# Patient Record
Sex: MALE | Race: WHITE | NOT HISPANIC OR LATINO | Employment: FULL TIME | RURAL
[De-identification: names, ages, dates, MRNs, and addresses within clinical notes are randomized per-mention and may not be internally consistent; named-entity substitution may affect disease eponyms.]

---

## 2019-06-11 ENCOUNTER — OFFICE VISIT (OUTPATIENT)
Dept: FAMILY MEDICINE CLINIC | Facility: CLINIC | Age: 39
End: 2019-06-11

## 2019-06-11 VITALS
SYSTOLIC BLOOD PRESSURE: 136 MMHG | HEART RATE: 90 BPM | TEMPERATURE: 97.1 F | WEIGHT: 225 LBS | BODY MASS INDEX: 34.1 KG/M2 | HEIGHT: 68 IN | OXYGEN SATURATION: 98 % | RESPIRATION RATE: 20 BRPM | DIASTOLIC BLOOD PRESSURE: 89 MMHG

## 2019-06-11 DIAGNOSIS — R11.2 NAUSEA AND VOMITING, INTRACTABILITY OF VOMITING NOT SPECIFIED, UNSPECIFIED VOMITING TYPE: ICD-10-CM

## 2019-06-11 DIAGNOSIS — K21.9 GASTROESOPHAGEAL REFLUX DISEASE, ESOPHAGITIS PRESENCE NOT SPECIFIED: ICD-10-CM

## 2019-06-11 DIAGNOSIS — K50.119 CROHN'S DISEASE OF COLON WITH COMPLICATION (HCC): Primary | ICD-10-CM

## 2019-06-11 PROCEDURE — 99214 OFFICE O/P EST MOD 30 MIN: CPT | Performed by: NURSE PRACTITIONER

## 2019-06-11 RX ORDER — TRAMADOL HYDROCHLORIDE 50 MG/1
50 TABLET ORAL AS NEEDED
COMMUNITY
End: 2020-12-11

## 2019-06-11 RX ORDER — ESOMEPRAZOLE MAGNESIUM 40 MG/1
40 CAPSULE, DELAYED RELEASE ORAL
COMMUNITY
End: 2019-06-11 | Stop reason: SDUPTHER

## 2019-06-11 RX ORDER — ESOMEPRAZOLE MAGNESIUM 40 MG/1
40 CAPSULE, DELAYED RELEASE ORAL
Qty: 30 CAPSULE | Refills: 5 | Status: SHIPPED | OUTPATIENT
Start: 2019-06-11 | End: 2020-02-10

## 2019-06-11 RX ORDER — ONDANSETRON 8 MG/1
8 TABLET, ORALLY DISINTEGRATING ORAL EVERY 8 HOURS PRN
Qty: 30 TABLET | Refills: 5 | Status: SHIPPED | OUTPATIENT
Start: 2019-06-11 | End: 2020-09-03

## 2019-06-11 RX ORDER — HYDROCODONE BITARTRATE AND ACETAMINOPHEN 10; 325 MG/1; MG/1
1 TABLET ORAL EVERY 6 HOURS PRN
COMMUNITY
End: 2020-09-03 | Stop reason: ALTCHOICE

## 2019-06-11 RX ORDER — GABAPENTIN 300 MG/1
300 CAPSULE ORAL 3 TIMES DAILY
COMMUNITY
End: 2023-01-27

## 2019-06-11 RX ORDER — CHOLECALCIFEROL (VITAMIN D3) 125 MCG
5 CAPSULE ORAL AS NEEDED
COMMUNITY

## 2019-06-11 RX ORDER — ONDANSETRON 8 MG/1
8 TABLET, ORALLY DISINTEGRATING ORAL EVERY 8 HOURS PRN
COMMUNITY
End: 2019-06-11 | Stop reason: SDUPTHER

## 2019-06-11 RX ORDER — TIZANIDINE 4 MG/1
4 TABLET ORAL 2 TIMES DAILY
COMMUNITY
End: 2022-04-05

## 2019-06-13 NOTE — PROGRESS NOTES
Subjective   Michel Harmon is a 38 y.o. male.     Here today needing referral to pain management.  He was being seen in [facility].  They closed and did not refer him to [place].  He has a hx of crohns and has chronic pain.  He is out of his humira and it does work when he is able to get it.      Abdominal Pain   This is a chronic problem. The current episode started more than 1 year ago. The onset quality is undetermined. The problem occurs constantly. The problem has been unchanged. The pain is located in the generalized abdominal region (due to crohns). The pain is at a severity of 3/10. The pain is mild. The quality of the pain is colicky, cramping, burning, aching, dull and a sensation of fullness. The abdominal pain does not radiate. Associated symptoms include anorexia, diarrhea and flatus. Pertinent negatives include no arthralgias, belching, constipation, dysuria, fever, frequency, headaches, hematochezia, hematuria, melena, myalgias, nausea, vomiting or weight loss. The pain is aggravated by eating. Relieved by: humeria. Treatments tried: see above. The treatment provided significant relief. Prior diagnostic workup includes barium enema, CT scan, GI consult and lower endoscopy. His past medical history is significant for Crohn's disease. There is no history of colon cancer, gallstones, GERD, irritable bowel syndrome, pancreatitis, PUD or ulcerative colitis.        The following portions of the patient's history were reviewed and updated as appropriate: allergies, current medications, past family history, past medical history, past social history, past surgical history and problem list.    Review of Systems   Constitutional: Negative.  Negative for fever and unexpected weight loss.   HENT: Negative.    Eyes: Negative.    Respiratory: Negative.    Cardiovascular: Negative.    Gastrointestinal: Positive for abdominal pain, anorexia, diarrhea and flatus. Negative for constipation,  hematochezia, melena, nausea and vomiting.   Endocrine: Negative.    Genitourinary: Negative.  Negative for dysuria, frequency and hematuria.   Musculoskeletal: Negative.  Negative for arthralgias and myalgias.   Skin: Negative.    Allergic/Immunologic: Negative.    Neurological: Negative.    Hematological: Negative.    Psychiatric/Behavioral: Negative.        Objective   Physical Exam   Constitutional: He is oriented to person, place, and time. He appears well-developed and well-nourished. No distress.   HENT:   Head: Normocephalic and atraumatic.   Right Ear: External ear normal.   Left Ear: External ear normal.   Nose: Nose normal.   Mouth/Throat: Oropharynx is clear and moist. No oropharyngeal exudate.   Eyes: Pupils are equal, round, and reactive to light.   Neck: Normal range of motion. Neck supple. No thyromegaly present.   Cardiovascular: Normal rate, regular rhythm and normal heart sounds. Exam reveals no friction rub.   No murmur heard.  Pulmonary/Chest: Effort normal and breath sounds normal. No respiratory distress. He has no wheezes. He has no rales.   Abdominal: Soft. Bowel sounds are normal. He exhibits no distension and no mass. There is tenderness. There is no rebound and no guarding.   Musculoskeletal: Normal range of motion.   Neurological: He is alert and oriented to person, place, and time.   Skin: Skin is warm and dry.   Psychiatric: He has a normal mood and affect. Thought content normal.   Nursing note and vitals reviewed.        Assessment/Plan   Michel was seen today for crohn's disease and discuss pain management.    Diagnoses and all orders for this visit:    Crohn's disease of colon with complication (CMS/[facility])  -     adalimumab (HUMIRA) 40 MG/0.8ML Prefilled Syringe Kit injection; Inject 0.8 mL under the skin into the appropriate area as directed 1 (One) Time Per Week.    Nausea and vomiting, intractability of vomiting not specified, unspecified vomiting type  -     ondansetron ODT  (ZOFRAN-ODT) 8 MG disintegrating tablet; Take 1 tablet by mouth Every 8 (Eight) Hours As Needed for Nausea or Vomiting.    Gastroesophageal reflux disease, esophagitis presence not specified  -     esomeprazole (nexIUM) 40 MG capsule; Take 1 capsule by mouth Every Morning Before Breakfast.

## 2019-08-16 ENCOUNTER — TELEPHONE (OUTPATIENT)
Dept: FAMILY MEDICINE CLINIC | Facility: CLINIC | Age: 39
End: 2019-08-16

## 2019-08-16 NOTE — TELEPHONE ENCOUNTER
[facility] records to [facility] finally got all records from [facility] [facility]

## 2019-08-21 ENCOUNTER — TELEPHONE (OUTPATIENT)
Dept: FAMILY MEDICINE CLINIC | Facility: CLINIC | Age: 39
End: 2019-08-21

## 2019-08-21 DIAGNOSIS — K50.019 CROHN'S DISEASE OF SMALL INTESTINE WITH COMPLICATION (HCC): Primary | ICD-10-CM

## 2019-08-21 PROBLEM — K85.90 PANCREATITIS: Status: ACTIVE | Noted: 2019-08-21

## 2019-08-21 PROBLEM — Z79.60 LONG-TERM USE OF IMMUNOSUPPRESSANT MEDICATION: Status: ACTIVE | Noted: 2018-03-20

## 2019-08-21 PROBLEM — G47.33 OBSTRUCTIVE SLEEP APNEA SYNDROME: Status: ACTIVE | Noted: 2019-08-21

## 2019-08-21 PROBLEM — K63.1: Status: ACTIVE | Noted: 2019-08-21

## 2019-08-21 PROBLEM — F17.200 TOBACCO DEPENDENCE: Status: ACTIVE | Noted: 2018-03-20

## 2019-08-21 PROBLEM — K44.9 HIATAL HERNIA: Status: ACTIVE | Noted: 2019-08-21

## 2020-02-09 DIAGNOSIS — K21.9 GASTROESOPHAGEAL REFLUX DISEASE, ESOPHAGITIS PRESENCE NOT SPECIFIED: ICD-10-CM

## 2020-02-10 RX ORDER — ESOMEPRAZOLE MAGNESIUM 40 MG/1
CAPSULE, DELAYED RELEASE ORAL
Qty: 30 CAPSULE | Refills: 4 | Status: SHIPPED | OUTPATIENT
Start: 2020-02-10 | End: 2020-07-22 | Stop reason: SDUPTHER

## 2020-07-22 DIAGNOSIS — K21.9 GASTROESOPHAGEAL REFLUX DISEASE, ESOPHAGITIS PRESENCE NOT SPECIFIED: ICD-10-CM

## 2020-07-22 RX ORDER — ESOMEPRAZOLE MAGNESIUM 40 MG/1
40 CAPSULE, DELAYED RELEASE ORAL DAILY
Qty: 30 CAPSULE | Refills: 0 | Status: SHIPPED | OUTPATIENT
Start: 2020-07-22 | End: 2020-08-31 | Stop reason: SDUPTHER

## 2020-08-06 DIAGNOSIS — K21.9 GASTROESOPHAGEAL REFLUX DISEASE, ESOPHAGITIS PRESENCE NOT SPECIFIED: ICD-10-CM

## 2020-08-06 RX ORDER — ESOMEPRAZOLE MAGNESIUM 40 MG/1
CAPSULE, DELAYED RELEASE ORAL
Qty: 30 CAPSULE | Refills: 0 | OUTPATIENT
Start: 2020-08-06

## 2020-08-25 DIAGNOSIS — K21.9 GASTROESOPHAGEAL REFLUX DISEASE, ESOPHAGITIS PRESENCE NOT SPECIFIED: ICD-10-CM

## 2020-08-25 RX ORDER — ESOMEPRAZOLE MAGNESIUM 40 MG/1
CAPSULE, DELAYED RELEASE ORAL
Qty: 30 CAPSULE | Refills: 0 | OUTPATIENT
Start: 2020-08-25

## 2020-08-27 DIAGNOSIS — K21.9 GASTROESOPHAGEAL REFLUX DISEASE, ESOPHAGITIS PRESENCE NOT SPECIFIED: ICD-10-CM

## 2020-08-27 RX ORDER — ESOMEPRAZOLE MAGNESIUM 40 MG/1
CAPSULE, DELAYED RELEASE ORAL
Qty: 30 CAPSULE | Refills: 0 | OUTPATIENT
Start: 2020-08-27

## 2020-08-31 DIAGNOSIS — K21.9 GASTROESOPHAGEAL REFLUX DISEASE, ESOPHAGITIS PRESENCE NOT SPECIFIED: ICD-10-CM

## 2020-08-31 RX ORDER — ESOMEPRAZOLE MAGNESIUM 40 MG/1
40 CAPSULE, DELAYED RELEASE ORAL DAILY
Qty: 30 CAPSULE | Refills: 0 | Status: SHIPPED | OUTPATIENT
Start: 2020-08-31 | End: 2020-09-17 | Stop reason: SDUPTHER

## 2020-08-31 NOTE — TELEPHONE ENCOUNTER
Patient needs refill         esomeprazole (nexIUM) 40 MG capsule  [id number] - [place], [place] - [address] AT [facility] [address] - [phone]  - [phone]    Been out for a week

## 2020-09-03 ENCOUNTER — OFFICE VISIT (OUTPATIENT)
Dept: FAMILY MEDICINE CLINIC | Facility: CLINIC | Age: 40
End: 2020-09-03

## 2020-09-03 VITALS
OXYGEN SATURATION: 97 % | BODY MASS INDEX: 33.25 KG/M2 | TEMPERATURE: 97.8 F | HEIGHT: 68 IN | DIASTOLIC BLOOD PRESSURE: 92 MMHG | SYSTOLIC BLOOD PRESSURE: 134 MMHG | WEIGHT: 219.38 LBS | HEART RATE: 79 BPM | RESPIRATION RATE: 20 BRPM

## 2020-09-03 DIAGNOSIS — K50.019 CROHN'S DISEASE OF SMALL INTESTINE WITH COMPLICATION (HCC): ICD-10-CM

## 2020-09-03 DIAGNOSIS — IMO0002 CHRONIC MIGRAINE: Primary | ICD-10-CM

## 2020-09-03 DIAGNOSIS — E66.9 OBESITY (BMI 30-39.9): ICD-10-CM

## 2020-09-03 PROCEDURE — 99214 OFFICE O/P EST MOD 30 MIN: CPT | Performed by: NURSE PRACTITIONER

## 2020-09-03 RX ORDER — RIZATRIPTAN BENZOATE 5 MG/1
5 TABLET ORAL ONCE AS NEEDED
Qty: 12 TABLET | Refills: 0 | Status: SHIPPED | OUTPATIENT
Start: 2020-09-03 | End: 2020-10-08 | Stop reason: SDUPTHER

## 2020-09-03 RX ORDER — OXYCODONE AND ACETAMINOPHEN 10; 325 MG/1; MG/1
1 TABLET ORAL 3 TIMES DAILY
COMMUNITY
Start: 2020-08-06

## 2020-09-03 RX ORDER — TAMSULOSIN HYDROCHLORIDE 0.4 MG/1
CAPSULE ORAL
COMMUNITY
Start: 2020-08-20 | End: 2020-09-03

## 2020-09-03 NOTE — PROGRESS NOTES
"Subjective   Michel Harmon is a 39 y.o. male.     [facility] Same Day Appointment    PCP: SAMMY Burnette--last seen June 2019    CC: \"med reill\"    Requesting Nexium refill for GERD, but PCP actually just sent in to pharmacy for him and he has picked that up.  Also requesting to be restarted on Maxalt for migraines.  Has had in the past with good results, but not taken in the past 3-4 years.  Reports he is taking BC powder daily for headaches.  Keeps a mild headache daily, but full migraine with n/v, photo/phonosensitivity at least 2 days per week.  Is in pain management for chronic nerve pain on right side following surgery for crohns.  Was previously seeing gastroenterology at [place], but reports he is unable to continue to make the drive for appointments and would like to see local Gastro--last seen early 2019.  Referral placed for Gastro.     Migraine    This is a chronic problem. The current episode started more than 1 year ago. Episode frequency: headaches daily, severe at least 2 days/week. The problem has been unchanged. Pain location: usually on right side. The pain does not radiate. The pain quality is similar to prior headaches (usually alwasy similar). The quality of the pain is described as throbbing. The patient is experiencing no pain (no headache curently). Associated symptoms include abdominal pain (chronic due to crohns), anorexia, nausea, phonophobia and photophobia. Pertinent negatives include no abnormal behavior, dizziness, drainage, ear pain, eye pain, eye redness, eye watering, facial sweating, fever, hearing loss, loss of balance, scalp tenderness, seizures, sinus pressure, tinnitus, visual change, vomiting, weakness or weight loss. The symptoms are aggravated by unknown. Treatments tried: currently on BC poweder daily with mild improvement; previously on Maxalt which helped. His past medical history is significant for migraine headaches. There is no history of recent head traumas. (Nerve damage " following surgery for crohns)   Abdominal Pain   This is a chronic problem. The current episode started more than 1 year ago. The onset quality is gradual. The problem occurs daily. The problem has been unchanged. The pain is located in the generalized abdominal region. The quality of the pain is dull, a sensation of fullness, cramping and colicky. Associated symptoms include anorexia, diarrhea, flatus, headaches and nausea. Pertinent negatives include no arthralgias, belching, constipation, dysuria, fever, frequency, hematochezia, hematuria, melena, myalgias, vomiting or weight loss. Nothing aggravates the pain. Relieved by: pain management sees for chronic pain; humira; nexium. The treatment provided moderate relief. Prior diagnostic workup includes GI consult (needs like GI consult). His past medical history is significant for Crohn's disease ( diagnosed around age 32). nerve damage following surgery for crohns        The following portions of the patient's history were reviewed and updated as appropriate: allergies, current medications, past medical history, past social history, past surgical history and problem list.    Review of Systems   Constitutional: Negative for chills, fatigue, fever and unexpected weight loss.   HENT: Negative for congestion, ear pain, hearing loss, sinus pressure and tinnitus.    Eyes: Positive for photophobia. Negative for pain and redness.   Respiratory: Negative.    Cardiovascular: Negative.    Gastrointestinal: Positive for abdominal pain (chronic due to crohns), anorexia, diarrhea, flatus and nausea. Negative for constipation, hematochezia, melena and vomiting.   Genitourinary: Negative for dysuria, frequency and hematuria.   Musculoskeletal: Negative for arthralgias and myalgias.   Skin: Negative.    Neurological: Positive for headache. Negative for dizziness, seizures, weakness and loss of balance.     /92 (BP Location: Right arm, Patient Position: Sitting)   Pulse 79    "Temp 97.8 °F (36.6 °C) (Temporal)   Resp 20   Ht 172.7 cm (68\")   Wt 99.5 kg (219 lb 6 oz)   SpO2 97%   BMI 33.36 kg/m²     Objective   Physical Exam   Constitutional: He is oriented to person, place, and time. He appears well-developed and well-nourished. No distress.   HENT:   Head: Normocephalic and atraumatic.   Right Ear: Tympanic membrane and ear canal normal.   Left Ear: Tympanic membrane and ear canal normal.   Nose: Nose normal. Right sinus exhibits no maxillary sinus tenderness and no frontal sinus tenderness. Left sinus exhibits no maxillary sinus tenderness and no frontal sinus tenderness.   Mouth/Throat: Uvula is midline, oropharynx is clear and moist and mucous membranes are normal.   Eyes: Conjunctivae and EOM are normal. Right eye exhibits no discharge. Left eye exhibits no discharge.   Neck: Normal range of motion. Neck supple.   Cardiovascular: Normal rate and regular rhythm.   Pulmonary/Chest: Effort normal and breath sounds normal. He has no wheezes. He has no rales.   Abdominal: Soft. Bowel sounds are normal. There is no tenderness. There is no rebound and no guarding.   Lymphadenopathy:     He has no cervical adenopathy.   Neurological: He is alert and oriented to person, place, and time.   Skin: Skin is warm and dry.   Nursing note and vitals reviewed.    No results found for this or any previous visit (from the past 24 hour(s)).  No Images in the past 120 days found..      Assessment/Plan   Michel was seen today for med refill.    Diagnoses and all orders for this visit:    Chronic migraine  -     rizatriptan (Maxalt) 5 MG tablet; Take 1 tablet by mouth 1 (One) Time As Needed for Migraine for up to 1 dose. May repeat in 2 hours if needed    Crohn's disease of small intestine with complication (CMS/HCC)  -     Ambulatory Referral to Gastroenterology    Obesity (BMI 30-39.9)      Rx for Maxalt for migraines  Keep headache log.  May need to start preventative migraine meds if Maxalt needed " more than once weekly    Referral to Gastro for ongoing management of Crohns disease    See PCP in 1 month for annual physical/labs/migraine recheck    Continue with pain management as scheduled    Patient's Body mass index is 33.36 kg/m². BMI is above normal parameters. Recommendations include: referral to primary care.     See PCP or RTC if symptoms persist/worsen  See PCP for routine f/u visit and management of chronic medical conditions      This document has been electronically signed by SAMMY Carney on September 3, 2020 09:03,.

## 2020-09-17 ENCOUNTER — LAB (OUTPATIENT)
Dept: LAB | Facility: HOSPITAL | Age: 40
End: 2020-09-17

## 2020-09-17 ENCOUNTER — OFFICE VISIT (OUTPATIENT)
Dept: GASTROENTEROLOGY | Facility: CLINIC | Age: 40
End: 2020-09-17

## 2020-09-17 VITALS
DIASTOLIC BLOOD PRESSURE: 103 MMHG | BODY MASS INDEX: 33.07 KG/M2 | SYSTOLIC BLOOD PRESSURE: 145 MMHG | WEIGHT: 218.2 LBS | HEART RATE: 98 BPM | HEIGHT: 68 IN

## 2020-09-17 DIAGNOSIS — K21.00 GASTROESOPHAGEAL REFLUX DISEASE WITH ESOPHAGITIS: ICD-10-CM

## 2020-09-17 DIAGNOSIS — R19.7 DIARRHEA, UNSPECIFIED TYPE: ICD-10-CM

## 2020-09-17 DIAGNOSIS — K21.9 GASTROESOPHAGEAL REFLUX DISEASE, ESOPHAGITIS PRESENCE NOT SPECIFIED: ICD-10-CM

## 2020-09-17 DIAGNOSIS — R11.2 NAUSEA AND VOMITING, INTRACTABILITY OF VOMITING NOT SPECIFIED, UNSPECIFIED VOMITING TYPE: Primary | ICD-10-CM

## 2020-09-17 DIAGNOSIS — K50.019 CROHN'S DISEASE OF SMALL INTESTINE WITH COMPLICATION (HCC): ICD-10-CM

## 2020-09-17 PROCEDURE — 99214 OFFICE O/P EST MOD 30 MIN: CPT | Performed by: PHYSICIAN ASSISTANT

## 2020-09-17 PROCEDURE — 36415 COLL VENOUS BLD VENIPUNCTURE: CPT | Performed by: PHYSICIAN ASSISTANT

## 2020-09-17 PROCEDURE — 85652 RBC SED RATE AUTOMATED: CPT | Performed by: PHYSICIAN ASSISTANT

## 2020-09-17 PROCEDURE — 80053 COMPREHEN METABOLIC PANEL: CPT | Performed by: PHYSICIAN ASSISTANT

## 2020-09-17 PROCEDURE — 86140 C-REACTIVE PROTEIN: CPT | Performed by: PHYSICIAN ASSISTANT

## 2020-09-17 PROCEDURE — 85025 COMPLETE CBC W/AUTO DIFF WBC: CPT | Performed by: PHYSICIAN ASSISTANT

## 2020-09-17 RX ORDER — HYOSCYAMINE SULFATE EXTENDED-RELEASE 0.38 MG/1
0.38 TABLET ORAL NIGHTLY
Qty: 60 TABLET | Refills: 3 | Status: SHIPPED | OUTPATIENT
Start: 2020-09-17 | End: 2021-08-24 | Stop reason: SDDI

## 2020-09-17 RX ORDER — DEXTROSE AND SODIUM CHLORIDE 5; .45 G/100ML; G/100ML
30 INJECTION, SOLUTION INTRAVENOUS CONTINUOUS PRN
Status: CANCELLED | OUTPATIENT
Start: 2020-10-07

## 2020-09-17 RX ORDER — ESOMEPRAZOLE MAGNESIUM 40 MG/1
40 CAPSULE, DELAYED RELEASE ORAL DAILY
Qty: 30 CAPSULE | Refills: 3 | Status: SHIPPED | OUTPATIENT
Start: 2020-09-17 | End: 2020-10-08 | Stop reason: SDUPTHER

## 2020-09-17 RX ORDER — SODIUM, POTASSIUM,MAG SULFATES 17.5-3.13G
SOLUTION, RECONSTITUTED, ORAL ORAL
Qty: 2 BOTTLE | Refills: 0 | Status: SHIPPED | OUTPATIENT
Start: 2020-09-17 | End: 2020-10-07 | Stop reason: HOSPADM

## 2020-09-18 LAB
ALBUMIN SERPL-MCNC: 4.6 G/DL (ref 3.5–5.2)
ALBUMIN/GLOB SERPL: 1.8 G/DL
ALP SERPL-CCNC: 89 U/L (ref 39–117)
ALT SERPL W P-5'-P-CCNC: 50 U/L (ref 1–41)
ANION GAP SERPL CALCULATED.3IONS-SCNC: 10.4 MMOL/L (ref 5–15)
AST SERPL-CCNC: 21 U/L (ref 1–40)
BASOPHILS # BLD AUTO: 0.1 10*3/MM3 (ref 0–0.2)
BASOPHILS NFR BLD AUTO: 0.9 % (ref 0–1.5)
BILIRUB SERPL-MCNC: 0.3 MG/DL (ref 0–1.2)
BUN SERPL-MCNC: 17 MG/DL (ref 6–20)
BUN/CREAT SERPL: 13.9 (ref 7–25)
CALCIUM SPEC-SCNC: 9.2 MG/DL (ref 8.6–10.5)
CHLORIDE SERPL-SCNC: 107 MMOL/L (ref 98–107)
CO2 SERPL-SCNC: 24.6 MMOL/L (ref 22–29)
CREAT SERPL-MCNC: 1.22 MG/DL (ref 0.76–1.27)
CRP SERPL-MCNC: 0.27 MG/DL (ref 0–0.5)
DEPRECATED RDW RBC AUTO: 43.4 FL (ref 37–54)
EOSINOPHIL # BLD AUTO: 0.45 10*3/MM3 (ref 0–0.4)
EOSINOPHIL NFR BLD AUTO: 4.2 % (ref 0.3–6.2)
ERYTHROCYTE [DISTWIDTH] IN BLOOD BY AUTOMATED COUNT: 13.2 % (ref 12.3–15.4)
ERYTHROCYTE [SEDIMENTATION RATE] IN BLOOD: 3 MM/HR (ref 0–15)
GFR SERPL CREATININE-BSD FRML MDRD: 65 ML/MIN/1.73
GLOBULIN UR ELPH-MCNC: 2.6 GM/DL
GLUCOSE SERPL-MCNC: 93 MG/DL (ref 65–99)
HCT VFR BLD AUTO: 45 % (ref 37.5–51)
HGB BLD-MCNC: 16 G/DL (ref 13–17.7)
IMM GRANULOCYTES # BLD AUTO: 0.04 10*3/MM3 (ref 0–0.05)
IMM GRANULOCYTES NFR BLD AUTO: 0.4 % (ref 0–0.5)
LYMPHOCYTES # BLD AUTO: 3.7 10*3/MM3 (ref 0.7–3.1)
LYMPHOCYTES NFR BLD AUTO: 34.8 % (ref 19.6–45.3)
MCH RBC QN AUTO: 32.1 PG (ref 26.6–33)
MCHC RBC AUTO-ENTMCNC: 35.6 G/DL (ref 31.5–35.7)
MCV RBC AUTO: 90.4 FL (ref 79–97)
MONOCYTES # BLD AUTO: 0.76 10*3/MM3 (ref 0.1–0.9)
MONOCYTES NFR BLD AUTO: 7.1 % (ref 5–12)
NEUTROPHILS NFR BLD AUTO: 5.59 10*3/MM3 (ref 1.7–7)
NEUTROPHILS NFR BLD AUTO: 52.6 % (ref 42.7–76)
NRBC BLD AUTO-RTO: 0 /100 WBC (ref 0–0.2)
PLATELET # BLD AUTO: 271 10*3/MM3 (ref 140–450)
PMV BLD AUTO: 10.8 FL (ref 6–12)
POTASSIUM SERPL-SCNC: 3.9 MMOL/L (ref 3.5–5.2)
PROT SERPL-MCNC: 7.2 G/DL (ref 6–8.5)
RBC # BLD AUTO: 4.98 10*6/MM3 (ref 4.14–5.8)
SODIUM SERPL-SCNC: 142 MMOL/L (ref 136–145)
WBC # BLD AUTO: 10.64 10*3/MM3 (ref 3.4–10.8)

## 2020-09-28 ENCOUNTER — LAB (OUTPATIENT)
Dept: LAB | Facility: HOSPITAL | Age: 40
End: 2020-09-28

## 2020-09-28 PROCEDURE — 83993 ASSAY FOR CALPROTECTIN FECAL: CPT | Performed by: PHYSICIAN ASSISTANT

## 2020-10-04 ENCOUNTER — LAB (OUTPATIENT)
Dept: LAB | Facility: HOSPITAL | Age: 40
End: 2020-10-04
Attending: INTERNAL MEDICINE

## 2020-10-04 DIAGNOSIS — Z01.818 PREOP TESTING: Primary | ICD-10-CM

## 2020-10-04 LAB — SARS-COV-2 N GENE RESP QL NAA+PROBE: NOT DETECTED

## 2020-10-04 PROCEDURE — C9803 HOPD COVID-19 SPEC COLLECT: HCPCS

## 2020-10-04 PROCEDURE — 87635 SARS-COV-2 COVID-19 AMP PRB: CPT

## 2020-10-07 ENCOUNTER — ANESTHESIA EVENT (OUTPATIENT)
Dept: GASTROENTEROLOGY | Facility: HOSPITAL | Age: 40
End: 2020-10-07

## 2020-10-07 ENCOUNTER — ANESTHESIA (OUTPATIENT)
Dept: GASTROENTEROLOGY | Facility: HOSPITAL | Age: 40
End: 2020-10-07

## 2020-10-07 ENCOUNTER — HOSPITAL ENCOUNTER (OUTPATIENT)
Facility: HOSPITAL | Age: 40
Setting detail: HOSPITAL OUTPATIENT SURGERY
Discharge: HOME | End: 2020-10-07
Attending: INTERNAL MEDICINE | Admitting: INTERNAL MEDICINE

## 2020-10-07 VITALS
RESPIRATION RATE: 16 BRPM | DIASTOLIC BLOOD PRESSURE: 69 MMHG | SYSTOLIC BLOOD PRESSURE: 120 MMHG | HEIGHT: 68 IN | HEART RATE: 76 BPM | OXYGEN SATURATION: 96 % | TEMPERATURE: 98.1 F | WEIGHT: 211.42 LBS | BODY MASS INDEX: 32.04 KG/M2

## 2020-10-07 DIAGNOSIS — K50.019 CROHN'S DISEASE OF SMALL INTESTINE WITH COMPLICATION (HCC): ICD-10-CM

## 2020-10-07 DIAGNOSIS — K21.00 GASTROESOPHAGEAL REFLUX DISEASE WITH ESOPHAGITIS: ICD-10-CM

## 2020-10-07 DIAGNOSIS — R19.7 DIARRHEA, UNSPECIFIED TYPE: ICD-10-CM

## 2020-10-07 DIAGNOSIS — R11.2 NAUSEA AND VOMITING, INTRACTABILITY OF VOMITING NOT SPECIFIED, UNSPECIFIED VOMITING TYPE: ICD-10-CM

## 2020-10-07 LAB — CALPROTECTIN STL-MCNT: 78 UG/G (ref 0–120)

## 2020-10-07 PROCEDURE — 25010000002 PROPOFOL 10 MG/ML EMULSION: Performed by: NURSE ANESTHETIST, CERTIFIED REGISTERED

## 2020-10-07 PROCEDURE — 45380 COLONOSCOPY AND BIOPSY: CPT | Performed by: INTERNAL MEDICINE

## 2020-10-07 PROCEDURE — 43239 EGD BIOPSY SINGLE/MULTIPLE: CPT | Performed by: INTERNAL MEDICINE

## 2020-10-07 RX ORDER — PROPOFOL 10 MG/ML
VIAL (ML) INTRAVENOUS AS NEEDED
Status: DISCONTINUED | OUTPATIENT
Start: 2020-10-07 | End: 2020-10-07 | Stop reason: SURG

## 2020-10-07 RX ORDER — DEXTROSE AND SODIUM CHLORIDE 5; .45 G/100ML; G/100ML
30 INJECTION, SOLUTION INTRAVENOUS CONTINUOUS PRN
Status: DISCONTINUED | OUTPATIENT
Start: 2020-10-07 | End: 2020-10-07 | Stop reason: HOSPADM

## 2020-10-07 RX ORDER — LIDOCAINE HYDROCHLORIDE 20 MG/ML
INJECTION, SOLUTION INTRAVENOUS AS NEEDED
Status: DISCONTINUED | OUTPATIENT
Start: 2020-10-07 | End: 2020-10-07 | Stop reason: SURG

## 2020-10-07 RX ADMIN — PROPOFOL 20 MG: 10 INJECTION, EMULSION INTRAVENOUS at 14:28

## 2020-10-07 RX ADMIN — PROPOFOL 40 MG: 10 INJECTION, EMULSION INTRAVENOUS at 14:25

## 2020-10-07 RX ADMIN — PROPOFOL 30 MG: 10 INJECTION, EMULSION INTRAVENOUS at 14:29

## 2020-10-07 RX ADMIN — PROPOFOL 30 MG: 10 INJECTION, EMULSION INTRAVENOUS at 14:27

## 2020-10-07 RX ADMIN — DEXTROSE AND SODIUM CHLORIDE 30 ML/HR: 5; 450 INJECTION, SOLUTION INTRAVENOUS at 14:15

## 2020-10-07 RX ADMIN — PROPOFOL 40 MG: 10 INJECTION, EMULSION INTRAVENOUS at 14:34

## 2020-10-07 RX ADMIN — LIDOCAINE HYDROCHLORIDE 100 MG: 20 INJECTION, SOLUTION INTRAVENOUS at 14:23

## 2020-10-07 RX ADMIN — PROPOFOL 30 MG: 10 INJECTION, EMULSION INTRAVENOUS at 14:30

## 2020-10-07 RX ADMIN — PROPOFOL 110 MG: 10 INJECTION, EMULSION INTRAVENOUS at 14:23

## 2020-10-07 NOTE — ANESTHESIA POSTPROCEDURE EVALUATION
Patient: Michel Harmon    Procedure Summary     Date: 10/07/20 Room / Location: [facility] ENDOSCOPY 1 / [facility] ENDOSCOPY    Anesthesia Start: 1419 Anesthesia Stop: 1437    Procedures:       ESOPHAGOGASTRODUODENOSCOPY--bx (N/A )      COLONOSCOPY--look TI (N/A ) Diagnosis:       Nausea and vomiting, intractability of vomiting not specified, unspecified vomiting type      Crohn's disease of small intestine with complication (CMS/HCC)      Gastroesophageal reflux disease with esophagitis      Diarrhea, unspecified type      (Nausea and vomiting, intractability of vomiting not specified, unspecified vomiting type [R11.2])      (Crohn's disease of small intestine with complication (CMS/HCC) [K50.019])      (Gastroesophageal reflux disease with esophagitis [K21.0])      (Diarrhea, unspecified type [R19.7])    Surgeon: Wisam Carpio MD Provider: Santana Oconnor CRNA    Anesthesia Type: MAC ASA Status: 2          Anesthesia Type: MAC    Vitals  No vitals data found for the desired time range.          Post Anesthesia Care and Evaluation    Patient location during evaluation: bedside  Patient participation: complete - patient participated  Level of consciousness: awake and alert  Pain score: 0  Pain management: adequate  Airway patency: patent  Anesthetic complications: No anesthetic complications  PONV Status: none  Cardiovascular status: acceptable  Respiratory status: acceptable and spontaneous ventilation  Hydration status: acceptable

## 2020-10-07 NOTE — ANESTHESIA PREPROCEDURE EVALUATION
Anesthesia Evaluation     NPO Solid Status: > 8 hours  NPO Liquid Status: > 2 hours           Airway   No difficulty expected  Dental      Pulmonary - normal exam   (+) sleep apnea,   Cardiovascular - normal exam        Neuro/Psych  (+) headaches,     GI/Hepatic/Renal/Endo    (+) obesity,  hiatal hernia, GERD,      Musculoskeletal     Abdominal    Substance History      OB/GYN          Other                        Anesthesia Plan    ASA 2     MAC     intravenous induction     Anesthetic plan, all risks, benefits, and alternatives have been provided, discussed and informed consent has been obtained with: patient.

## 2020-10-08 ENCOUNTER — OFFICE VISIT (OUTPATIENT)
Dept: FAMILY MEDICINE CLINIC | Facility: CLINIC | Age: 40
End: 2020-10-08

## 2020-10-08 VITALS
TEMPERATURE: 96.9 F | BODY MASS INDEX: 32.89 KG/M2 | OXYGEN SATURATION: 97 % | SYSTOLIC BLOOD PRESSURE: 112 MMHG | DIASTOLIC BLOOD PRESSURE: 80 MMHG | HEART RATE: 62 BPM | RESPIRATION RATE: 20 BRPM | WEIGHT: 217 LBS | HEIGHT: 68 IN

## 2020-10-08 DIAGNOSIS — IMO0002 CHRONIC MIGRAINE: ICD-10-CM

## 2020-10-08 DIAGNOSIS — K21.9 GASTROESOPHAGEAL REFLUX DISEASE: ICD-10-CM

## 2020-10-08 PROCEDURE — 99214 OFFICE O/P EST MOD 30 MIN: CPT | Performed by: NURSE PRACTITIONER

## 2020-10-08 RX ORDER — RIZATRIPTAN BENZOATE 5 MG/1
5 TABLET ORAL ONCE AS NEEDED
Qty: 18 TABLET | Refills: 0 | Status: SHIPPED | OUTPATIENT
Start: 2020-10-08 | End: 2020-12-10

## 2020-10-08 RX ORDER — ESOMEPRAZOLE MAGNESIUM 40 MG/1
40 CAPSULE, DELAYED RELEASE ORAL DAILY
Qty: 30 CAPSULE | Refills: 3 | Status: SHIPPED | OUTPATIENT
Start: 2020-10-08 | End: 2021-02-03 | Stop reason: SDUPTHER

## 2020-10-08 NOTE — PATIENT INSTRUCTIONS
Calorie Counting for Weight Loss  Calories are units of energy. Your body needs a certain amount of calories from food to keep you going throughout the day. When you eat more calories than your body needs, your body stores the extra calories as fat. When you eat fewer calories than your body needs, your body burns fat to get the energy it needs.  Calorie counting means keeping track of how many calories you eat and drink each day. Calorie counting can be helpful if you need to lose weight. If you make sure to eat fewer calories than your body needs, you should lose weight. Ask your health care provider what a healthy weight is for you.  For calorie counting to work, you will need to eat the right number of calories in a day in order to lose a healthy amount of weight per week. A dietitian can help you determine how many calories you need in a day and will give you suggestions on how to reach your calorie goal.  · A healthy amount of weight to lose per week is usually 1-2 lb (0.5-0.9 kg). This usually means that your daily calorie intake should be reduced by 500-750 calories.  · Eating 1,200 - 1,500 calories per day can help most women lose weight.  · Eating 1,500 - 1,800 calories per day can help most men lose weight.  What is my plan?  My goal is to have __________ calories per day.  If I have this many calories per day, I should lose around __________ pounds per week.  What do I need to know about calorie counting?  In order to meet your daily calorie goal, you will need to:  · Find out how many calories are in each food you would like to eat. Try to do this before you eat.  · Decide how much of the food you plan to eat.  · Write down what you ate and how many calories it had. Doing this is called keeping a food log.  To successfully lose weight, it is important to balance calorie counting with a healthy lifestyle that includes regular activity. Aim for 150 minutes of moderate exercise (such as walking) or 75  minutes of vigorous exercise (such as running) each week.  Where do I find calorie information?    The number of calories in a food can be found on a Nutrition Facts label. If a food does not have a Nutrition Facts label, try to look up the calories online or ask your dietitian for help.  Remember that calories are listed per serving. If you choose to have more than one serving of a food, you will have to multiply the calories per serving by the amount of servings you plan to eat. For example, the label on a package of bread might say that a serving size is 1 slice and that there are 90 calories in a serving. If you eat 1 slice, you will have eaten 90 calories. If you eat 2 slices, you will have eaten 180 calories.  How do I keep a food log?  Immediately after each meal, record the following information in your food log:  · What you ate. Don't forget to include toppings, sauces, and other extras on the food.  · How much you ate. This can be measured in cups, ounces, or number of items.  · How many calories each food and drink had.  · The total number of calories in the meal.  Keep your food log near you, such as in a small notebook in your pocket, or use a mobile guy or website. Some programs will calculate calories for you and show you how many calories you have left for the day to meet your goal.  What are some calorie counting tips?    · Use your calories on foods and drinks that will fill you up and not leave you hungry:  ? Some examples of foods that fill you up are nuts and nut butters, vegetables, lean proteins, and high-fiber foods like whole grains. High-fiber foods are foods with more than 5 g fiber per serving.  ? Drinks such as sodas, specialty coffee drinks, alcohol, and juices have a lot of calories, yet do not fill you up.  · Eat nutritious foods and avoid empty calories. Empty calories are calories you get from foods or beverages that do not have many vitamins or protein, such as candy, sweets, and  "soda. It is better to have a nutritious high-calorie food (such as an avocado) than a food with few nutrients (such as a bag of chips).  · Know how many calories are in the foods you eat most often. This will help you calculate calorie counts faster.  · Pay attention to calories in drinks. Low-calorie drinks include water and unsweetened drinks.  · Pay attention to nutrition labels for \"low fat\" or \"fat free\" foods. These foods sometimes have the same amount of calories or more calories than the full fat versions. They also often have added sugar, starch, or salt, to make up for flavor that was removed with the fat.  · Find a way of tracking calories that works for you. Get creative. Try different apps or programs if writing down calories does not work for you.  What are some portion control tips?  · Know how many calories are in a serving. This will help you know how many servings of a certain food you can have.  · Use a measuring cup to measure serving sizes. You could also try weighing out portions on a kitchen scale. With time, you will be able to estimate serving sizes for some foods.  · Take some time to put servings of different foods on your favorite plates, bowls, and cups so you know what a serving looks like.  · Try not to eat straight from a bag or box. Doing this can lead to overeating. Put the amount you would like to eat in a cup or on a plate to make sure you are eating the right portion.  · Use smaller plates, glasses, and bowls to prevent overeating.  · Try not to multitask (for example, watch TV or use your computer) while eating. If it is time to eat, sit down at a table and enjoy your food. This will help you to know when you are full. It will also help you to be aware of what you are eating and how much you are eating.  What are tips for following this plan?  Reading food labels  · Check the calorie count compared to the serving size. The serving size may be smaller than what you are used to " "eating.  · Check the source of the calories. Make sure the food you are eating is high in vitamins and protein and low in saturated and trans fats.  Shopping  · Read nutrition labels while you shop. This will help you make healthy decisions before you decide to purchase your food.  · Make a grocery list and stick to it.  Cooking  · Try to cook your favorite foods in a healthier way. For example, try baking instead of frying.  · Use low-fat dairy products.  Meal planning  · Use more fruits and vegetables. Half of your plate should be fruits and vegetables.  · Include lean proteins like poultry and fish.  How do I count calories when eating out?  · Ask for smaller portion sizes.  · Consider sharing an entree and sides instead of getting your own entree.  · If you get your own entree, eat only half. Ask for a box at the beginning of your meal and put the rest of your entree in it so you are not tempted to eat it.  · If calories are listed on the menu, choose the lower calorie options.  · Choose dishes that include vegetables, fruits, whole grains, low-fat dairy products, and lean protein.  · Choose items that are boiled, broiled, grilled, or steamed. Stay away from items that are buttered, battered, fried, or served with cream sauce. Items labeled \"crispy\" are usually fried, unless stated otherwise.  · Choose water, low-fat milk, unsweetened iced tea, or other drinks without added sugar. If you want an alcoholic beverage, choose a lower calorie option such as a glass of wine or light beer.  · Ask for dressings, sauces, and syrups on the side. These are usually high in calories, so you should limit the amount you eat.  · If you want a salad, choose a garden salad and ask for grilled meats. Avoid extra toppings like palomino, cheese, or fried items. Ask for the dressing on the side, or ask for olive oil and vinegar or lemon to use as dressing.  · Estimate how many servings of a food you are given. For example, a serving of " cooked rice is ½ cup or about the size of half a baseball. Knowing serving sizes will help you be aware of how much food you are eating at restaurants. The list below tells you how big or small some common portion sizes are based on everyday objects:  ? 1 oz--4 stacked dice.  ? 3 oz--1 deck of cards.  ? 1 tsp--1 die.  ? 1 Tbsp--½ a ping-pong ball.  ? 2 Tbsp--1 ping-pong ball.  ? ½ cup--½ baseball.  ? 1 cup--1 baseball.  Summary  · Calorie counting means keeping track of how many calories you eat and drink each day. If you eat fewer calories than your body needs, you should lose weight.  · A healthy amount of weight to lose per week is usually 1-2 lb (0.5-0.9 kg). This usually means reducing your daily calorie intake by 500-750 calories.  · The number of calories in a food can be found on a Nutrition Facts label. If a food does not have a Nutrition Facts label, try to look up the calories online or ask your dietitian for help.  · Use your calories on foods and drinks that will fill you up, and not on foods and drinks that will leave you hungry.  · Use smaller plates, glasses, and bowls to prevent overeating.  This information is not intended to replace advice given to you by your health care provider. Make sure you discuss any questions you have with your health care provider.  Document Released: 12/18/2006 Document Revised: 09/06/2019 Document Reviewed: 11/17/2017  [employer] Patient Education © 2020 [employer]      Exercising to Lose Weight  Exercise is structured, repetitive physical activity to improve fitness and health. Getting regular exercise is important for everyone. It is especially important if you are overweight. Being overweight increases your risk of heart disease, stroke, diabetes, high blood pressure, and several types of cancer. Reducing your calorie intake and exercising can help you lose weight.  Exercise is usually categorized as moderate or vigorous intensity. To lose weight, most people need  to do a certain amount of moderate-intensity or vigorous-intensity exercise each week.  Moderate-intensity exercise    Moderate-intensity exercise is any activity that gets you moving enough to burn at least three times more energy (calories) than if you were sitting.  Examples of moderate exercise include:  · Walking a mile in 15 minutes.  · Doing light yard work.  · Biking at an easy pace.  Most people should get at least 150 minutes (2 hours and 30 minutes) a week of moderate-intensity exercise to maintain their body weight.  Vigorous-intensity exercise  Vigorous-intensity exercise is any activity that gets you moving enough to burn at least six times more calories than if you were sitting. When you exercise at this intensity, you should be working hard enough that you are not able to carry on a conversation.  Examples of vigorous exercise include:  · Running.  · Playing a team sport, such as football, basketball, and soccer.  · Jumping rope.  Most people should get at least 75 minutes (1 hour and 15 minutes) a week of vigorous-intensity exercise to maintain their body weight.  How can exercise affect me?  When you exercise enough to burn more calories than you eat, you lose weight. Exercise also reduces body fat and builds muscle. The more muscle you have, the more calories you burn. Exercise also:  · Improves mood.  · Reduces stress and tension.  · Improves your overall fitness, flexibility, and endurance.  · Increases bone strength.  The amount of exercise you need to lose weight depends on:  · Your age.  · The type of exercise.  · Any health conditions you have.  · Your overall physical ability.  Talk to your health care provider about how much exercise you need and what types of activities are safe for you.  What actions can I take to lose weight?  Nutrition    · Make changes to your diet as told by your health care provider or diet and nutrition specialist (dietitian). This may include:  ? Eating fewer  calories.  ? Eating more protein.  ? Eating less unhealthy fats.  ? Eating a diet that includes fresh fruits and vegetables, whole grains, low-fat dairy products, and lean protein.  ? Avoiding foods with added fat, salt, and sugar.  · Drink plenty of water while you exercise to prevent dehydration or heat stroke.  Activity  · Choose an activity that you enjoy and set realistic goals. Your health care provider can help you make an exercise plan that works for you.  · Exercise at a moderate or vigorous intensity most days of the week.  ? The intensity of exercise may vary from person to person. You can tell how intense a workout is for you by paying attention to your breathing and heartbeat. Most people will notice their breathing and heartbeat get faster with more intense exercise.  · Do resistance training twice each week, such as:  ? Push-ups.  ? Sit-ups.  ? Lifting weights.  ? Using resistance bands.  · Getting short amounts of exercise can be just as helpful as long structured periods of exercise. If you have trouble finding time to exercise, try to include exercise in your daily routine.  ? Get up, stretch, and walk around every 30 minutes throughout the day.  ? Go for a walk during your lunch break.  ? Park your car farther away from your destination.  ? If you take public transportation, get off one stop early and walk the rest of the way.  ? Make phone calls while standing up and walking around.  ? Take the stairs instead of elevators or escalators.  · Wear comfortable clothes and shoes with good support.  · Do not exercise so much that you hurt yourself, feel dizzy, or get very short of breath.  Where to find more information  · [place] Department of Health and Human Services: www.hhs.gov  · Centers for Disease Control and Prevention (CDC): www.cdc.gov  Contact a health care provider:  · Before starting a new exercise program.  · If you have questions or concerns about your weight.  · If you have a medical  problem that keeps you from exercising.  Get help right away if you have any of the following while exercising:  · Injury.  · Dizziness.  · Difficulty breathing or shortness of breath that does not go away when you stop exercising.  · Chest pain.  · Rapid heartbeat.  Summary  · Being overweight increases your risk of heart disease, stroke, diabetes, high blood pressure, and several types of cancer.  · Losing weight happens when you burn more calories than you eat.  · Reducing the amount of calories you eat in addition to getting regular moderate or vigorous exercise each week helps you lose weight.  This information is not intended to replace advice given to you by your health care provider. Make sure you discuss any questions you have with your health care provider.  Document Released: 01/20/2012 Document Revised: 12/31/2018 Document Reviewed: 12/31/2018  [employer] Patient Education © 2020 [employer]

## 2020-10-08 NOTE — PROGRESS NOTES
Subjective   Michel Harmon is a 39 y.o. male.     Michel Harmon age 39 comes in today for refills he needs a refill on his Maxalt that he takes for his migraine headaches.  He states he has a migraine quality headache at least 3-4 times a week and he is not taking a preventative.  He does take oxycodone and gabapentin for chronic back pain and he also has a history of Crohn's disease.  He also has heartburn and takes Nexium.    Headache   This is a chronic problem. The problem occurs constantly. The problem has been unchanged. The pain is located in the bilateral and temporal region. The pain does not radiate. The pain quality is similar to prior headaches. The quality of the pain is described as aching, band-like and throbbing. The pain is at a severity of 10/10. The pain is severe. Associated symptoms include phonophobia, photophobia and rhinorrhea. Pertinent negatives include no abdominal pain, abnormal behavior, anorexia, back pain, blurred vision, coughing, dizziness, drainage, ear pain, eye pain, eye redness, eye watering, facial sweating, fever, hearing loss, insomnia, loss of balance, muscle aches, nausea, neck pain, numbness, scalp tenderness, seizures, sinus pressure, sore throat, swollen glands, tingling, tinnitus, visual change, vomiting, weakness or weight loss. The symptoms are aggravated by bright light, fatigue and weather changes. He has tried triptans for the symptoms. The treatment provided moderate relief. His past medical history is significant for migraine headaches.   Heartburn  He complains of belching and heartburn. He reports no abdominal pain, no chest pain, no choking, no coughing, no dysphagia, no early satiety, no globus sensation, no hoarse voice, no nausea, no sore throat, no stridor, no tooth decay, no water brash or no wheezing. This is a chronic problem. The current episode started more than 1 year ago. The problem occurs constantly. The problem has been unchanged. The  heartburn is located in the substernum. The heartburn is of moderate intensity. The heartburn does not wake him from sleep. The heartburn does not limit his activity. The heartburn doesn't change with position. The symptoms are aggravated by certain foods. Pertinent negatives include no anemia, fatigue, melena, muscle weakness, orthopnea or weight loss. There are no known risk factors. He has tried a PPI for the symptoms. The treatment provided significant relief.        The following portions of the patient's history were reviewed and updated as appropriate: allergies, current medications, past family history, past medical history, past social history, past surgical history and problem list.    Review of Systems   Constitutional: Negative.  Negative for fatigue, fever and unexpected weight loss.   HENT: Positive for rhinorrhea. Negative for ear pain, hearing loss, hoarse voice, sinus pressure, sore throat, swollen glands and tinnitus.    Eyes: Positive for photophobia. Negative for blurred vision, pain and redness.   Respiratory: Negative.  Negative for cough, choking and wheezing.    Cardiovascular: Negative.  Negative for chest pain.   Gastrointestinal: Negative for abdominal pain, anorexia, dysphagia, melena, nausea and vomiting.   Endocrine: Negative.    Genitourinary: Negative.    Musculoskeletal: Negative.  Negative for back pain, muscle weakness and neck pain.   Skin: Negative.    Allergic/Immunologic: Negative.    Neurological: Positive for headache. Negative for dizziness, tingling, seizures, weakness, numbness and loss of balance.   Hematological: Negative.    Psychiatric/Behavioral: Negative.  The patient does not have insomnia.        Objective   Physical Exam  Vitals signs and nursing note reviewed.   Constitutional:       General: He is not in acute distress.     Appearance: He is well-developed.   HENT:      Head: Normocephalic and atraumatic.      Right Ear: External ear normal.      Left Ear:  External ear normal.      Nose: Nose normal.      Mouth/Throat:      Pharynx: No oropharyngeal exudate.   Eyes:      Pupils: Pupils are equal, round, and reactive to light.   Neck:      Musculoskeletal: Normal range of motion and neck supple.      Thyroid: No thyromegaly.   Cardiovascular:      Rate and Rhythm: Normal rate and regular rhythm.      Heart sounds: Normal heart sounds. No murmur. No friction rub.   Pulmonary:      Effort: Pulmonary effort is normal. No respiratory distress.      Breath sounds: Normal breath sounds. No wheezing or rales.   Abdominal:      Palpations: Abdomen is soft.   Musculoskeletal: Normal range of motion.   Skin:     General: Skin is warm and dry.   Neurological:      Mental Status: He is alert and oriented to person, place, and time.   Psychiatric:         Thought Content: Thought content normal.           Assessment/Plan   Michel was seen today for annual exam.    Diagnoses and all orders for this visit:    Gastroesophageal reflux disease  -     esomeprazole (nexIUM) 40 MG capsule; Take 1 capsule by mouth Daily.    Chronic migraine  -     rizatriptan (Maxalt) 5 MG tablet; Take 1 tablet by mouth 1 (One) Time As Needed for Migraine for up to 1 dose. May repeat in 2 hours if needed      Did discuss with him the possibility of using a medication like Emgality for control of the migraine headaches to hopefully cut down the number of headaches he has a month.  He will think about it and get back with me.  He also sees his gastroenterologist next week.    My is noted to be 31 which is considered out of range.  Diet and exercise information will be provided this patient.

## 2020-10-12 LAB
LAB AP CASE REPORT: NORMAL
PATH REPORT.FINAL DX SPEC: NORMAL

## 2020-10-20 ENCOUNTER — OFFICE VISIT (OUTPATIENT)
Dept: GASTROENTEROLOGY | Facility: CLINIC | Age: 40
End: 2020-10-20

## 2020-10-20 VITALS
WEIGHT: 221.2 LBS | DIASTOLIC BLOOD PRESSURE: 93 MMHG | HEIGHT: 68 IN | SYSTOLIC BLOOD PRESSURE: 133 MMHG | HEART RATE: 86 BPM | BODY MASS INDEX: 33.52 KG/M2

## 2020-10-20 DIAGNOSIS — K50.019 CROHN'S DISEASE OF SMALL INTESTINE WITH COMPLICATION (HCC): Primary | ICD-10-CM

## 2020-10-20 DIAGNOSIS — K20.0 ESOPHAGITIS, EOSINOPHILIC: ICD-10-CM

## 2020-10-20 DIAGNOSIS — R19.7 DIARRHEA, UNSPECIFIED TYPE: ICD-10-CM

## 2020-10-20 DIAGNOSIS — R10.33 PERIUMBILICAL ABDOMINAL PAIN: ICD-10-CM

## 2020-10-20 PROCEDURE — 99214 OFFICE O/P EST MOD 30 MIN: CPT | Performed by: PHYSICIAN ASSISTANT

## 2020-10-20 RX ORDER — MESALAMINE 0.38 G/1
1125 CAPSULE, EXTENDED RELEASE ORAL DAILY
Qty: 120 CAPSULE | Refills: 3 | Status: SHIPPED | OUTPATIENT
Start: 2020-10-20 | End: 2020-11-11

## 2020-10-20 NOTE — PATIENT INSTRUCTIONS
"BMI for Adults  What is BMI?  Body mass index (BMI) is a number that is calculated from a person's weight and height. BMI can help estimate how much of a person's weight is composed of fat. BMI does not measure body fat directly. Rather, it is an alternative to procedures that directly measure body fat, which can be difficult and expensive.  BMI can help identify people who may be at higher risk for certain medical problems.  What are BMI measurements used for?  BMI is used as a screening tool to identify possible weight problems. It helps determine whether a person is obese, overweight, a healthy weight, or underweight.  BMI is useful for:  · Identifying a weight problem that may be related to a medical condition or may increase the risk for medical problems.  · Promoting changes, such as changes in diet and exercise, to help reach a healthy weight. BMI screening can be repeated to see if these changes are working.  How is BMI calculated?  BMI involves measuring your weight in relation to your height. Both height and weight are measured, and the BMI is calculated from those numbers. This can be done either in English ([place] or metric measurements. Note that charts and online BMI calculators are available to help you find your BMI quickly and easily without having to do these calculations yourself.  To calculate your BMI in English ([place] measurements:    1. Measure your weight in pounds (lb).  2. Multiply the number of pounds by 703.  ? For example, for a person who weighs 180 lb, multiply that number by 703, which equals 126,540.  3. Measure your height in inches. Then multiply that number by itself to get a measurement called \"inches squared.\"  ? For example, for a person who is 70 inches tall, the \"inches squared\" measurement is 70 inches x 70 inches, which equals 4,900 inches squared.  4. Divide the total from step 2 (number of lb x 703) by the total from step 3 (inches squared): 126,540 ÷ 4,900 = 25.8. This is " "your BMI.  To calculate your BMI in metric measurements:  1. Measure your weight in kilograms (kg).  2. Measure your height in meters (m). Then multiply that number by itself to get a measurement called \"meters squared.\"  ? For example, for a person who is 1.75 m tall, the \"meters squared\" measurement is 1.75 m x 1.75 m, which is equal to 3.1 meters squared.  3. Divide the number of kilograms (your weight) by the meters squared number. In this example: 70 ÷ 3.1 = 22.6. This is your BMI.  What do the results mean?  BMI charts are used to identify whether you are underweight, normal weight, overweight, or obese. The following guidelines will be used:  · Underweight: BMI less than 18.5.  · Normal weight: BMI between 18.5 and 24.9.  · Overweight: BMI between 25 and 29.9.  · Obese: BMI of 30 or above.  Keep these notes in mind:  · Weight includes both fat and muscle, so someone with a muscular build, such as an athlete, may have a BMI that is higher than 24.9. In cases like these, BMI is not an accurate measure of body fat.  · To determine if excess body fat is the cause of a BMI of 25 or higher, further assessments may need to be done by a health care provider.  · BMI is usually interpreted in the same way for men and women.  Where to find more information  For more information about BMI, including tools to quickly calculate your BMI, go to these websites:  · Centers for Disease Control and Prevention: www.cdc.gov  · American Heart Association: www.heart.org  · National Heart, Lung, and Blood [place]: www.nhlbi.nih.gov  Summary  · Body mass index (BMI) is a number that is calculated from a person's weight and height.  · BMI may help estimate how much of a person's weight is composed of fat. BMI can help identify those who may be at higher risk for certain medical problems.  · BMI can be measured using English measurements or metric measurements.  · BMI charts are used to identify whether you are underweight, normal " weight, overweight, or obese.  This information is not intended to replace advice given to you by your health care provider. Make sure you discuss any questions you have with your health care provider.  Document Released: 08/29/2005 Document Revised: 09/09/2020 Document Reviewed: 07/17/2020  Elsevier Patient Education © 2020 [employer]

## 2020-10-20 NOTE — PROGRESS NOTES
Chief Complaint   Patient presents with   • Nausea   • Vomiting   • Crohn's Disease   • Heartburn       ENDO PROCEDURE ORDERED:    Subjective    Michel Harmon is a 39 y.o. male. he is here today for follow-up.    History of Present Illness    Patient seen on a recheck of his nausea, vomiting, small bowel Crohn's disease, GERD. Last seen 09/17/2020. I had started him on Levbid at night. He states he is having more formed stools, they are less frequent. He is a little concerned he might get constipated on them. He has been vomiting some bile. He complains of 3 out of 10 abdominal pain. He is on Nexium 40 mg daily for chronic heartburn. No blood in his stool. Weight is up 3 pounds since last visit.     Laboratories 09/28/2020, Faecal calprotectin was borderline at 78. Normal sedimentation rate, CRP, CBC. CMP showed an ALT of 50, otherwise normal.     Patient underwent EGD/colonoscopy on 10/07/2020, showed esophagitis, diffuse gastritis. Distal esophageal biopsy showed increased eosinophils. Antral biopsy showed mild chronic gastritis, duodenal biopsy was negative. Colonoscopy showed prior end-to-side anastomosis in the ascending colon, prep was adequate. Biopsy from the terminal ileum showed chronic active ileitis. He did appear to have some ulcerations in this area. Random biopsies were negative. Recommend repeat colonoscopy in no greater than 5 years.       A/P: Patient with significant esophagitis, gastritis with increased eosinophils of questionable significance. He denied dysphagia. Will continue on the Nexium. He was encouraged to avoid gastric irritants. Encouraged dietary modification and weight loss. I discussed with him some other treatments for the bile he is refluxing. This may interfere with his other medications and I was concerned it may cause him to have more complications. Will continue on the Levbid for now. Will start him on Apriso 4 daily. His other results are reviewed with him. Will likely  need to plan further evaluation of his elevated liver enzymes if this remains persistently elevated. Will plan follow-up in 1 month, further pending clinical course and the results of the above.       The following portions of the patient's history were reviewed and updated as appropriate:   Past Medical History:   Diagnosis Date   • Crohn's disease (CMS/HCC)    • GERD (gastroesophageal reflux disease)    • Sleep apnea      Past Surgical History:   Procedure Laterality Date   • COLON SURGERY  2015   • COLONOSCOPY     • COLONOSCOPY N/A 10/7/2020    Procedure: COLONOSCOPY--look TI;  Surgeon: Wisam Carpio MD;  Location: [facility] ENDOSCOPY;  Service: Gastroenterology;  Laterality: N/A;   • ENDOSCOPY N/A 10/7/2020    Procedure: ESOPHAGOGASTRODUODENOSCOPY--bx;  Surgeon: Wisam Carpio MD;  Location: [facility] ENDOSCOPY;  Service: Gastroenterology;  Laterality: N/A;     Family History   Problem Relation Age of Onset   • Breast cancer Other    • Brain cancer Other    • Ulcers Other    • Bleeding Disorder Other        No Known Allergies  Social History     Socioeconomic History   • Marital status:      Spouse name: Not on file   • Number of children: Not on file   • Years of education: Not on file   • Highest education level: Not on file   Tobacco Use   • Smoking status: Current Every Day Smoker     Packs/day: 1.00     Types: Cigarettes   • Smokeless tobacco: Never Used   Substance and Sexual Activity   • Alcohol use: Not Currently   • Drug use: Not Currently   • Sexual activity: Defer     Current Medications:  Prior to Admission medications    Medication Sig Start Date End Date Taking? Authorizing Provider   Aspirin-Salicylamide-Caffeine (BC HEADACHE POWDER PO) Take  by mouth 2 (Two) Times a Day As Needed.   Yes Provider, MD Sharon   esomeprazole (nexIUM) 40 MG capsule Take 1 capsule by mouth Daily. 10/8/20  Yes Gogo Can APRN   gabapentin (NEURONTIN) 600 MG tablet Take 600 mg by mouth 4 (Four) Times a  "Day.   Yes Sharon Verduzco MD   hyoscyamine (Levbid) 0.375 MG 12 hr tablet Take 1 tablet by mouth Every Night. 9/17/20  Yes Dru Corrales PA-C   melatonin 5 MG tablet tablet Take 5 mg by mouth Every Night.   Yes Sharon Verduzco MD   oxyCODONE-acetaminophen (PERCOCET)  MG per tablet Take 1 tablet by mouth 3 (Three) Times a Day. 8/6/20  Yes Sharon Verduzco MD   rizatriptan (Maxalt) 5 MG tablet Take 1 tablet by mouth 1 (One) Time As Needed for Migraine for up to 1 dose. May repeat in 2 hours if needed 10/8/20  Yes Gogo Can APRN   tiZANidine (ZANAFLEX) 4 MG tablet Take 4 mg by mouth 2 (Two) Times a Day.   Yes Sharon Verduzco MD   traMADol (ULTRAM) 50 MG tablet Take 50 mg by mouth As Needed.   Yes Sharon Verduzco MD     Review of Systems  Review of Systems       Objective    /93   Pulse 86   Ht 172.7 cm (68\")   Wt 100 kg (221 lb 3.2 oz)   BMI 33.63 kg/m²   Physical Exam  Vitals signs and nursing note reviewed.   Constitutional:       General: He is not in acute distress.     Appearance: He is well-developed.   HENT:      Head: Normocephalic and atraumatic.   Eyes:      Pupils: Pupils are equal, round, and reactive to light.   Neck:      Musculoskeletal: Normal range of motion.   Cardiovascular:      Rate and Rhythm: Normal rate and regular rhythm.      Heart sounds: Normal heart sounds.   Pulmonary:      Effort: Pulmonary effort is normal.      Breath sounds: Normal breath sounds.   Abdominal:      General: Bowel sounds are normal. There is no distension or abdominal bruit.      Palpations: Abdomen is soft. Abdomen is not rigid. There is no shifting dullness or mass.      Tenderness: There is abdominal tenderness. There is no guarding or rebound.      Hernia: No hernia is present. There is no hernia in the ventral area.      Comments: mild   Musculoskeletal: Normal range of motion.   Skin:     General: Skin is warm and dry.   Neurological:      Mental Status: He " is alert and oriented to person, place, and time.   Psychiatric:         Behavior: Behavior normal.         Thought Content: Thought content normal.         Judgment: Judgment normal.       Assessment/Plan      1. Crohn's disease of small intestine with complication (CMS/HCC)    2. Diarrhea, unspecified type    3. Periumbilical abdominal pain    4. Esophagitis, eosinophilic    .   Diagnoses and all orders for this visit:    1. Crohn's disease of small intestine with complication (CMS/HCC) (Primary)    2. Diarrhea, unspecified type    3. Periumbilical abdominal pain    4. Esophagitis, eosinophilic    Other orders  -     mesalamine (Apriso) 0.375 g 24 hr capsule; Take 3 capsules by mouth Daily.  Dispense: 120 capsule; Refill: 3        Orders placed during this encounter include:  No orders of the defined types were placed in this encounter.      Medications prescribed:  New Medications Ordered This Visit   Medications   • mesalamine (Apriso) 0.375 g 24 hr capsule     Sig: Take 3 capsules by mouth Daily.     Dispense:  120 capsule     Refill:  3       Requested Prescriptions     Signed Prescriptions Disp Refills   • mesalamine (Apriso) 0.375 g 24 hr capsule 120 capsule 3     Sig: Take 3 capsules by mouth Daily.       Review and/or summary of lab tests, radiology, procedures, medications. Review and summary of old records and obtaining of history. The risks and benefits of my recommendations, as well as other treatment options were discussed with the patient today. Questions were answered.    Follow-up: Return in about 6 weeks (around 12/1/2020), or if symptoms worsen or fail to improve.     * Surgery not found *      This document has been electronically signed by Dru Corrales PA-C on October 21, 2020 19:01 CDT      Results for orders placed or performed during the hospital encounter of 10/07/20   Tissue Pathology Exam    Specimen: A: Small Intestine, Duodenum; Tissue    B: Gastric, Antrum; Tissue    C: Esophagus,  Distal; Tissue    D: Small Intestine, Ileum; Tissue    E: Large Intestine; Tissue   Result Value Ref Range    Case Report       Surgical Pathology Report                         Case: [id number]                                  Authorizing Provider:  Wisam Carpio MD        Collected:           10/07/2020 02:28 PM          Ordering Location:     [facility]             Received:            10/08/2020 07:19 AM                                 [facility]                                                     Pathologist:           Shorty Anderson MD                                                     Specimens:   1) - Small Intestine, Duodenum                                                                      2) - Gastric, Antrum                                                                                3) - Esophagus, Distal                                                                              4) - Small Intestine, Ileum, terminal ileum                                                         5) - Large Intestine, colonic mucosa                                                       Final Diagnosis       SEE SCANNED REPORT       Results for orders placed or performed in visit on 10/04/20   COVID-19, [facility] IN-HOUSE, NP SWAB IN TRANSPORT MEDIA 8-10 HR TAT - Swab, Nasopharynx    Specimen: Nasopharynx; Swab   Result Value Ref Range    COVID19 Not Detected Not Detected - Ref. Range   Results for orders placed or performed in visit on 09/17/20   Calprotectin, Fecal - Stool, Per Rectum    Specimen: Per Rectum; Stool   Result Value Ref Range    Calprotectin, Fecal 78 0 - 120 ug/g   CBC Auto Differential    Specimen: Blood   Result Value Ref Range    WBC 10.64 3.40 - 10.80 10*3/mm3    RBC 4.98 4.14 - 5.80 10*6/mm3    Hemoglobin 16.0 13.0 - 17.7 g/dL    Hematocrit 45.0 37.5 - 51.0 %    MCV 90.4 79.0 - 97.0 fL    MCH 32.1 26.6 - 33.0 pg    MCHC 35.6 31.5 - 35.7 g/dL    RDW 13.2 12.3 - 15.4 %     RDW-SD 43.4 37.0 - 54.0 fl    MPV 10.8 6.0 - 12.0 fL    Platelets 271 140 - 450 10*3/mm3    Neutrophil % 52.6 42.7 - 76.0 %    Lymphocyte % 34.8 19.6 - 45.3 %    Monocyte % 7.1 5.0 - 12.0 %    Eosinophil % 4.2 0.3 - 6.2 %    Basophil % 0.9 0.0 - 1.5 %    Immature Grans % 0.4 0.0 - 0.5 %    Neutrophils, Absolute 5.59 1.70 - 7.00 10*3/mm3    Lymphocytes, Absolute 3.70 (H) 0.70 - 3.10 10*3/mm3    Monocytes, Absolute 0.76 0.10 - 0.90 10*3/mm3    Eosinophils, Absolute 0.45 (H) 0.00 - 0.40 10*3/mm3    Basophils, Absolute 0.10 0.00 - 0.20 10*3/mm3    Immature Grans, Absolute 0.04 0.00 - 0.05 10*3/mm3    nRBC 0.0 0.0 - 0.2 /100 WBC   Sedimentation Rate    Specimen: Blood   Result Value Ref Range    Sed Rate 3 0 - 15 mm/hr   C-reactive Protein    Specimen: Blood   Result Value Ref Range    C-Reactive Protein 0.27 0.00 - 0.50 mg/dL   Comprehensive Metabolic Panel    Specimen: Blood   Result Value Ref Range    Glucose 93 65 - 99 mg/dL    BUN 17 6 - 20 mg/dL    Creatinine 1.22 0.76 - 1.27 mg/dL    Sodium 142 136 - 145 mmol/L    Potassium 3.9 3.5 - 5.2 mmol/L    Chloride 107 98 - 107 mmol/L    CO2 24.6 22.0 - 29.0 mmol/L    Calcium 9.2 8.6 - 10.5 mg/dL    Total Protein 7.2 6.0 - 8.5 g/dL    Albumin 4.60 3.50 - 5.20 g/dL    ALT (SGPT) 50 (H) 1 - 41 U/L    AST (SGOT) 21 1 - 40 U/L    Alkaline Phosphatase 89 39 - 117 U/L    Total Bilirubin 0.3 0.0 - 1.2 mg/dL    eGFR Non African Amer 65 >60 mL/min/1.73    Globulin 2.6 gm/dL    A/G Ratio 1.8 g/dL    BUN/Creatinine Ratio 13.9 7.0 - 25.0    Anion Gap 10.4 5.0 - 15.0 mmol/L       Some portions of this note have been dictated using voice recognition software and may contain errors and/or omissions.

## 2020-11-11 RX ORDER — MESALAMINE 800 MG/1
800 TABLET, DELAYED RELEASE ORAL 3 TIMES DAILY
Qty: 90 TABLET | Refills: 3 | Status: SHIPPED | OUTPATIENT
Start: 2020-11-11 | End: 2020-11-18

## 2020-11-18 RX ORDER — MESALAMINE 1.2 G/1
1200 TABLET, DELAYED RELEASE ORAL 4 TIMES DAILY
Qty: 120 TABLET | Refills: 3 | Status: SHIPPED | OUTPATIENT
Start: 2020-11-18 | End: 2021-04-20 | Stop reason: SDUPTHER

## 2020-12-01 ENCOUNTER — LAB (OUTPATIENT)
Dept: LAB | Facility: HOSPITAL | Age: 40
End: 2020-12-01

## 2020-12-01 ENCOUNTER — OFFICE VISIT (OUTPATIENT)
Dept: GASTROENTEROLOGY | Facility: CLINIC | Age: 40
End: 2020-12-01

## 2020-12-01 VITALS
BODY MASS INDEX: 33.49 KG/M2 | SYSTOLIC BLOOD PRESSURE: 137 MMHG | DIASTOLIC BLOOD PRESSURE: 88 MMHG | WEIGHT: 221 LBS | HEART RATE: 74 BPM | HEIGHT: 68 IN

## 2020-12-01 DIAGNOSIS — K50.019 CROHN'S DISEASE OF SMALL INTESTINE WITH COMPLICATION (HCC): Primary | ICD-10-CM

## 2020-12-01 DIAGNOSIS — R19.7 DIARRHEA, UNSPECIFIED TYPE: ICD-10-CM

## 2020-12-01 DIAGNOSIS — R11.2 NAUSEA AND VOMITING, INTRACTABILITY OF VOMITING NOT SPECIFIED, UNSPECIFIED VOMITING TYPE: ICD-10-CM

## 2020-12-01 DIAGNOSIS — R10.33 PERIUMBILICAL ABDOMINAL PAIN: ICD-10-CM

## 2020-12-01 LAB
ALBUMIN SERPL-MCNC: 4.6 G/DL (ref 3.5–5.2)
ALBUMIN/GLOB SERPL: 1.6 G/DL
ALP SERPL-CCNC: 94 U/L (ref 39–117)
ALT SERPL W P-5'-P-CCNC: 33 U/L (ref 1–41)
AMYLASE SERPL-CCNC: 87 U/L (ref 28–100)
ANION GAP SERPL CALCULATED.3IONS-SCNC: 12.4 MMOL/L (ref 5–15)
AST SERPL-CCNC: 21 U/L (ref 1–40)
BASOPHILS # BLD AUTO: 0.08 10*3/MM3 (ref 0–0.2)
BASOPHILS NFR BLD AUTO: 0.7 % (ref 0–1.5)
BILIRUB SERPL-MCNC: 0.3 MG/DL (ref 0–1.2)
BUN SERPL-MCNC: 15 MG/DL (ref 6–20)
BUN/CREAT SERPL: 12 (ref 7–25)
CALCIUM SPEC-SCNC: 9.3 MG/DL (ref 8.6–10.5)
CHLORIDE SERPL-SCNC: 108 MMOL/L (ref 98–107)
CO2 SERPL-SCNC: 23.6 MMOL/L (ref 22–29)
CREAT SERPL-MCNC: 1.25 MG/DL (ref 0.76–1.27)
CRP SERPL-MCNC: 0.44 MG/DL (ref 0–0.5)
DEPRECATED RDW RBC AUTO: 45.3 FL (ref 37–54)
EOSINOPHIL # BLD AUTO: 0.14 10*3/MM3 (ref 0–0.4)
EOSINOPHIL NFR BLD AUTO: 1.2 % (ref 0.3–6.2)
ERYTHROCYTE [DISTWIDTH] IN BLOOD BY AUTOMATED COUNT: 13.2 % (ref 12.3–15.4)
ERYTHROCYTE [SEDIMENTATION RATE] IN BLOOD: 4 MM/HR (ref 0–15)
GFR SERPL CREATININE-BSD FRML MDRD: 64 ML/MIN/1.73
GLOBULIN UR ELPH-MCNC: 2.8 GM/DL
GLUCOSE SERPL-MCNC: 119 MG/DL (ref 65–99)
HCT VFR BLD AUTO: 47.9 % (ref 37.5–51)
HGB BLD-MCNC: 16.7 G/DL (ref 13–17.7)
IMM GRANULOCYTES # BLD AUTO: 0.04 10*3/MM3 (ref 0–0.05)
IMM GRANULOCYTES NFR BLD AUTO: 0.3 % (ref 0–0.5)
LIPASE SERPL-CCNC: 48 U/L (ref 13–60)
LYMPHOCYTES # BLD AUTO: 1.73 10*3/MM3 (ref 0.7–3.1)
LYMPHOCYTES NFR BLD AUTO: 15 % (ref 19.6–45.3)
MCH RBC QN AUTO: 32.5 PG (ref 26.6–33)
MCHC RBC AUTO-ENTMCNC: 34.9 G/DL (ref 31.5–35.7)
MCV RBC AUTO: 93.2 FL (ref 79–97)
MONOCYTES # BLD AUTO: 0.48 10*3/MM3 (ref 0.1–0.9)
MONOCYTES NFR BLD AUTO: 4.2 % (ref 5–12)
NEUTROPHILS NFR BLD AUTO: 78.6 % (ref 42.7–76)
NEUTROPHILS NFR BLD AUTO: 9.07 10*3/MM3 (ref 1.7–7)
NRBC BLD AUTO-RTO: 0 /100 WBC (ref 0–0.2)
PLATELET # BLD AUTO: 266 10*3/MM3 (ref 140–450)
PMV BLD AUTO: 10.3 FL (ref 6–12)
POTASSIUM SERPL-SCNC: 4.6 MMOL/L (ref 3.5–5.2)
PROT SERPL-MCNC: 7.4 G/DL (ref 6–8.5)
RBC # BLD AUTO: 5.14 10*6/MM3 (ref 4.14–5.8)
SODIUM SERPL-SCNC: 144 MMOL/L (ref 136–145)
WBC # BLD AUTO: 11.54 10*3/MM3 (ref 3.4–10.8)

## 2020-12-01 PROCEDURE — 83690 ASSAY OF LIPASE: CPT | Performed by: PHYSICIAN ASSISTANT

## 2020-12-01 PROCEDURE — 80053 COMPREHEN METABOLIC PANEL: CPT | Performed by: PHYSICIAN ASSISTANT

## 2020-12-01 PROCEDURE — 86140 C-REACTIVE PROTEIN: CPT | Performed by: PHYSICIAN ASSISTANT

## 2020-12-01 PROCEDURE — 85651 RBC SED RATE NONAUTOMATED: CPT | Performed by: PHYSICIAN ASSISTANT

## 2020-12-01 PROCEDURE — 82150 ASSAY OF AMYLASE: CPT | Performed by: PHYSICIAN ASSISTANT

## 2020-12-01 PROCEDURE — 99214 OFFICE O/P EST MOD 30 MIN: CPT | Performed by: PHYSICIAN ASSISTANT

## 2020-12-01 PROCEDURE — 85025 COMPLETE CBC W/AUTO DIFF WBC: CPT | Performed by: PHYSICIAN ASSISTANT

## 2020-12-01 RX ORDER — ONDANSETRON HYDROCHLORIDE 8 MG/1
8 TABLET, FILM COATED ORAL EVERY 8 HOURS PRN
Qty: 30 TABLET | Refills: 1 | Status: SHIPPED | OUTPATIENT
Start: 2020-12-01 | End: 2022-06-23 | Stop reason: SDUPTHER

## 2020-12-01 RX ORDER — ONDANSETRON 8 MG/1
TABLET, ORALLY DISINTEGRATING ORAL
COMMUNITY
Start: 2020-11-02 | End: 2020-12-29 | Stop reason: SDUPTHER

## 2020-12-01 RX ORDER — METRONIDAZOLE 500 MG/1
500 TABLET ORAL 2 TIMES DAILY
Qty: 28 TABLET | Refills: 0 | Status: SHIPPED | OUTPATIENT
Start: 2020-12-01 | End: 2020-12-29

## 2020-12-01 NOTE — PROGRESS NOTES
Chief Complaint   Patient presents with   • Crohn's Disease   • Diarrhea   • esophagitis       ENDO PROCEDURE ORDERED:    Subjective    Michel Harmon is a 40 y.o. male. he is here today for follow-up.    History of Present Illness    The patient was seen on recheck of his Crohn's disease, diarrhea, GERD.  Last seen 10/20/2020.  Insurance required patient to take Lialda and he just started the medication this past Friday.  He started to have nausea and vomiting this morning.  He has had a headache and cold sweats.  Pain is 2/10 in the mid abdomen.  He has had increased diarrhea.  He does not think he has had blood in the stool, but he states he is color blind, but it is not certain.  He has been taking Levbid.  He is on Nexium 40 mg daily for chronic heartburn.  Weight is stable.  Last EGD/colonoscopy 10/07/2020 showed esophagitis, gastritis, prior anastomosis with active ileitis.    ASSESSMENT/PLAN:  Patient with nausea, vomiting, abdominal pain, increased diarrhea, with active ileitis.  We will continue on the Lialda.  We will give him a trial on Flagyl 500 mg b.i.d.  We will also add Zofran for his nausea and vomiting.  Would consider a trial of steroids, but we will try to avoid, if patient agrees and is reluctant to take steroids.  We will check a CBC, CMP, amylase, lipase, sedimentation rate, CRP.  Follow up in 1 month, sooner if needed.  Further pending clinical course and the results of the above.      The following portions of the patient's history were reviewed and updated as appropriate:   Past Medical History:   Diagnosis Date   • Crohn's disease (CMS/HCC)    • GERD (gastroesophageal reflux disease)    • Sleep apnea      Past Surgical History:   Procedure Laterality Date   • COLON SURGERY  2015   • COLONOSCOPY     • COLONOSCOPY N/A 10/7/2020    Procedure: COLONOSCOPY--look TI;  Surgeon: Wisam Carpio MD;  Location: [facility] ENDOSCOPY;  Service: Gastroenterology;  Laterality: N/A;   • ENDOSCOPY N/A  10/7/2020    Procedure: ESOPHAGOGASTRODUODENOSCOPY--bx;  Surgeon: Wisam Carpio MD;  Location: [facility] ENDOSCOPY;  Service: Gastroenterology;  Laterality: N/A;     Family History   Problem Relation Age of Onset   • Breast cancer Other    • Brain cancer Other    • Ulcers Other    • Bleeding Disorder Other        No Known Allergies  Social History     Socioeconomic History   • Marital status:      Spouse name: Not on file   • Number of children: Not on file   • Years of education: Not on file   • Highest education level: Not on file   Tobacco Use   • Smoking status: Current Every Day Smoker     Packs/day: 1.00     Types: Cigarettes   • Smokeless tobacco: Never Used   Substance and Sexual Activity   • Alcohol use: Not Currently   • Drug use: Not Currently   • Sexual activity: Defer     Current Medications:  Prior to Admission medications    Medication Sig Start Date End Date Taking? Authorizing Provider   Aspirin-Salicylamide-Caffeine (BC HEADACHE POWDER PO) Take  by mouth 2 (Two) Times a Day As Needed.   Yes ProviderSharon MD   esomeprazole (nexIUM) 40 MG capsule Take 1 capsule by mouth Daily. 10/8/20  Yes Gogo Can APRN   gabapentin (NEURONTIN) 600 MG tablet Take 600 mg by mouth 4 (Four) Times a Day.   Yes ProviderSharon MD   hyoscyamine (Levbid) 0.375 MG 12 hr tablet Take 1 tablet by mouth Every Night. 9/17/20  Yes Dru Corrales PA-C   melatonin 5 MG tablet tablet Take 5 mg by mouth Every Night.   Yes Sharon Verduzco MD   mesalamine (Lialda) 1.2 g EC tablet Take 1 tablet by mouth 4 (Four) Times a Day. 11/18/20  Yes Dru Corrales PA-C   oxyCODONE-acetaminophen (PERCOCET)  MG per tablet Take 1 tablet by mouth 3 (Three) Times a Day. 8/6/20  Yes Sharon Verduzco MD   rizatriptan (Maxalt) 5 MG tablet Take 1 tablet by mouth 1 (One) Time As Needed for Migraine for up to 1 dose. May repeat in 2 hours if needed 10/8/20  Yes Gogo Can APRN   tiZANidine  "(ZANAFLEX) 4 MG tablet Take 4 mg by mouth 2 (Two) Times a Day.   Yes Provider, MD Sharon   ondansetron ODT (ZOFRAN-ODT) 8 MG disintegrating tablet  11/2/20   Provider, MD Sharon   traMADol (ULTRAM) 50 MG tablet Take 50 mg by mouth As Needed.    Provider, MD Sharon     Review of Systems  Review of Systems       Objective    /88   Pulse 74   Ht 172.7 cm (68\")   Wt 100 kg (221 lb)   BMI 33.60 kg/m²   Physical Exam  Vitals signs and nursing note reviewed.   Constitutional:       General: He is not in acute distress.     Appearance: He is well-developed. He is ill-appearing.   HENT:      Head: Normocephalic and atraumatic.   Eyes:      Pupils: Pupils are equal, round, and reactive to light.   Neck:      Musculoskeletal: Normal range of motion.   Cardiovascular:      Rate and Rhythm: Normal rate and regular rhythm.      Heart sounds: Normal heart sounds.   Pulmonary:      Effort: Pulmonary effort is normal.      Breath sounds: Normal breath sounds.   Abdominal:      General: Bowel sounds are normal. There is no distension or abdominal bruit.      Palpations: Abdomen is soft. Abdomen is not rigid. There is no shifting dullness or mass.      Tenderness: There is abdominal tenderness. There is no guarding or rebound.      Hernia: No hernia is present. There is no hernia in the ventral area.      Comments: Mild diffuse   Musculoskeletal: Normal range of motion.   Skin:     General: Skin is warm and dry.   Neurological:      Mental Status: He is alert and oriented to person, place, and time.   Psychiatric:         Behavior: Behavior normal.         Thought Content: Thought content normal.         Judgment: Judgment normal.       Assessment/Plan      1. Crohn's disease of small intestine with complication (CMS/HCC)    2. Diarrhea, unspecified type    3. Nausea and vomiting, intractability of vomiting not specified, unspecified vomiting type    4. Periumbilical abdominal pain    .   Diagnoses and all " orders for this visit:    1. Crohn's disease of small intestine with complication (CMS/HCC) (Primary)  -     CBC & Differential  -     Comprehensive Metabolic Panel  -     Amylase  -     Lipase  -     C-reactive Protein  -     Sedimentation Rate  -     CBC Auto Differential    2. Diarrhea, unspecified type  -     CBC & Differential  -     Comprehensive Metabolic Panel  -     Amylase  -     Lipase  -     C-reactive Protein  -     Sedimentation Rate  -     CBC Auto Differential    3. Nausea and vomiting, intractability of vomiting not specified, unspecified vomiting type  -     CBC & Differential  -     Comprehensive Metabolic Panel  -     Amylase  -     Lipase  -     C-reactive Protein  -     Sedimentation Rate  -     CBC Auto Differential    4. Periumbilical abdominal pain  -     CBC & Differential  -     Comprehensive Metabolic Panel  -     Amylase  -     Lipase  -     C-reactive Protein  -     Sedimentation Rate  -     CBC Auto Differential    Other orders  -     metroNIDAZOLE (FLAGYL) 500 MG tablet; Take 1 tablet by mouth 2 (Two) Times a Day.  Dispense: 28 tablet; Refill: 0  -     ondansetron (Zofran) 8 MG tablet; Take 1 tablet by mouth Every 8 (Eight) Hours As Needed for Nausea or Vomiting.  Dispense: 30 tablet; Refill: 1        Orders placed during this encounter include:  Orders Placed This Encounter   Procedures   • Comprehensive Metabolic Panel   • Amylase   • Lipase   • C-reactive Protein   • Sedimentation Rate   • CBC Auto Differential   • CBC & Differential     Order Specific Question:   Manual Differential     Answer:   No       Medications prescribed:  New Medications Ordered This Visit   Medications   • metroNIDAZOLE (FLAGYL) 500 MG tablet     Sig: Take 1 tablet by mouth 2 (Two) Times a Day.     Dispense:  28 tablet     Refill:  0   • ondansetron (Zofran) 8 MG tablet     Sig: Take 1 tablet by mouth Every 8 (Eight) Hours As Needed for Nausea or Vomiting.     Dispense:  30 tablet     Refill:  1        Requested Prescriptions     Signed Prescriptions Disp Refills   • metroNIDAZOLE (FLAGYL) 500 MG tablet 28 tablet 0     Sig: Take 1 tablet by mouth 2 (Two) Times a Day.   • ondansetron (Zofran) 8 MG tablet 30 tablet 1     Sig: Take 1 tablet by mouth Every 8 (Eight) Hours As Needed for Nausea or Vomiting.       Review and/or summary of lab tests, radiology, procedures, medications. Review and summary of old records and obtaining of history. The risks and benefits of my recommendations, as well as other treatment options were discussed with the patient today. Questions were answered.    Follow-up: Return in about 1 month (around 1/1/2021), or if symptoms worsen or fail to improve.     * Surgery not found *      This document has been electronically signed by Dru Corrales PA-C on December 11, 2020 16:56 CST      Results for orders placed or performed in visit on 12/01/20   CBC Auto Differential    Specimen: Blood   Result Value Ref Range    WBC 11.54 (H) 3.40 - 10.80 10*3/mm3    RBC 5.14 4.14 - 5.80 10*6/mm3    Hemoglobin 16.7 13.0 - 17.7 g/dL    Hematocrit 47.9 37.5 - 51.0 %    MCV 93.2 79.0 - 97.0 fL    MCH 32.5 26.6 - 33.0 pg    MCHC 34.9 31.5 - 35.7 g/dL    RDW 13.2 12.3 - 15.4 %    RDW-SD 45.3 37.0 - 54.0 fl    MPV 10.3 6.0 - 12.0 fL    Platelets 266 140 - 450 10*3/mm3    Neutrophil % 78.6 (H) 42.7 - 76.0 %    Lymphocyte % 15.0 (L) 19.6 - 45.3 %    Monocyte % 4.2 (L) 5.0 - 12.0 %    Eosinophil % 1.2 0.3 - 6.2 %    Basophil % 0.7 0.0 - 1.5 %    Immature Grans % 0.3 0.0 - 0.5 %    Neutrophils, Absolute 9.07 (H) 1.70 - 7.00 10*3/mm3    Lymphocytes, Absolute 1.73 0.70 - 3.10 10*3/mm3    Monocytes, Absolute 0.48 0.10 - 0.90 10*3/mm3    Eosinophils, Absolute 0.14 0.00 - 0.40 10*3/mm3    Basophils, Absolute 0.08 0.00 - 0.20 10*3/mm3    Immature Grans, Absolute 0.04 0.00 - 0.05 10*3/mm3    nRBC 0.0 0.0 - 0.2 /100 WBC   Sedimentation Rate    Specimen: Blood   Result Value Ref Range    Sed Rate 4 0 - 15 mm/hr    C-reactive Protein    Specimen: Blood   Result Value Ref Range    C-Reactive Protein 0.44 0.00 - 0.50 mg/dL   Lipase    Specimen: Blood   Result Value Ref Range    Lipase 48 13 - 60 U/L   Amylase    Specimen: Blood   Result Value Ref Range    Amylase 87 28 - 100 U/L   Comprehensive Metabolic Panel    Specimen: Blood   Result Value Ref Range    Glucose 119 (H) 65 - 99 mg/dL    BUN 15 6 - 20 mg/dL    Creatinine 1.25 0.76 - 1.27 mg/dL    Sodium 144 136 - 145 mmol/L    Potassium 4.6 3.5 - 5.2 mmol/L    Chloride 108 (H) 98 - 107 mmol/L    CO2 23.6 22.0 - 29.0 mmol/L    Calcium 9.3 8.6 - 10.5 mg/dL    Total Protein 7.4 6.0 - 8.5 g/dL    Albumin 4.60 3.50 - 5.20 g/dL    ALT (SGPT) 33 1 - 41 U/L    AST (SGOT) 21 1 - 40 U/L    Alkaline Phosphatase 94 39 - 117 U/L    Total Bilirubin 0.3 0.0 - 1.2 mg/dL    eGFR Non African Amer 64 >60 mL/min/1.73    Globulin 2.8 gm/dL    A/G Ratio 1.6 g/dL    BUN/Creatinine Ratio 12.0 7.0 - 25.0    Anion Gap 12.4 5.0 - 15.0 mmol/L   Results for orders placed or performed during the hospital encounter of 10/07/20   Tissue Pathology Exam    Specimen: A: Small Intestine, Duodenum; Tissue    B: Gastric, Antrum; Tissue    C: Esophagus, Distal; Tissue    D: Small Intestine, Ileum; Tissue    E: Large Intestine; Tissue   Result Value Ref Range    Case Report       Surgical Pathology Report                         Case: [id number]                                  Authorizing Provider:  Wisam Carpio MD        Collected:           10/07/2020 02:28 PM          Ordering Location:     [facility]             Received:            10/08/2020 07:19 AM                                 [facility]                                                     Pathologist:           Shorty Anderson MD                                                     Specimens:   1) - Small Intestine, Duodenum                                                                      2) - Gastric, Antrum                                                                                 3) - Esophagus, Distal                                                                              4) - Small Intestine, Ileum, terminal ileum                                                         5) - Large Intestine, colonic mucosa                                                       Final Diagnosis       SEE SCANNED REPORT       Results for orders placed or performed in visit on 10/04/20   COVID-19, [facility] IN-HOUSE, NP SWAB IN TRANSPORT MEDIA 8-10 HR TAT - Swab, Nasopharynx    Specimen: Nasopharynx; Swab   Result Value Ref Range    COVID19 Not Detected Not Detected - Ref. Range   Results for orders placed or performed in visit on 09/17/20   Calprotectin, Fecal - Stool, Per Rectum    Specimen: Per Rectum; Stool   Result Value Ref Range    Calprotectin, Fecal 78 0 - 120 ug/g   CBC Auto Differential    Specimen: Blood   Result Value Ref Range    WBC 10.64 3.40 - 10.80 10*3/mm3    RBC 4.98 4.14 - 5.80 10*6/mm3    Hemoglobin 16.0 13.0 - 17.7 g/dL    Hematocrit 45.0 37.5 - 51.0 %    MCV 90.4 79.0 - 97.0 fL    MCH 32.1 26.6 - 33.0 pg    MCHC 35.6 31.5 - 35.7 g/dL    RDW 13.2 12.3 - 15.4 %    RDW-SD 43.4 37.0 - 54.0 fl    MPV 10.8 6.0 - 12.0 fL    Platelets 271 140 - 450 10*3/mm3    Neutrophil % 52.6 42.7 - 76.0 %    Lymphocyte % 34.8 19.6 - 45.3 %    Monocyte % 7.1 5.0 - 12.0 %    Eosinophil % 4.2 0.3 - 6.2 %    Basophil % 0.9 0.0 - 1.5 %    Immature Grans % 0.4 0.0 - 0.5 %    Neutrophils, Absolute 5.59 1.70 - 7.00 10*3/mm3    Lymphocytes, Absolute 3.70 (H) 0.70 - 3.10 10*3/mm3    Monocytes, Absolute 0.76 0.10 - 0.90 10*3/mm3    Eosinophils, Absolute 0.45 (H) 0.00 - 0.40 10*3/mm3    Basophils, Absolute 0.10 0.00 - 0.20 10*3/mm3    Immature Grans, Absolute 0.04 0.00 - 0.05 10*3/mm3    nRBC 0.0 0.0 - 0.2 /100 WBC   Sedimentation Rate    Specimen: Blood   Result Value Ref Range    Sed Rate 3 0 - 15 mm/hr   C-reactive Protein    Specimen: Blood   Result  Value Ref Range    C-Reactive Protein 0.27 0.00 - 0.50 mg/dL   Comprehensive Metabolic Panel    Specimen: Blood   Result Value Ref Range    Glucose 93 65 - 99 mg/dL    BUN 17 6 - 20 mg/dL    Creatinine 1.22 0.76 - 1.27 mg/dL    Sodium 142 136 - 145 mmol/L    Potassium 3.9 3.5 - 5.2 mmol/L    Chloride 107 98 - 107 mmol/L    CO2 24.6 22.0 - 29.0 mmol/L    Calcium 9.2 8.6 - 10.5 mg/dL    Total Protein 7.2 6.0 - 8.5 g/dL    Albumin 4.60 3.50 - 5.20 g/dL    ALT (SGPT) 50 (H) 1 - 41 U/L    AST (SGOT) 21 1 - 40 U/L    Alkaline Phosphatase 89 39 - 117 U/L    Total Bilirubin 0.3 0.0 - 1.2 mg/dL    eGFR Non African Amer 65 >60 mL/min/1.73    Globulin 2.6 gm/dL    A/G Ratio 1.8 g/dL    BUN/Creatinine Ratio 13.9 7.0 - 25.0    Anion Gap 10.4 5.0 - 15.0 mmol/L       Some portions of this note have been dictated using voice recognition software and may contain errors and/or omissions.

## 2020-12-01 NOTE — PATIENT INSTRUCTIONS
"BMI for Adults  What is BMI?  Body mass index (BMI) is a number that is calculated from a person's weight and height. BMI can help estimate how much of a person's weight is composed of fat. BMI does not measure body fat directly. Rather, it is an alternative to procedures that directly measure body fat, which can be difficult and expensive.  BMI can help identify people who may be at higher risk for certain medical problems.  What are BMI measurements used for?  BMI is used as a screening tool to identify possible weight problems. It helps determine whether a person is obese, overweight, a healthy weight, or underweight.  BMI is useful for:  · Identifying a weight problem that may be related to a medical condition or may increase the risk for medical problems.  · Promoting changes, such as changes in diet and exercise, to help reach a healthy weight. BMI screening can be repeated to see if these changes are working.  How is BMI calculated?  BMI involves measuring your weight in relation to your height. Both height and weight are measured, and the BMI is calculated from those numbers. This can be done either in English (U.S[place] or metric measurements. Note that charts and online BMI calculators are available to help you find your BMI quickly and easily without having to do these calculations yourself.  To calculate your BMI in English (U.S.) measurements:    1. Measure your weight in pounds (lb).  2. Multiply the number of pounds by 703.  ? For example, for a person who weighs 180 lb, multiply that number by 703, which equals 126,540.  3. Measure your height in inches. Then multiply that number by itself to get a measurement called \"inches squared.\"  ? For example, for a person who is 70 inches tall, the \"inches squared\" measurement is 70 inches x 70 inches, which equals 4,900 inches squared.  4. Divide the total from step 2 (number of lb x 703) by the total from step 3 (inches squared): 126,540 ÷ 4,900 = 25.8. This is " "your BMI.  To calculate your BMI in metric measurements:  1. Measure your weight in kilograms (kg).  2. Measure your height in meters (m). Then multiply that number by itself to get a measurement called \"meters squared.\"  ? For example, for a person who is 1.75 m tall, the \"meters squared\" measurement is 1.75 m x 1.75 m, which is equal to 3.1 meters squared.  3. Divide the number of kilograms (your weight) by the meters squared number. In this example: 70 ÷ 3.1 = 22.6. This is your BMI.  What do the results mean?  BMI charts are used to identify whether you are underweight, normal weight, overweight, or obese. The following guidelines will be used:  · Underweight: BMI less than 18.5.  · Normal weight: BMI between 18.5 and 24.9.  · Overweight: BMI between 25 and 29.9.  · Obese: BMI of 30 or above.  Keep these notes in mind:  · Weight includes both fat and muscle, so someone with a muscular build, such as an athlete, may have a BMI that is higher than 24.9. In cases like these, BMI is not an accurate measure of body fat.  · To determine if excess body fat is the cause of a BMI of 25 or higher, further assessments may need to be done by a health care provider.  · BMI is usually interpreted in the same way for men and women.  Where to find more information  For more information about BMI, including tools to quickly calculate your BMI, go to these websites:  · Centers for Disease Control and Prevention: www.cdc.gov  · American Heart Association: www.heart.org  · National Heart, Lung, and Blood Carbon: www.nhlbi.nih.gov  Summary  · Body mass index (BMI) is a number that is calculated from a person's weight and height.  · BMI may help estimate how much of a person's weight is composed of fat. BMI can help identify those who may be at higher risk for certain medical problems.  · BMI can be measured using English measurements or metric measurements.  · BMI charts are used to identify whether you are underweight, normal " weight, overweight, or obese.  This information is not intended to replace advice given to you by your health care provider. Make sure you discuss any questions you have with your health care provider.  Document Revised: 09/09/2020 Document Reviewed: 07/17/2020  Elsevier Patient Education © 2020 [employer]

## 2020-12-03 ENCOUNTER — TELEPHONE (OUTPATIENT)
Dept: GASTROENTEROLOGY | Facility: CLINIC | Age: 40
End: 2020-12-03

## 2020-12-03 NOTE — TELEPHONE ENCOUNTER
Patient wife called stating that the patient has severe abd pain, nausea and vomiting. Patients wife has been advised to have the patient come to the ED here at [facility] for an additional workup to include a CT scan. Understanding has been voiced.

## 2020-12-10 DIAGNOSIS — IMO0002 CHRONIC MIGRAINE: ICD-10-CM

## 2020-12-10 RX ORDER — RIZATRIPTAN BENZOATE 5 MG/1
TABLET ORAL
Qty: 6 TABLET | Refills: 5 | Status: SHIPPED | OUTPATIENT
Start: 2020-12-10 | End: 2022-06-23 | Stop reason: ALTCHOICE

## 2020-12-23 NOTE — PROGRESS NOTES
Patient had called the office complaining of severe abdominal pain on 12/03/2020. We did receive the records where he did go to the emergency room on that date. He did have a CT scan abdomen and pelvis with contrast at [facility]. This showed partially distended bladder, no inflammatory changes, postoperative changes in the right lower quadrant consistent with prior appendectomy, mild diffuse fatty liver, probable cyst less than 1 cm in the dome of the liver, small hiatal hernia, supraumbilical ventral hernia containing normal-appearing fat. The mouth of the hernia was 3 cm in transverse dimension. CBC was fairly normal with a white count of 9.7. A urinalysis was negative. It did show some mucus. It states that a CMP was obtained but it was not in the notes that we received. It also indicates he had a magnesium and a lipase drawn. He was discharged home.

## 2020-12-29 ENCOUNTER — OFFICE VISIT (OUTPATIENT)
Dept: GASTROENTEROLOGY | Facility: CLINIC | Age: 40
End: 2020-12-29

## 2020-12-29 VITALS
WEIGHT: 214 LBS | HEIGHT: 68 IN | DIASTOLIC BLOOD PRESSURE: 83 MMHG | BODY MASS INDEX: 32.43 KG/M2 | HEART RATE: 84 BPM | SYSTOLIC BLOOD PRESSURE: 127 MMHG

## 2020-12-29 DIAGNOSIS — K50.019 CROHN'S DISEASE OF SMALL INTESTINE WITH COMPLICATION (HCC): Primary | ICD-10-CM

## 2020-12-29 DIAGNOSIS — R19.7 DIARRHEA, UNSPECIFIED TYPE: ICD-10-CM

## 2020-12-29 DIAGNOSIS — R10.33 PERIUMBILICAL ABDOMINAL PAIN: ICD-10-CM

## 2020-12-29 DIAGNOSIS — K21.00 GASTROESOPHAGEAL REFLUX DISEASE WITH ESOPHAGITIS WITHOUT HEMORRHAGE: ICD-10-CM

## 2020-12-29 PROCEDURE — 99214 OFFICE O/P EST MOD 30 MIN: CPT | Performed by: PHYSICIAN ASSISTANT

## 2021-02-03 DIAGNOSIS — K21.9 GASTROESOPHAGEAL REFLUX DISEASE: ICD-10-CM

## 2021-02-04 RX ORDER — ESOMEPRAZOLE MAGNESIUM 40 MG/1
40 CAPSULE, DELAYED RELEASE ORAL DAILY
Qty: 30 CAPSULE | Refills: 3 | Status: SHIPPED | OUTPATIENT
Start: 2021-02-04 | End: 2021-07-09 | Stop reason: SDUPTHER

## 2021-03-16 ENCOUNTER — TELEPHONE (OUTPATIENT)
Dept: GASTROENTEROLOGY | Facility: CLINIC | Age: 41
End: 2021-03-16

## 2021-03-16 NOTE — TELEPHONE ENCOUNTER
Patient has been contacted regarding his [employer] message. Patient states he forgot to take his esomeprazole for a few days and had been suffering from severe reflux. He states he is better now. The patient is aware that if he has any further issues to call our office to make a sooner follow up.

## 2021-03-16 NOTE — TELEPHONE ENCOUNTER
----- Message from Dru Corrales PA-C sent at 3/15/2021  5:43 PM CDT -----  Regarding: FW: Visit Follow-Up Question  Contact: [phone]  He can go to ER or come into the office. I can't treat him electronically.  ----- Message -----  From: Catalina Solitario MA  Sent: 3/15/2021  11:33 AM CDT  To: Dru Corrales PA-C  Subject: FW: Visit Follow-Up Question                       ----- Message -----  From: Michel Harmon  Sent: 3/15/2021   2:24 AM CDT  To: [facility]  Subject: Visit Follow-Up Question                         I am writing this for my .  He has been throwing up all day long, clammy, alternating between hot and cold.  He is now complaining of right upper side abdominal pain, there is a lump in the area.  As he has had these symptoms before, I am concerned that he has pancreatitis again.  He called out for work Monday, and refuses to go to [facility] as every time he goes up there he has to wait for 10 hours to get a banana bag and get sent home.  They missed the hole in his intestines several years ago that had been poisoning him for days and was only caught when he was medically transported to [place], where he then received an emergency illiocetomy.  Please contact with recommended instructions.    Thank you,  Lina Harmon

## 2021-04-05 ENCOUNTER — TELEPHONE (OUTPATIENT)
Dept: FAMILY MEDICINE CLINIC | Facility: CLINIC | Age: 41
End: 2021-04-05

## 2021-04-20 ENCOUNTER — OFFICE VISIT (OUTPATIENT)
Dept: GASTROENTEROLOGY | Facility: CLINIC | Age: 41
End: 2021-04-20

## 2021-04-20 VITALS
WEIGHT: 222.4 LBS | DIASTOLIC BLOOD PRESSURE: 90 MMHG | HEART RATE: 96 BPM | HEIGHT: 68 IN | SYSTOLIC BLOOD PRESSURE: 147 MMHG | BODY MASS INDEX: 33.71 KG/M2

## 2021-04-20 DIAGNOSIS — K21.00 GASTROESOPHAGEAL REFLUX DISEASE WITH ESOPHAGITIS WITHOUT HEMORRHAGE: ICD-10-CM

## 2021-04-20 DIAGNOSIS — R19.7 DIARRHEA, UNSPECIFIED TYPE: ICD-10-CM

## 2021-04-20 DIAGNOSIS — K50.019 CROHN'S DISEASE OF SMALL INTESTINE WITH COMPLICATION (HCC): Primary | ICD-10-CM

## 2021-04-20 PROCEDURE — 99213 OFFICE O/P EST LOW 20 MIN: CPT | Performed by: PHYSICIAN ASSISTANT

## 2021-04-20 RX ORDER — MESALAMINE 1.2 G/1
1200 TABLET, DELAYED RELEASE ORAL 4 TIMES DAILY
Qty: 120 TABLET | Refills: 3 | Status: SHIPPED | OUTPATIENT
Start: 2021-04-20 | End: 2021-08-24

## 2021-07-09 DIAGNOSIS — K21.9 GASTROESOPHAGEAL REFLUX DISEASE: ICD-10-CM

## 2021-07-09 RX ORDER — ESOMEPRAZOLE MAGNESIUM 40 MG/1
40 CAPSULE, DELAYED RELEASE ORAL DAILY
Qty: 30 CAPSULE | Refills: 1 | Status: SHIPPED | OUTPATIENT
Start: 2021-07-09 | End: 2021-08-12

## 2021-08-12 DIAGNOSIS — K21.9 GASTROESOPHAGEAL REFLUX DISEASE: ICD-10-CM

## 2021-08-12 RX ORDER — ESOMEPRAZOLE MAGNESIUM 40 MG/1
CAPSULE, DELAYED RELEASE ORAL
Qty: 30 CAPSULE | Refills: 0 | Status: SHIPPED | OUTPATIENT
Start: 2021-08-12 | End: 2021-10-20 | Stop reason: SDUPTHER

## 2021-08-24 ENCOUNTER — OFFICE VISIT (OUTPATIENT)
Dept: GASTROENTEROLOGY | Facility: CLINIC | Age: 41
End: 2021-08-24

## 2021-08-24 ENCOUNTER — LAB (OUTPATIENT)
Dept: LAB | Facility: HOSPITAL | Age: 41
End: 2021-08-24

## 2021-08-24 VITALS
WEIGHT: 212.6 LBS | SYSTOLIC BLOOD PRESSURE: 146 MMHG | HEART RATE: 82 BPM | DIASTOLIC BLOOD PRESSURE: 92 MMHG | BODY MASS INDEX: 32.22 KG/M2 | HEIGHT: 68 IN

## 2021-08-24 DIAGNOSIS — R19.7 DIARRHEA, UNSPECIFIED TYPE: ICD-10-CM

## 2021-08-24 DIAGNOSIS — Z91.199 NONCOMPLIANCE: ICD-10-CM

## 2021-08-24 DIAGNOSIS — K50.019 CROHN'S DISEASE OF SMALL INTESTINE WITH COMPLICATION (HCC): ICD-10-CM

## 2021-08-24 DIAGNOSIS — K21.00 GASTROESOPHAGEAL REFLUX DISEASE WITH ESOPHAGITIS WITHOUT HEMORRHAGE: Primary | ICD-10-CM

## 2021-08-24 DIAGNOSIS — R63.4 WEIGHT LOSS, ABNORMAL: ICD-10-CM

## 2021-08-24 PROCEDURE — 99213 OFFICE O/P EST LOW 20 MIN: CPT | Performed by: PHYSICIAN ASSISTANT

## 2021-08-24 PROCEDURE — 85652 RBC SED RATE AUTOMATED: CPT | Performed by: PHYSICIAN ASSISTANT

## 2021-08-24 PROCEDURE — 85025 COMPLETE CBC W/AUTO DIFF WBC: CPT | Performed by: PHYSICIAN ASSISTANT

## 2021-08-24 PROCEDURE — 86140 C-REACTIVE PROTEIN: CPT | Performed by: PHYSICIAN ASSISTANT

## 2021-08-24 PROCEDURE — 80053 COMPREHEN METABOLIC PANEL: CPT | Performed by: PHYSICIAN ASSISTANT

## 2021-08-25 LAB
ALBUMIN SERPL-MCNC: 4.5 G/DL (ref 3.5–5.2)
ALBUMIN/GLOB SERPL: 1.7 G/DL
ALP SERPL-CCNC: 86 U/L (ref 39–117)
ALT SERPL W P-5'-P-CCNC: 29 U/L (ref 1–41)
ANION GAP SERPL CALCULATED.3IONS-SCNC: 14.3 MMOL/L (ref 5–15)
AST SERPL-CCNC: 22 U/L (ref 1–40)
BASOPHILS # BLD AUTO: 0.1 10*3/MM3 (ref 0–0.2)
BASOPHILS NFR BLD AUTO: 1 % (ref 0–1.5)
BILIRUB SERPL-MCNC: 0.6 MG/DL (ref 0–1.2)
BUN SERPL-MCNC: 14 MG/DL (ref 6–20)
BUN/CREAT SERPL: 10.1 (ref 7–25)
CALCIUM SPEC-SCNC: 9.3 MG/DL (ref 8.6–10.5)
CHLORIDE SERPL-SCNC: 107 MMOL/L (ref 98–107)
CO2 SERPL-SCNC: 22.7 MMOL/L (ref 22–29)
CREAT SERPL-MCNC: 1.38 MG/DL (ref 0.76–1.27)
CRP SERPL-MCNC: <0.3 MG/DL (ref 0–0.5)
DEPRECATED RDW RBC AUTO: 45.4 FL (ref 37–54)
EOSINOPHIL # BLD AUTO: 0.37 10*3/MM3 (ref 0–0.4)
EOSINOPHIL NFR BLD AUTO: 3.7 % (ref 0.3–6.2)
ERYTHROCYTE [DISTWIDTH] IN BLOOD BY AUTOMATED COUNT: 13.7 % (ref 12.3–15.4)
ERYTHROCYTE [SEDIMENTATION RATE] IN BLOOD: 5 MM/HR (ref 0–15)
GFR SERPL CREATININE-BSD FRML MDRD: 57 ML/MIN/1.73
GLOBULIN UR ELPH-MCNC: 2.7 GM/DL
GLUCOSE SERPL-MCNC: 79 MG/DL (ref 65–99)
HCT VFR BLD AUTO: 49.9 % (ref 37.5–51)
HGB BLD-MCNC: 17.7 G/DL (ref 13–17.7)
IMM GRANULOCYTES # BLD AUTO: 0.04 10*3/MM3 (ref 0–0.05)
IMM GRANULOCYTES NFR BLD AUTO: 0.4 % (ref 0–0.5)
LYMPHOCYTES # BLD AUTO: 2.65 10*3/MM3 (ref 0.7–3.1)
LYMPHOCYTES NFR BLD AUTO: 26.5 % (ref 19.6–45.3)
MCH RBC QN AUTO: 32.4 PG (ref 26.6–33)
MCHC RBC AUTO-ENTMCNC: 35.5 G/DL (ref 31.5–35.7)
MCV RBC AUTO: 91.4 FL (ref 79–97)
MONOCYTES # BLD AUTO: 0.78 10*3/MM3 (ref 0.1–0.9)
MONOCYTES NFR BLD AUTO: 7.8 % (ref 5–12)
NEUTROPHILS NFR BLD AUTO: 6.05 10*3/MM3 (ref 1.7–7)
NEUTROPHILS NFR BLD AUTO: 60.6 % (ref 42.7–76)
NRBC BLD AUTO-RTO: 0 /100 WBC (ref 0–0.2)
PLATELET # BLD AUTO: 293 10*3/MM3 (ref 140–450)
PMV BLD AUTO: 10.4 FL (ref 6–12)
POTASSIUM SERPL-SCNC: 4.9 MMOL/L (ref 3.5–5.2)
PROT SERPL-MCNC: 7.2 G/DL (ref 6–8.5)
RBC # BLD AUTO: 5.46 10*6/MM3 (ref 4.14–5.8)
SODIUM SERPL-SCNC: 144 MMOL/L (ref 136–145)
WBC # BLD AUTO: 9.99 10*3/MM3 (ref 3.4–10.8)

## 2021-08-31 ENCOUNTER — TELEPHONE (OUTPATIENT)
Dept: GASTROENTEROLOGY | Facility: CLINIC | Age: 41
End: 2021-08-31

## 2021-08-31 NOTE — TELEPHONE ENCOUNTER
A voicemail has been left for the patients wife to make her aware that the pharmacy did in fact have his rx for esomeprazole on file and that he can not fill that until 9-4-21. As far as the refill for Maxalt goes the patient will need to obtain that refill from his pcp.

## 2021-08-31 NOTE — TELEPHONE ENCOUNTER
----- Message from Edilma Levin LPN sent at 8/31/2021  1:21 PM CDT -----  08/31/2021, 1321 - Patient's wife, Richa Harmon, telephoned stating [facility] does not have the prescription renewal for Esomeprazole 40 MG, although prescription renewal reflects Dru renewed medication on 08/12/2021 with a receipt confirmation of 5:30 P.M. received from pharmacy.  Mrs. Harmon also requested Dru to renew patient's Maxalt 5 MG.  Mrs. Harmon may be contacted at [phone].

## 2021-10-20 DIAGNOSIS — R79.89 ELEVATED SERUM CREATININE: Primary | ICD-10-CM

## 2021-10-20 DIAGNOSIS — K21.9 GASTROESOPHAGEAL REFLUX DISEASE: ICD-10-CM

## 2021-10-21 RX ORDER — ESOMEPRAZOLE MAGNESIUM 40 MG/1
40 CAPSULE, DELAYED RELEASE ORAL DAILY
Qty: 30 CAPSULE | Refills: 1 | Status: SHIPPED | OUTPATIENT
Start: 2021-10-21 | End: 2021-11-15

## 2021-11-15 DIAGNOSIS — K21.9 GASTROESOPHAGEAL REFLUX DISEASE: ICD-10-CM

## 2021-11-15 RX ORDER — ESOMEPRAZOLE MAGNESIUM 40 MG/1
CAPSULE, DELAYED RELEASE ORAL
Qty: 30 CAPSULE | Refills: 0 | Status: SHIPPED | OUTPATIENT
Start: 2021-11-15 | End: 2022-01-18 | Stop reason: SDUPTHER

## 2021-11-30 ENCOUNTER — OFFICE VISIT (OUTPATIENT)
Dept: GASTROENTEROLOGY | Facility: CLINIC | Age: 41
End: 2021-11-30

## 2021-11-30 ENCOUNTER — LAB (OUTPATIENT)
Dept: LAB | Facility: HOSPITAL | Age: 41
End: 2021-11-30

## 2021-11-30 VITALS
SYSTOLIC BLOOD PRESSURE: 113 MMHG | WEIGHT: 210 LBS | HEIGHT: 68 IN | DIASTOLIC BLOOD PRESSURE: 81 MMHG | HEART RATE: 81 BPM | BODY MASS INDEX: 31.83 KG/M2

## 2021-11-30 DIAGNOSIS — K21.00 GASTROESOPHAGEAL REFLUX DISEASE WITH ESOPHAGITIS WITHOUT HEMORRHAGE: ICD-10-CM

## 2021-11-30 DIAGNOSIS — R10.33 PERIUMBILICAL ABDOMINAL PAIN: ICD-10-CM

## 2021-11-30 DIAGNOSIS — R79.89 ELEVATED SERUM CREATININE: ICD-10-CM

## 2021-11-30 DIAGNOSIS — K50.019 CROHN'S DISEASE OF SMALL INTESTINE WITH COMPLICATION (HCC): Primary | ICD-10-CM

## 2021-11-30 DIAGNOSIS — R19.7 DIARRHEA, UNSPECIFIED TYPE: ICD-10-CM

## 2021-11-30 DIAGNOSIS — N17.9 AKI (ACUTE KIDNEY INJURY) (HCC): ICD-10-CM

## 2021-11-30 DIAGNOSIS — N18.30 STAGE 3 CHRONIC KIDNEY DISEASE, UNSPECIFIED WHETHER STAGE 3A OR 3B CKD (HCC): ICD-10-CM

## 2021-11-30 PROCEDURE — 80048 BASIC METABOLIC PNL TOTAL CA: CPT | Performed by: PHYSICIAN ASSISTANT

## 2021-11-30 PROCEDURE — 99213 OFFICE O/P EST LOW 20 MIN: CPT | Performed by: PHYSICIAN ASSISTANT

## 2021-11-30 RX ORDER — HYOSCYAMINE SULFATE EXTENDED-RELEASE 0.38 MG/1
0.38 TABLET ORAL NIGHTLY
COMMUNITY
End: 2022-04-05

## 2021-11-30 RX ORDER — MESALAMINE 1.2 G/1
4800 TABLET, DELAYED RELEASE ORAL
COMMUNITY
End: 2022-11-16 | Stop reason: SDUPTHER

## 2021-12-01 LAB
ANION GAP SERPL CALCULATED.3IONS-SCNC: 9.6 MMOL/L (ref 5–15)
BUN SERPL-MCNC: 22 MG/DL (ref 6–20)
BUN/CREAT SERPL: 14.4 (ref 7–25)
CALCIUM SPEC-SCNC: 9.5 MG/DL (ref 8.6–10.5)
CHLORIDE SERPL-SCNC: 103 MMOL/L (ref 98–107)
CO2 SERPL-SCNC: 28.4 MMOL/L (ref 22–29)
CREAT SERPL-MCNC: 1.53 MG/DL (ref 0.76–1.27)
GFR SERPL CREATININE-BSD FRML MDRD: 50 ML/MIN/1.73
GLUCOSE SERPL-MCNC: 90 MG/DL (ref 65–99)
POTASSIUM SERPL-SCNC: 4.9 MMOL/L (ref 3.5–5.2)
SODIUM SERPL-SCNC: 141 MMOL/L (ref 136–145)

## 2022-01-18 DIAGNOSIS — K21.9 GASTROESOPHAGEAL REFLUX DISEASE: ICD-10-CM

## 2022-01-19 RX ORDER — ESOMEPRAZOLE MAGNESIUM 40 MG/1
40 CAPSULE, DELAYED RELEASE ORAL DAILY
Qty: 30 CAPSULE | Refills: 2 | Status: SHIPPED | OUTPATIENT
Start: 2022-01-19 | End: 2022-04-05 | Stop reason: SDUPTHER

## 2022-04-05 ENCOUNTER — OFFICE VISIT (OUTPATIENT)
Dept: GASTROENTEROLOGY | Facility: CLINIC | Age: 42
End: 2022-04-05

## 2022-04-05 VITALS
DIASTOLIC BLOOD PRESSURE: 83 MMHG | BODY MASS INDEX: 32.89 KG/M2 | HEIGHT: 68 IN | SYSTOLIC BLOOD PRESSURE: 147 MMHG | WEIGHT: 217 LBS | HEART RATE: 74 BPM

## 2022-04-05 DIAGNOSIS — K50.019 CROHN'S DISEASE OF SMALL INTESTINE WITH COMPLICATION: ICD-10-CM

## 2022-04-05 DIAGNOSIS — K21.9 GASTROESOPHAGEAL REFLUX DISEASE: ICD-10-CM

## 2022-04-05 DIAGNOSIS — K21.00 GASTROESOPHAGEAL REFLUX DISEASE WITH ESOPHAGITIS WITHOUT HEMORRHAGE: Primary | ICD-10-CM

## 2022-04-05 DIAGNOSIS — R19.7 DIARRHEA, UNSPECIFIED TYPE: ICD-10-CM

## 2022-04-05 PROCEDURE — 99213 OFFICE O/P EST LOW 20 MIN: CPT | Performed by: PHYSICIAN ASSISTANT

## 2022-04-05 RX ORDER — CYCLOBENZAPRINE HCL 5 MG
5 TABLET ORAL 3 TIMES DAILY PRN
COMMUNITY
Start: 2022-03-18

## 2022-04-05 RX ORDER — ESOMEPRAZOLE MAGNESIUM 40 MG/1
40 CAPSULE, DELAYED RELEASE ORAL DAILY
Qty: 30 CAPSULE | Refills: 2 | Status: SHIPPED | OUTPATIENT
Start: 2022-04-05 | End: 2022-06-23 | Stop reason: SDUPTHER

## 2022-04-05 RX ORDER — GLYCOPYRROLATE 2 MG/1
2 TABLET ORAL 2 TIMES DAILY
Qty: 60 TABLET | Refills: 2 | Status: SHIPPED | OUTPATIENT
Start: 2022-04-05 | End: 2022-06-20

## 2022-04-05 NOTE — PROGRESS NOTES
Chief Complaint   Patient presents with   • Crohn's Disease   • Heartburn   • Diarrhea       ENDO PROCEDURE ORDERED:    Subjective    Michel Harmon is a 41 y.o. male. he is here today for follow-up.    History of Present Illness    The patient is seen on a recheck of his Crohn disease, GERD, diarrhea, and abdominal pain.  Last seen 11/30/2021.  He had a BMP at that time that showed a BUN of 22, creatinine 1.53 with a GFR of 50.  I sent him to nephrology.  He saw SAMMY Fuentes, on 03/10/2022 at [facility].  We called the office to try to get the results of extensive laboratories and the renal ultrasound.  They refused to send us any results or notes.  Patient has a followup with her on Friday and they would not release anything despite the fact that I had referred the patient to them.      Patient complains of 2 out of 10 right-sided abdominal pain.  He is on Nexium for chronic heartburn.  He denied nausea, vomiting and dysphagia.  He does take the Levbid twice a day.  He is on 4 Lialda a day and has had loose stool.  Weight is up 7 pounds since last visit.  Last EGD/colonoscopy 10/07/2020 showed esophagitis, gastritis and ileitis.      ASSESSMENT AND PLAN:  Patient with chronic ileitis, GERD, diarrhea and abdominal pain secondary to the above.  Would consider getting a fecal calprotectin.  He is having some issues with the cost of his medications.  We will discontinue the Levbid as it costs him 60 dollars a month and we will try switching him to Robinul 2 mg b.i.d. and see if this does a little better to help with discomfort.  We will await the results of his renal studies and plan followup in a few months, sooner if needed.             The following portions of the patient's history were reviewed and updated as appropriate:   Past Medical History:   Diagnosis Date   • Crohn's disease (HCC)    • GERD (gastroesophageal reflux disease)    • Sleep apnea      Past Surgical History:   Procedure  Laterality Date   • COLON SURGERY  2015   • COLONOSCOPY     • COLONOSCOPY N/A 10/7/2020    Procedure: COLONOSCOPY--look TI;  Surgeon: Wisam Carpio MD;  Location: [facility] ENDOSCOPY;  Service: Gastroenterology;  Laterality: N/A;   • ENDOSCOPY N/A 10/7/2020    Procedure: ESOPHAGOGASTRODUODENOSCOPY--bx;  Surgeon: Wisam Carpio MD;  Location: [facility] ENDOSCOPY;  Service: Gastroenterology;  Laterality: N/A;   • UPPER GASTROINTESTINAL ENDOSCOPY  10/07/2020     Family History   Problem Relation Age of Onset   • Breast cancer Other    • Brain cancer Other    • Ulcers Other    • Bleeding Disorder Other        No Known Allergies  Social History     Socioeconomic History   • Marital status:    Tobacco Use   • Smoking status: Current Every Day Smoker     Packs/day: 1.00     Types: Cigarettes   • Smokeless tobacco: Never Used   Vaping Use   • Vaping Use: Never used   Substance and Sexual Activity   • Alcohol use: Not Currently   • Drug use: Not Currently   • Sexual activity: Defer     Current Medications:  Prior to Admission medications    Medication Sig Start Date End Date Taking? Authorizing Provider   Aspirin-Salicylamide-Caffeine (BC HEADACHE POWDER PO) Take  by mouth 2 (Two) Times a Day As Needed.   Yes Sharon Verduzco MD   cyclobenzaprine (FLEXERIL) 5 MG tablet  3/18/22  Yes Sharon Verduzco MD   esomeprazole (nexIUM) 40 MG capsule Take 1 capsule by mouth Daily. 1/19/22  Yes Dru Corrales PA-C   gabapentin (NEURONTIN) 600 MG tablet Take 300 mg by mouth 2 (Two) Times a Day.   Yes Sharon Verduzco MD   hyoscyamine (LEVBID) 0.375 MG 12 hr tablet Take 0.375 mg by mouth Every Night.   Yes Sharon Verduzco MD   melatonin 5 MG tablet tablet Take 5 mg by mouth As Needed.   Yes Sharon Verduzco MD   mesalamine (LIALDA) 1.2 g EC tablet Take 4,800 mg by mouth Daily With Breakfast.   Yes Sharon Verduzco MD   ondansetron (Zofran) 8 MG tablet Take 1 tablet by mouth Every 8 (Eight) Hours As  "Needed for Nausea or Vomiting. 12/1/20  Yes Dru Corrales PA-C   oxyCODONE-acetaminophen (PERCOCET)  MG per tablet Take 1 tablet by mouth 3 (Three) Times a Day. 8/6/20  Yes ProviderSharon MD   rizatriptan (MAXALT) 5 MG tablet TAKE ONE TABLET BY MOUTH AT ONSET OF HEADACHE; MAY REPEAT ONE TABLET IN 2 HOURS IF NEEDED. 12/10/20  Yes Elisabeth Caro APRN   tiZANidine (ZANAFLEX) 4 MG tablet Take 4 mg by mouth 2 (Two) Times a Day.    Provider, MD Sharon     Review of Systems  Review of Systems       Objective    /83   Pulse 74   Ht 172.7 cm (68\")   Wt 98.4 kg (217 lb) Comment: patient has on work steel toe shoes  BMI 32.99 kg/m²   Physical Exam  Vitals and nursing note reviewed.   Constitutional:       General: He is not in acute distress.     Appearance: He is well-developed. He is ill-appearing.   HENT:      Head: Normocephalic and atraumatic.   Eyes:      Pupils: Pupils are equal, round, and reactive to light.   Cardiovascular:      Rate and Rhythm: Normal rate and regular rhythm.      Heart sounds: Normal heart sounds.   Pulmonary:      Effort: Pulmonary effort is normal.      Breath sounds: Normal breath sounds.   Abdominal:      General: Bowel sounds are normal. There is no distension or abdominal bruit.      Palpations: Abdomen is soft. Abdomen is not rigid. There is no shifting dullness or mass.      Tenderness: There is abdominal tenderness. There is no guarding or rebound.      Hernia: No hernia is present. There is no hernia in the ventral area.   Musculoskeletal:         General: Normal range of motion.      Cervical back: Normal range of motion.   Skin:     General: Skin is warm and dry.   Neurological:      Mental Status: He is alert and oriented to person, place, and time.   Psychiatric:         Behavior: Behavior normal.         Thought Content: Thought content normal.         Judgment: Judgment normal.       Assessment/Plan      1. Gastroesophageal reflux disease " with esophagitis without hemorrhage    2. Crohn's disease of small intestine with complication (HCC)    3. Diarrhea, unspecified type    4. Gastroesophageal reflux disease    .   Diagnoses and all orders for this visit:    1. Gastroesophageal reflux disease with esophagitis without hemorrhage (Primary)    2. Crohn's disease of small intestine with complication (HCC)    3. Diarrhea, unspecified type    4. Gastroesophageal reflux disease  -     esomeprazole (nexIUM) 40 MG capsule; Take 1 capsule by mouth Daily.  Dispense: 30 capsule; Refill: 2    Other orders  -     glycopyrrolate (Robinul-Forte) 2 MG tablet; Take 1 tablet by mouth 2 (Two) Times a Day.  Dispense: 60 tablet; Refill: 2      Orders placed during this encounter include:  No orders of the defined types were placed in this encounter.      Medications prescribed:  New Medications Ordered This Visit   Medications   • glycopyrrolate (Robinul-Forte) 2 MG tablet     Sig: Take 1 tablet by mouth 2 (Two) Times a Day.     Dispense:  60 tablet     Refill:  2   • esomeprazole (nexIUM) 40 MG capsule     Sig: Take 1 capsule by mouth Daily.     Dispense:  30 capsule     Refill:  2       Requested Prescriptions     Signed Prescriptions Disp Refills   • glycopyrrolate (Robinul-Forte) 2 MG tablet 60 tablet 2     Sig: Take 1 tablet by mouth 2 (Two) Times a Day.   • esomeprazole (nexIUM) 40 MG capsule 30 capsule 2     Sig: Take 1 capsule by mouth Daily.       Review and/or summary of lab tests, radiology, procedures, medications. Review and summary of old records and obtaining of history. The risks and benefits of my recommendations, as well as other treatment options were discussed with the patient today. Questions were answered.    Follow-up: Return in about 3 months (around 7/5/2022), or if symptoms worsen or fail to improve.     * Surgery not found *      This document has been electronically signed by Dru Corrales PA-C on April 15, 2022 17:09 CDT      Results for  orders placed or performed in visit on 10/20/21   Basic Metabolic Panel    Specimen: Blood   Result Value Ref Range    Glucose 90 65 - 99 mg/dL    BUN 22 (H) 6 - 20 mg/dL    Creatinine 1.53 (H) 0.76 - 1.27 mg/dL    Sodium 141 136 - 145 mmol/L    Potassium 4.9 3.5 - 5.2 mmol/L    Chloride 103 98 - 107 mmol/L    CO2 28.4 22.0 - 29.0 mmol/L    Calcium 9.5 8.6 - 10.5 mg/dL    eGFR Non African Amer 50 (L) >60 mL/min/1.73    BUN/Creatinine Ratio 14.4 7.0 - 25.0    Anion Gap 9.6 5.0 - 15.0 mmol/L   Results for orders placed or performed in visit on 08/24/21   CBC Auto Differential    Specimen: Blood   Result Value Ref Range    WBC 9.99 3.40 - 10.80 10*3/mm3    RBC 5.46 4.14 - 5.80 10*6/mm3    Hemoglobin 17.7 13.0 - 17.7 g/dL    Hematocrit 49.9 37.5 - 51.0 %    MCV 91.4 79.0 - 97.0 fL    MCH 32.4 26.6 - 33.0 pg    MCHC 35.5 31.5 - 35.7 g/dL    RDW 13.7 12.3 - 15.4 %    RDW-SD 45.4 37.0 - 54.0 fl    MPV 10.4 6.0 - 12.0 fL    Platelets 293 140 - 450 10*3/mm3    Neutrophil % 60.6 42.7 - 76.0 %    Lymphocyte % 26.5 19.6 - 45.3 %    Monocyte % 7.8 5.0 - 12.0 %    Eosinophil % 3.7 0.3 - 6.2 %    Basophil % 1.0 0.0 - 1.5 %    Immature Grans % 0.4 0.0 - 0.5 %    Neutrophils, Absolute 6.05 1.70 - 7.00 10*3/mm3    Lymphocytes, Absolute 2.65 0.70 - 3.10 10*3/mm3    Monocytes, Absolute 0.78 0.10 - 0.90 10*3/mm3    Eosinophils, Absolute 0.37 0.00 - 0.40 10*3/mm3    Basophils, Absolute 0.10 0.00 - 0.20 10*3/mm3    Immature Grans, Absolute 0.04 0.00 - 0.05 10*3/mm3    nRBC 0.0 0.0 - 0.2 /100 WBC   Sedimentation Rate    Specimen: Blood   Result Value Ref Range    Sed Rate 5 0 - 15 mm/hr   C-reactive Protein    Specimen: Blood   Result Value Ref Range    C-Reactive Protein <0.30 0.00 - 0.50 mg/dL   Comprehensive Metabolic Panel    Specimen: Blood   Result Value Ref Range    Glucose 79 65 - 99 mg/dL    BUN 14 6 - 20 mg/dL    Creatinine 1.38 (H) 0.76 - 1.27 mg/dL    Sodium 144 136 - 145 mmol/L    Potassium 4.9 3.5 - 5.2 mmol/L    Chloride 107  98 - 107 mmol/L    CO2 22.7 22.0 - 29.0 mmol/L    Calcium 9.3 8.6 - 10.5 mg/dL    Total Protein 7.2 6.0 - 8.5 g/dL    Albumin 4.50 3.50 - 5.20 g/dL    ALT (SGPT) 29 1 - 41 U/L    AST (SGOT) 22 1 - 40 U/L    Alkaline Phosphatase 86 39 - 117 U/L    Total Bilirubin 0.6 0.0 - 1.2 mg/dL    eGFR Non African Amer 57 (L) >60 mL/min/1.73    Globulin 2.7 gm/dL    A/G Ratio 1.7 g/dL    BUN/Creatinine Ratio 10.1 7.0 - 25.0    Anion Gap 14.3 5.0 - 15.0 mmol/L   Results for orders placed or performed in visit on 12/01/20   CBC Auto Differential    Specimen: Blood   Result Value Ref Range    WBC 11.54 (H) 3.40 - 10.80 10*3/mm3    RBC 5.14 4.14 - 5.80 10*6/mm3    Hemoglobin 16.7 13.0 - 17.7 g/dL    Hematocrit 47.9 37.5 - 51.0 %    MCV 93.2 79.0 - 97.0 fL    MCH 32.5 26.6 - 33.0 pg    MCHC 34.9 31.5 - 35.7 g/dL    RDW 13.2 12.3 - 15.4 %    RDW-SD 45.3 37.0 - 54.0 fl    MPV 10.3 6.0 - 12.0 fL    Platelets 266 140 - 450 10*3/mm3    Neutrophil % 78.6 (H) 42.7 - 76.0 %    Lymphocyte % 15.0 (L) 19.6 - 45.3 %    Monocyte % 4.2 (L) 5.0 - 12.0 %    Eosinophil % 1.2 0.3 - 6.2 %    Basophil % 0.7 0.0 - 1.5 %    Immature Grans % 0.3 0.0 - 0.5 %    Neutrophils, Absolute 9.07 (H) 1.70 - 7.00 10*3/mm3    Lymphocytes, Absolute 1.73 0.70 - 3.10 10*3/mm3    Monocytes, Absolute 0.48 0.10 - 0.90 10*3/mm3    Eosinophils, Absolute 0.14 0.00 - 0.40 10*3/mm3    Basophils, Absolute 0.08 0.00 - 0.20 10*3/mm3    Immature Grans, Absolute 0.04 0.00 - 0.05 10*3/mm3    nRBC 0.0 0.0 - 0.2 /100 WBC   Sedimentation Rate    Specimen: Blood   Result Value Ref Range    Sed Rate 4 0 - 15 mm/hr   C-reactive Protein    Specimen: Blood   Result Value Ref Range    C-Reactive Protein 0.44 0.00 - 0.50 mg/dL   Lipase    Specimen: Blood   Result Value Ref Range    Lipase 48 13 - 60 U/L   Amylase    Specimen: Blood   Result Value Ref Range    Amylase 87 28 - 100 U/L   Comprehensive Metabolic Panel    Specimen: Blood   Result Value Ref Range    Glucose 119 (H) 65 - 99 mg/dL     BUN 15 6 - 20 mg/dL    Creatinine 1.25 0.76 - 1.27 mg/dL    Sodium 144 136 - 145 mmol/L    Potassium 4.6 3.5 - 5.2 mmol/L    Chloride 108 (H) 98 - 107 mmol/L    CO2 23.6 22.0 - 29.0 mmol/L    Calcium 9.3 8.6 - 10.5 mg/dL    Total Protein 7.4 6.0 - 8.5 g/dL    Albumin 4.60 3.50 - 5.20 g/dL    ALT (SGPT) 33 1 - 41 U/L    AST (SGOT) 21 1 - 40 U/L    Alkaline Phosphatase 94 39 - 117 U/L    Total Bilirubin 0.3 0.0 - 1.2 mg/dL    eGFR Non African Amer 64 >60 mL/min/1.73    Globulin 2.8 gm/dL    A/G Ratio 1.6 g/dL    BUN/Creatinine Ratio 12.0 7.0 - 25.0    Anion Gap 12.4 5.0 - 15.0 mmol/L     *Note: Due to a large number of results and/or encounters for the requested time period, some results have not been displayed. A complete set of results can be found in Results Review.

## 2022-06-16 NOTE — PROGRESS NOTES
Subjective:  Michel Harmon is a 41 y.o. male who presents for       Patient Active Problem List   Diagnosis   • Crohn's disease of small intestine with complication (HCC)   • Hiatal hernia   • Long-term use of immunosuppressant medication   • Obstructive sleep apnea syndrome   • Pancreatitis   • Perforation of small intestine (HCC)   • Tobacco dependence   • Chronic migraine   • Obesity (BMI 30-39.9)   • Nausea and vomiting   • Gastroesophageal reflux disease with esophagitis   • Diarrhea   • Migraine           Current Outpatient Medications:   •  Aspirin-Salicylamide-Caffeine (BC HEADACHE POWDER PO), Take  by mouth 2 (Two) Times a Day As Needed., Disp: , Rfl:   •  cyclobenzaprine (FLEXERIL) 5 MG tablet, , Disp: , Rfl:   •  esomeprazole (nexIUM) 40 MG capsule, Take 1 capsule by mouth Daily., Disp: 30 capsule, Rfl: 2  •  gabapentin (NEURONTIN) 300 MG capsule, , Disp: , Rfl:   •  gabapentin (NEURONTIN) 600 MG tablet, Take 300 mg by mouth 2 (Two) Times a Day., Disp: , Rfl:   •  glycopyrrolate (ROBINUL) 2 MG tablet, TAKE 1 TABLET TWICE DAILY., Disp: 60 tablet, Rfl: 0  •  melatonin 5 MG tablet tablet, Take 5 mg by mouth As Needed., Disp: , Rfl:   •  mesalamine (LIALDA) 1.2 g EC tablet, Take 4,800 mg by mouth Daily With Breakfast., Disp: , Rfl:   •  ondansetron (Zofran) 8 MG tablet, Take 1 tablet by mouth Every 8 (Eight) Hours As Needed for Nausea or Vomiting., Disp: 30 tablet, Rfl: 1  •  oxyCODONE-acetaminophen (PERCOCET)  MG per tablet, Take 1 tablet by mouth 3 (Three) Times a Day., Disp: , Rfl:   •  rizatriptan (MAXALT) 10 MG tablet, Take 1 tablet by mouth 1 (One) Time As Needed for Migraine for up to 1 dose., Disp: 10 tablet, Rfl: 3  •  vitamin D (ERGOCALCIFEROL) 1.25 MG (34946 UT) capsule capsule, Take 1 capsule by mouth Every 7 (Seven) Days., Disp: 5 capsule, Rfl: 3  •  ondansetron ODT (Zofran ODT) 8 MG disintegrating tablet, Place 1 tablet on the tongue Every 8 (Eight) Hours As Needed for Nausea or  Vomiting., Disp: 90 tablet, Rfl: 3    Pt is 42 yo male with management of obesity, crohn's disease, GERD with esophagitis. History of pancreatitis , insomnia, migrained headaches. Obesity history of perforation of small intestines, Nausea/vomiting, hiatal hernia, KEESHA, tobacco user, immunocopromised, high risk medication use, sp ileum surgery.      6/22/22 Pt is here to establish. He is from [facility] and moved here about 15 years ago. He has not seen PCP in years. Pt was seeing SAMMY Can He has a CMH of Crohn's disease and is on lialda 4800 mg daily with breakfast and on robinol 2 mg PO BID  He is on nexium 40 mg for GERD with esophagitis. He takes melatonin 5 mg as needed for insmonia. For migraine headaches pt is on maxlat 5 mg PO on onset of headaches. He also take neurontin and percocet for his chronic pain.   He currently sees various Specialist with Gastroenterlogy for his Crohn's disease since 2016. He did have ilieum surgery and has ileo-colonic anastomosis. in 2016. He develops a rupture around that time.  He also sees PM. For chronic pain on right side and is On Percocet and neuronitn. He also sees a Nephrologist once for his kidney issues . He has CKD stage 3a.  He does smoke tobacco about 20 years about 1 ppd. He does not want to quit.. he takes maxalt for his migraines that is helps.  He works with pest control. No alcohol use. No illicit drug use. His other major surgeries is a hernia surgery.        GI Problem  The primary symptoms include fatigue, abdominal pain and arthralgias. Primary symptoms do not include fever, nausea, vomiting or diarrhea.   The illness does not include chills.   Migraine  Providers seen:  None  Number of ER visits for headache:  0  Number of hospitalizations for headaches:  0  Abortive medications tried:  Rizatriptan  Obesity  This is a chronic problem. The current episode started more than 1 year ago. The problem occurs constantly. Associated symptoms include  abdominal pain, arthralgias, fatigue, headaches, numbness and weakness. Pertinent negatives include no chest pain, chills, congestion, coughing, diaphoresis, fever, nausea, sore throat or vomiting. Nothing aggravates the symptoms. He has tried nothing for the symptoms. The treatment provided no relief.       Review of Systems  Review of Systems   Constitutional: Positive for activity change and fatigue. Negative for appetite change, chills, diaphoresis and fever.   HENT: Negative for congestion, postnasal drip, rhinorrhea, sinus pressure, sinus pain, sneezing, sore throat, trouble swallowing and voice change.    Respiratory: Negative for cough, choking, chest tightness, shortness of breath, wheezing and stridor.    Cardiovascular: Negative for chest pain.   Gastrointestinal: Positive for abdominal pain. Negative for diarrhea, nausea and vomiting.   Musculoskeletal: Positive for arthralgias.   Neurological: Positive for weakness, numbness and headaches.       Patient Active Problem List   Diagnosis   • Crohn's disease of small intestine with complication (HCC)   • Hiatal hernia   • Long-term use of immunosuppressant medication   • Obstructive sleep apnea syndrome   • Pancreatitis   • Perforation of small intestine (HCC)   • Tobacco dependence   • Chronic migraine   • Obesity (BMI 30-39.9)   • Nausea and vomiting   • Gastroesophageal reflux disease with esophagitis   • Diarrhea   • Migraine     Past Surgical History:   Procedure Laterality Date   • COLON SURGERY  2015   • COLONOSCOPY     • COLONOSCOPY N/A 10/07/2020    Procedure: COLONOSCOPY--look TI;  Surgeon: Wisam Carpio MD;  Location: [facility] ENDOSCOPY;  Service: Gastroenterology;  Laterality: N/A;   • ENDOSCOPY N/A 10/07/2020    Procedure: ESOPHAGOGASTRODUODENOSCOPY--bx;  Surgeon: Wisam Carpio MD;  Location: [facility] ENDOSCOPY;  Service: Gastroenterology;  Laterality: N/A;   • HERNIA REPAIR  1981   • UPPER GASTROINTESTINAL ENDOSCOPY  10/07/2020     Social  History     Socioeconomic History   • Marital status:    Tobacco Use   • Smoking status: Current Every Day Smoker     Packs/day: 1.00     Years: 26.00     Pack years: 26.00     Types: Cigarettes, Cigarettes     Start date: 1/1/1996   • Smokeless tobacco: Never Used   Vaping Use   • Vaping Use: Never used   Substance and Sexual Activity   • Alcohol use: Not Currently   • Drug use: Never   • Sexual activity: Yes     Partners: Female     Birth control/protection: None     Comment: We're old     Family History   Problem Relation Age of Onset   • Breast cancer Other    • Brain cancer Other    • Ulcers Other    • Bleeding Disorder Other    • Anxiety disorder Sister    • Mental illness Sister         Involuntary commitment mid 90s   • Cancer Maternal Aunt         Breast cancer then brain cancer     No visits with results within 6 Month(s) from this visit.   Latest known visit with results is:   Orders Only on 10/20/2021   Component Date Value Ref Range Status   • Glucose 11/30/2021 90  65 - 99 mg/dL Final   • BUN 11/30/2021 22 (A) 6 - 20 mg/dL Final   • Creatinine 11/30/2021 1.53 (A) 0.76 - 1.27 mg/dL Final   • Sodium 11/30/2021 141  136 - 145 mmol/L Final   • Potassium 11/30/2021 4.9  3.5 - 5.2 mmol/L Final   • Chloride 11/30/2021 103  98 - 107 mmol/L Final   • CO2 11/30/2021 28.4  22.0 - 29.0 mmol/L Final   • Calcium 11/30/2021 9.5  8.6 - 10.5 mg/dL Final   • eGFR Non  Amer 11/30/2021 50 (A) >60 mL/min/1.73 Final   • BUN/Creatinine Ratio 11/30/2021 14.4  7.0 - 25.0 Final   • Anion Gap 11/30/2021 9.6  5.0 - 15.0 mmol/L Final      Fluoroscopy esophagram     Esophagram, 4/16/2013       HISTORY: Pneumomediastinum. Examination requested to rule out     esophageal perforation.       COMPARISON: None available       TECHNIQUE: Omnipaque, water-soluble contrast, followed by thin barium     was administered orally without complication with the patient upright     in the AP and LPO positions. Multiple fluoroscopic  images were then     obtained.       FINDINGS:  views demonstrate overlying monitoring leads without     evidence of intrathoracic abnormality. The patient swallowed normally.     Contrast images reveal no intraluminal filling defects throughout the     cervical and thoracic esophagus. No active extravasation to suggest a     leak or perforation. Esophageal motility was normal.       IMPRESSION: Normal contrasted esophagram.       Electronically signed by Dru Corrales on 4/16/2013 8:42 PM       Desirae CASTAÑEDA DO, have reviewed the images and verify the above     interpretation on 4/16/2013 10:49 PM.       Electronically signed by Desirae De La Rosa DO on 4/16/2013 10:49 PM  CT interpretation of outside films     Review of outside CT scan of the chest abdomen and pelvis with     intravenous contrast, 4/15/2013       History: Pneumomediastinum and right flank pain. Nausea and vomiting.     Lipase and amylase were elevated. History of peptic ulcer disease and     gastroesophageal reflux disease.       Comparison: Outside CT of the abdomen which included the lung bases     dated 4/5/2013.       Comments: There is small amount of gas within the inferior mediastinum.     Small hiatal hernia is present. There is mild edema of the lower     esophageal wall. This could be secondary to reflux disease. The heart     is within normal limits in size. Bibasilar patchy atelectasis present,     left greater than right. 5 mm nodule in the lingula is unchanged. No     pleural effusion. No mediastinal adenopathy.       There is vicarious excretion of contrast in the gallbladder. This is     demonstrated on the early arterial phase of the upper abdomen imaging.       A 1.1 cm hemangioma is seen in the dome of the liver. The pancreas is     homogeneous and sharply defined. There is no peripancreatic fluid     collection. No lesion is seen in the spleen.       The adrenals are not enlarged. A 3 mm right renal stone has  passed into     the distal aspect of the right ureter. There is mild dilatation of the     right ureter and renal pelvis. Two tiny stones are seen in the left     kidney without hydronephrosis.       There is edema and mucosal hyperenhancement of the distal small bowel,     including distal terminal ileum. There is some fatty infiltration in     the distal small bowel wall, indicative of previous episode of     enteritis. There are multiple small right lower quadrant mesenteric     lymph nodes noted. Mild hyperemia of the pelvic mesentery, adjacent to     edematous small bowel is present.       The urinary bladder appears normal. No free fluid is demonstrated.       Impression:     1. Edema of the distal esophageal wall and mild hiatal hernia. There is     small amount of pneumomediastinum.     2. Unchanged 5 mm nodule in the lingula. Bibasilar atelectasis, left     greater than right.     3. No CT evidence of acute pancreatitis.     4. Small hemangioma in the liver.     5. Distal small bowel edema with findings suggestive of acute on     chronic enteritis. Consider inflammatory bowel disease.     6. 3 mm stone in the distal onset of the right ureter with mild right     hydroureteronephrosis. Two tiny stones in the left kidney without     hydronephrosis.       Electronically signed by Romelia Kumari MD on 4/17/2013 10:59 AM  X-ray AP chest portable     HISTORY: V45.89 OTHER POST SURGICAL [id number] OTHER POST SURGICAL     STATUS.       VIEWS: Portable chest       COMPARISON: None.       FINDINGS:       There is bibasilar atelectasis, left greater than right. Cardiac size     is normal. The film was performed in expiration. There is barium     present throughout the colon.       IMPRESSION:       1. Low lung volumes with bibasilar atelectasis.       Electronically signed by Al Stearns MD on 4/18/2013 9:54 AM  X-ray AP chest portable     PORTABLE CHEST       HISTORY: SCREENING FOR PULMONARY TUBERCULOSIS  SCREENING FOR PULMONARY     TUBERCULOSIS       COMPARISON: Chest radiograph 4/18/2013       FINDINGS:     A single portable AP radiograph was performed of the chest.       The heart size is normal.       There is mild left basilar scarring or atelectasis.       There is no pleural effusion or pneumothorax.       IMPRESSION:     No acute cardiopulmonary abnormality. Specifically, no evidence of     mycobacterial infection.       Electronically signed by Yuan Baig MD on 5/14/2016 4:14 AM       ICatarino MD, have reviewed the images and verify the above     interpretation on 5/14/2016 6:16 AM.       Electronically signed by Catarino Malone MD on 5/14/2016 6:16 AM  CT abdomen pelvis enterography  Reviewed by: Yadira Nguyen on 07/20/2016 14:55;   --------       CT ABDOMEN AND PELVIS WITH CONTRAST       HISTORY: History of inflammatory bowel disease status post     ileocecectomy with primary anastomosis two months ago for perforation.       COMPARISON: CT abdomen pelvis 5/7/2016       DOSE REPORT: Total DLP (survey+Helical): Data not available       INTRAVENOUS CONTRAST ADMINISTRATION (milliliters of Omnipaque 350): 130       TECHNIQUE: CT was performed of the abdomen and pelvis following the     intravenous administration of iodinated contrast. 900 mL Volumen was     administered orally as well.       FINDINGS:       Few distal bronchial filling defects at the left lung base.       There has been interval ileocecectomy. There are matted loops of bowel     in the surgical site and adjacent to the surgical clips there is     questionable communication between the loops of small bowel (axial     image 98). There is adjacent fat stranding. There is no organized fluid     collection. There is no free intraperitoneal air.       New from prior is wall thickening, mucosal hyperenhancement, and     surrounding hyperemia involving the rectum and sigmoid colon as well as     the distal descending colon.       The  spleen, pancreas, liver, adrenals and kidneys have a normal     contrasted appearance.       The gallbladder is unremarkable.       IMPRESSION:     1. Interval ileocecectomy. Significant amount lower quadrant     inflammatory fat stranding and questionable communication between     adjacent loops of small bowel. This may represent small bowel-small     bowel fistula in the right lower quadrant adjacent to the surgical     site. Alternatively, this may represent postoperative scarring.     2. Findings of active inflammatory bowel disease involving the distal     descending and sigmoid colon as well as the rectum. No abscess or free     air.       I, Jw Tyler, have reviewed the images and verify the above     interpretation on 7/5/2016 4:09 PM.       Electronically signed by Jw Tyler on 7/5/2016 4:09 PM  CT abdomen pelvis enterography  Reviewed by: Velia Almanzar on 03/02/2017 13:55;   --------       EXAM: CT enterography abdomen and pelvis.       HISTORY:     K50.918 Crohn's disease, unspecified, with other complication Clinical     Question: Needs CT ENTEROGRAPHY, regional enteritis with abcess and     perforation       INTRAVENOUS CONTRAST ADMINISTRATION (mL Omnipaque 350): 100 mL       DOSE REPORT (Total DLP mGy*cm): 474       COMPARISON: 7/1/2016       TECHNIQUE: Multidetector 3 mm axial images were obtained from the lung     bases to the proximal femurs after the intravenous administration of     contrast. Oral volumen administered. Coronal and sagittal reformations     were provided.       FINDINGS:     LOWER CHEST: Bibasilar atelectasis and/or scarring. The heart size is     stable..       ABDOMEN:     Liver/Biliary Tract: With mild diffuse low-attenuation of the liver can     be seen with hepatic steatosis evaluation for underlying liver lesions.     Spleen: Within normal limits.       Pancreas: Within normal limits.       Adrenals: Within normal limits.       Kidneys: Within normal limits.        Bowel: Circumferential thickening of the distal esophagus with a small     hiatal hernia, unchanged. The stomach is incompletely distended. Small     bowel loops are normal in size and caliber. Postsurgical changes     ileocecectomy with reanastomosis in the right hemiabdomen. Focal     mucosal hyperenhancement at the anastomosis which could be related to     incomplete distention versus chronic inflammation. The colon is     incompletely distended. No pericolonic inflammatory changes.       Peritoneum: No free air, free fluid, or enhancing fluid collections to     suggest an abscess. Postsurgical changes ventral abdominal wall with a     fat-containing supraumbilical and fat-containing periumbilical ventral     hernia..       Vasculature: Within normal limits.       Lymph Nodes: Scattered subcentimeter nodes..       PELVIS:     Pelvic organs: The bladder is incompletely distended. Heterogeneous     prostate. No pelvic mass or ascites..       Bladder: Within normal limits.       Abdominal/Pelvic Wall: Within normal limits.       BONES: Within normal limits.       IMPRESSION:       1. Status post ileocecectomy with reanastomosis in the right lower     quadrant. Significant improvement in previously described inflammatory     changes with residual mild mucosal hyperenhancement of the distal ileum     near the anastomosis which may be related to incomplete distention     versus acute on chronic inflammation. Although multiple loops are     clustered in the right lower quadrant a previously described fistulous     communication in the right lower quadrant is not as conspicuous on this     exam. Continued attention on subsequent surveillance is recommended.     2. Additional findings as described above.       Electronically signed by Malachi Salgado MD on 3/2/2017 1:47 PM  Nuclear Medicine bone density hip spine axial  Narrative: BONE DENSITOMETRY, LUMBAR/HIP    HISTORY:  Z79.52 - Long term (current) use of systemic  steroids.    37-year-old male.    TECHNIQUE:    The bone mineral density (BMD) was determined using a dual energy   X-ray source (ActiveTrak). Fracture risk is related to the absolute   bone density and is therefore expressed as compared to a young   gender-matched population representative of the mean peak bone   density.      Bone density of the lumbar spine, (L1-L4): 1.328 grams per square cm  T-score (SD of peak BMD): 0.7  Z-score (SD of age-matched BMD): 0.0    Bone density of the neck of the left femur: 0.985 grams per square cm  T-score (SD of peak BMD): -0.7  Z-score (SD of age-matched BMD): -0.9    The lumbar bone density may be spuriously elevated due to vertebral   compression fracture, osteophytes, or aortic calcification, and the   femoral neck BMD may be falsely elevated in the presence of   degenerative changes.    For premenopausal women, men under age 50, and for children, Z-scores   are used to compare bone density to age-matched controls. A finding   of more than 2 standard deviations from an age and gender-matched   population (Z-score < - 2.0) is considered abnormal. The diagnosis of   osteoporosis in these populations requires clinical assessment of   additional risk factors.  Impression: 1. Bone density is within the expected range for age based on the   Z-score. Correlation with clinical factors is recommended.  2. There are no prior studies available for comparison.  3. Current recommendations are that a follow-up bone density be   obtained at an interval of not less than two years except in specific   circumstances, such as after initiation or change of therapy.    For patients not currently receiving treatment for diminished bone   density, FRAX (Fracture Risk Assessment Tool) software   (http://www.shef.ac.uk/FRAX) can be used to calculate the ten-year   fracture probability. The estimate, based on patient age, gender,   ethnicity, height, weight, femoral neck BMD T-score and several risk    factors, is endorsed by the World Health Organization. Current   guidelines recommend considering institution of medical therapy in   postmenopausal women and men age 50 or older with (a) a ten-year   fracture probability by FRAX of 3% or greater for hip fracture or 20%   or greater for major osteoporotic fracture, or (b) a T-score of minus   2.5 or lower at the spine or hip, or (c) a hip or vertebral fracture.  All treatment decisions require clinical judgment and consideration   of individual patient factors, including patient preferences,   comorbidities, previous drug use, risk factors not captured in the   FRAX model (e.g., frailty, falls, vitamin D deficiency, increased   bone turnover, interval significant decline in bone density) and   possible under- or over-estimation of fracture risk by FRAX.    Electronically signed by Ita Leroy on 3/16/2018 3:22 PM    Nadeen CASTAÑEDA MD, have reviewed the images and verify the   above interpretation on 3/16/2018 3:31 PM.    Electronically signed by  Nadeen Dumont MD on 3/16/2018 3:31 PM  MR enterography abdomen pelvis with contrast  Narrative: MR ENTEROGRAPHY ABD/PEL WITH CONTRAST    HISTORY: K50.019; Crohn's disease of small intestine with unspecified   complications.    TECHNIQUE: MRI of the abdomen and pelvis using an MR enterography   protocol. Images acquired before and after administration of 10 mL   Gadavist intravenous contrast.  The patient also ingested 900 mL of   Volumen oral contrast prior to imaging.    COMPARISON: CT enterography 3/2/2017 and 7/20/2016.    FINDINGS:  Problem specific findings:  There is suboptimal distention of much of the small bowel and distal   loops are almost completely collapsed, limiting evaluation for   mucosal abnormalities.    Postoperative changes of ileocolic resection and anastomosis. There   is mild differential mucosal enhancement of the juxta anastomotic   segment of ileum, spanning proximally 7 cm.  There are several loops   of more proximal small bowel adjacent to the anastomosis which show   mild differential mucosal enhancement. There is also stranding in the   associated mesentery. This is an area of previously seen possible   small bowel to small bowel fistula formation seen on 7/2016. In   addition, there is mild inflammatory change in the mid sigmoid colon   adjacent to these abnormal bowel loops.    No evidence of bowel obstruction. The colon is unremarkable. Perianal   region shows no evidence of fistula or abscess.    Additional findings:  Lower chest:  Unremarkable    Liver:  1.2 cm T2 hyperintense inferior right hepatic lobe nodule   shows hyperenhancement on portal venous phase, likely representing a   flash fill hemangioma and appears unchanged. No other focal hepatic   abnormality    Biliary:  Unremarkable    Spleen:  Unremarkable    Pancreas:  Unremarkable    Adrenals:  Unremarkable    Kidneys:  Tiny intraparenchymal T2 hyperintense focus along the   anterior interpolar cortex of the right kidney, too small to   characterize but likely a cyst. Unremarkable left kidney.    Lymph nodes:  No lymphadenopathy.    Vasculature:  Unremarkable    Peritoneum:  Unremarkable    Pelvic viscera:  The bladder is partially contracted. Prostate and   seminal vesicles are unremarkable.    Body wall:  Ventral laparotomy scar and fat-containing supraumbilical   hernia, enlarged from prior measuring up to 4.4 cm transversely.    Bones:  Normal marrow signal.  Impression: Mild acute on chronic inflammatory changes involving distal ileum   adjacent to site of ileocolic resection and anastomosis. Findings are   similar to the prior study with no current evidence of fistula,   abscess or obstruction.    Electronically signed by Charlie Patino MD on 11/13/2018 11:58 AM    Oscar CASTAÑEDA MD, have reviewed the images and verify the above   interpretation on 11/13/2018 2:02 PM.    Electronically signed by  Oscar  "MD Bella on 11/13/2018 2:02 PM    [unfilled]  Immunization History   Administered Date(s) Administered   • FluLaval/Fluarix/Fluzone >6 11/13/2018   • Fluzone Split Quad (Multi-dose) 11/10/2017   • Pneumococcal Conjugate 13-Valent (PCV13) 07/17/2018   • Pneumococcal Polysaccharide (PPSV23) 05/11/2016, 01/09/2017       The following portions of the patient's history were reviewed and updated as appropriate: allergies, current medications, past family history, past medical history, past social history, past surgical history and problem list.        Physical Exam  /80 (BP Location: Right arm, Patient Position: Sitting, Cuff Size: Adult)   Pulse 80   Temp 97.7 °F (36.5 °C)   Ht 172.7 cm (68\")   Wt 103 kg (226 lb 3.2 oz)   SpO2 98%   BMI 34.39 kg/m²     Physical Exam  Vitals and nursing note reviewed.   Constitutional:       Appearance: He is well-developed. He is not diaphoretic.   HENT:      Head: Normocephalic and atraumatic.      Right Ear: External ear normal.   Eyes:      Conjunctiva/sclera: Conjunctivae normal.      Pupils: Pupils are equal, round, and reactive to light.   Cardiovascular:      Rate and Rhythm: Normal rate and regular rhythm.      Heart sounds: Normal heart sounds. No murmur heard.  Pulmonary:      Effort: Pulmonary effort is normal. No respiratory distress.      Breath sounds: Normal breath sounds.   Abdominal:      General: Bowel sounds are normal. There is no distension.      Palpations: Abdomen is soft.      Tenderness: There is abdominal tenderness.      Comments: Obese abdomen    Musculoskeletal:         General: Tenderness present. No deformity. Normal range of motion.      Cervical back: Normal range of motion and neck supple.   Skin:     General: Skin is warm.      Coloration: Skin is not pale.      Findings: No erythema or rash.   Neurological:      Mental Status: He is alert and oriented to person, place, and time.      Cranial Nerves: No cranial nerve deficit. "   Psychiatric:         Behavior: Behavior normal.         [unfilled]   Diagnosis Plan   1. Class 1 obesity with serious comorbidity and body mass index (BMI) of 34.0 to 34.9 in adult, unspecified obesity type  CBC Auto Differential    Comprehensive Metabolic Panel    Hemoglobin A1c    Lipid Panel    TSH    T4, Free    Vitamin D 25 Hydroxy    Vitamin B12    Hepatitis C antibody   2. Crohn's disease of small intestine with complication (HCC)  CBC Auto Differential    Comprehensive Metabolic Panel    Hemoglobin A1c    Lipid Panel    TSH    T4, Free    Vitamin D 25 Hydroxy    Vitamin B12    Hepatitis C antibody   3. Long-term use of immunosuppressant medication  CBC Auto Differential    Comprehensive Metabolic Panel    Hemoglobin A1c    Lipid Panel    TSH    T4, Free    Vitamin D 25 Hydroxy    Vitamin B12    Hepatitis C antibody   4. Gastroesophageal reflux disease with esophagitis without hemorrhage  CBC Auto Differential    Comprehensive Metabolic Panel    Hemoglobin A1c    Lipid Panel    TSH    T4, Free    Vitamin D 25 Hydroxy    Vitamin B12    Hepatitis C antibody   5. Annual physical exam  CBC Auto Differential    Comprehensive Metabolic Panel    Hemoglobin A1c    Lipid Panel    TSH    T4, Free    Vitamin D 25 Hydroxy    Vitamin B12    Hepatitis C antibody   6. Encounter for screening for cardiovascular disorders  CBC Auto Differential    Comprehensive Metabolic Panel    Hemoglobin A1c    Lipid Panel    TSH    T4, Free    Vitamin D 25 Hydroxy    Vitamin B12    Hepatitis C antibody   7. Encounter for screening for diabetes mellitus  CBC Auto Differential    Comprehensive Metabolic Panel    Hemoglobin A1c    Lipid Panel    TSH    T4, Free    Vitamin D 25 Hydroxy    Vitamin B12    Hepatitis C antibody   8. Encounter for screening for endocrine disorder  CBC Auto Differential    Comprehensive Metabolic Panel    Hemoglobin A1c    Lipid Panel    TSH    T4, Free    Vitamin D 25 Hydroxy    Vitamin B12    Hepatitis C  antibody   9. Need for hepatitis C screening test  CBC Auto Differential    Comprehensive Metabolic Panel    Hemoglobin A1c    Lipid Panel    TSH    T4, Free    Vitamin D 25 Hydroxy    Vitamin B12    Hepatitis C antibody   10. Other migraine without status migrainosus, not intractable  CBC Auto Differential    Comprehensive Metabolic Panel    Hemoglobin A1c    Lipid Panel    TSH    T4, Free    Vitamin D 25 Hydroxy    Vitamin B12    Hepatitis C antibody   11. Tobacco user  CBC Auto Differential    Comprehensive Metabolic Panel    Hemoglobin A1c    Lipid Panel    TSH    T4, Free    Vitamin D 25 Hydroxy    Vitamin B12    Hepatitis C antibody   12. Tobacco abuse counseling  CBC Auto Differential    Comprehensive Metabolic Panel    Hemoglobin A1c    Lipid Panel    TSH    T4, Free    Vitamin D 25 Hydroxy    Vitamin B12    Hepatitis C antibody   13. Gastroesophageal reflux disease  CBC Auto Differential    Comprehensive Metabolic Panel    Hemoglobin A1c    Lipid Panel    TSH    T4, Free    Vitamin D 25 Hydroxy    Vitamin B12    Hepatitis C antibody    esomeprazole (nexIUM) 40 MG capsule        -recommend labwork  -annual physical exam today  -recommend pneumonia vaccination  -recommend COVID-19 vaccination  -hep C screening - hep C antibody  -N/V - zofran  8mg PO every 8 hours PRN    -high risk medication use / pain on right side -  PM following on neurontin and Percocet   -CKD stage 3a- Nephrology following   -Crohn's disease/GERD  - GI following on lialda 4800 mg PO qam on nexium 40 mg PO q daily.    -migraine headaches - on maxalt PRN   -obesity - counseled weight loss >5 minutes BMI at 34.39   -tobacco user - counseled quit smoking >5 minutes recommend 1 800 QUIT NOW   -advised pt to be safe and call with questions and concerns  -advised pt to go to ER or call 911 if symptoms worrisome or severe  -advised pt to followup with specialist and referrals  -advised pt to be safe during COVID-19 pandemic  I spent 45  minutes  caring for Michel on this date of service. This time includes time spent by me in the following activities: preparing for the visit, reviewing tests, obtaining and/or reviewing a separately obtained history, performing a medically appropriate examination and/or evaluation, counseling and educating the patient/family/caregiver, ordering medications, tests, or procedures, referring and communicating with other health care professionals, documenting information in the medical record and independently interpreting results and communicating that information with the patient/family/caregiver.   -recheck in 6 weeks         This document has been electronically signed by Dru Vicente MD on June 23, 2022 14:05 CDT

## 2022-06-20 RX ORDER — GLYCOPYRROLATE 2 MG/1
TABLET ORAL
Qty: 60 TABLET | Refills: 0 | Status: SHIPPED | OUTPATIENT
Start: 2022-06-20 | End: 2023-01-10 | Stop reason: SDUPTHER

## 2022-06-23 ENCOUNTER — OFFICE VISIT (OUTPATIENT)
Dept: FAMILY MEDICINE CLINIC | Facility: CLINIC | Age: 42
End: 2022-06-23

## 2022-06-23 VITALS
HEART RATE: 80 BPM | TEMPERATURE: 97.7 F | BODY MASS INDEX: 34.28 KG/M2 | SYSTOLIC BLOOD PRESSURE: 128 MMHG | OXYGEN SATURATION: 98 % | HEIGHT: 68 IN | WEIGHT: 226.2 LBS | DIASTOLIC BLOOD PRESSURE: 80 MMHG

## 2022-06-23 DIAGNOSIS — K50.019 CROHN'S DISEASE OF SMALL INTESTINE WITH COMPLICATION: ICD-10-CM

## 2022-06-23 DIAGNOSIS — Z13.1 ENCOUNTER FOR SCREENING FOR DIABETES MELLITUS: ICD-10-CM

## 2022-06-23 DIAGNOSIS — Z00.00 ANNUAL PHYSICAL EXAM: ICD-10-CM

## 2022-06-23 DIAGNOSIS — Z71.6 TOBACCO ABUSE COUNSELING: ICD-10-CM

## 2022-06-23 DIAGNOSIS — Z13.29 ENCOUNTER FOR SCREENING FOR ENDOCRINE DISORDER: ICD-10-CM

## 2022-06-23 DIAGNOSIS — K21.00 GASTROESOPHAGEAL REFLUX DISEASE WITH ESOPHAGITIS WITHOUT HEMORRHAGE: ICD-10-CM

## 2022-06-23 DIAGNOSIS — Z72.0 TOBACCO USER: ICD-10-CM

## 2022-06-23 DIAGNOSIS — E66.9 CLASS 1 OBESITY WITH SERIOUS COMORBIDITY AND BODY MASS INDEX (BMI) OF 34.0 TO 34.9 IN ADULT, UNSPECIFIED OBESITY TYPE: Primary | ICD-10-CM

## 2022-06-23 DIAGNOSIS — Z11.59 NEED FOR HEPATITIS C SCREENING TEST: ICD-10-CM

## 2022-06-23 DIAGNOSIS — Z79.60 LONG-TERM USE OF IMMUNOSUPPRESSANT MEDICATION: ICD-10-CM

## 2022-06-23 DIAGNOSIS — K21.9 GASTROESOPHAGEAL REFLUX DISEASE: ICD-10-CM

## 2022-06-23 DIAGNOSIS — Z13.6 ENCOUNTER FOR SCREENING FOR CARDIOVASCULAR DISORDERS: ICD-10-CM

## 2022-06-23 DIAGNOSIS — G43.809 OTHER MIGRAINE WITHOUT STATUS MIGRAINOSUS, NOT INTRACTABLE: ICD-10-CM

## 2022-06-23 PROBLEM — G43.909 MIGRAINE: Status: ACTIVE | Noted: 2022-06-23

## 2022-06-23 PROCEDURE — 99214 OFFICE O/P EST MOD 30 MIN: CPT | Performed by: FAMILY MEDICINE

## 2022-06-23 RX ORDER — RIZATRIPTAN BENZOATE 10 MG/1
10 TABLET ORAL ONCE AS NEEDED
Qty: 10 TABLET | Refills: 3 | Status: SHIPPED | OUTPATIENT
Start: 2022-06-23 | End: 2022-10-13 | Stop reason: SDUPTHER

## 2022-06-23 RX ORDER — RIZATRIPTAN BENZOATE 10 MG/1
TABLET ORAL
COMMUNITY
Start: 2022-03-18 | End: 2022-06-23 | Stop reason: SDUPTHER

## 2022-06-23 RX ORDER — GABAPENTIN 300 MG/1
CAPSULE ORAL
COMMUNITY
Start: 2022-06-20 | End: 2022-07-01 | Stop reason: SDUPTHER

## 2022-06-23 RX ORDER — ONDANSETRON 8 MG/1
8 TABLET, ORALLY DISINTEGRATING ORAL EVERY 8 HOURS PRN
Qty: 90 TABLET | Refills: 3 | Status: SHIPPED | OUTPATIENT
Start: 2022-06-23 | End: 2022-10-13 | Stop reason: SDUPTHER

## 2022-06-23 RX ORDER — ESOMEPRAZOLE MAGNESIUM 40 MG/1
40 CAPSULE, DELAYED RELEASE ORAL DAILY
Qty: 30 CAPSULE | Refills: 2 | Status: SHIPPED | OUTPATIENT
Start: 2022-06-23 | End: 2022-06-23 | Stop reason: SDUPTHER

## 2022-06-23 RX ORDER — ERGOCALCIFEROL 1.25 MG/1
50000 CAPSULE ORAL
Qty: 5 CAPSULE | Refills: 3 | Status: SHIPPED | OUTPATIENT
Start: 2022-06-23 | End: 2023-03-22

## 2022-06-23 RX ORDER — ERGOCALCIFEROL 1.25 MG/1
CAPSULE ORAL
COMMUNITY
Start: 2022-06-20 | End: 2022-06-23 | Stop reason: SDUPTHER

## 2022-06-23 RX ORDER — ONDANSETRON HYDROCHLORIDE 8 MG/1
8 TABLET, FILM COATED ORAL EVERY 8 HOURS PRN
Qty: 30 TABLET | Refills: 1 | Status: SHIPPED | OUTPATIENT
Start: 2022-06-23 | End: 2022-08-05 | Stop reason: SDUPTHER

## 2022-06-23 RX ORDER — ESOMEPRAZOLE MAGNESIUM 40 MG/1
40 CAPSULE, DELAYED RELEASE ORAL DAILY
Qty: 30 CAPSULE | Refills: 2 | Status: SHIPPED | OUTPATIENT
Start: 2022-06-23 | End: 2022-06-28 | Stop reason: CLARIF

## 2022-06-27 ENCOUNTER — TELEPHONE (OUTPATIENT)
Dept: FAMILY MEDICINE CLINIC | Facility: CLINIC | Age: 42
End: 2022-06-27

## 2022-06-28 RX ORDER — OMEPRAZOLE 40 MG/1
40 CAPSULE, DELAYED RELEASE ORAL DAILY
Qty: 30 CAPSULE | Refills: 3 | Status: SHIPPED | OUTPATIENT
Start: 2022-06-28 | End: 2022-08-05

## 2022-06-28 NOTE — TELEPHONE ENCOUNTER
Dru Vicente MD  You 17 hours ago (4:05 PM)       Try prilosec 40 mg daily give 30 pills and 3 refills     Message text

## 2022-07-01 ENCOUNTER — OFFICE VISIT (OUTPATIENT)
Dept: GASTROENTEROLOGY | Facility: CLINIC | Age: 42
End: 2022-07-01

## 2022-07-01 VITALS
WEIGHT: 225 LBS | HEIGHT: 68 IN | HEART RATE: 71 BPM | DIASTOLIC BLOOD PRESSURE: 89 MMHG | SYSTOLIC BLOOD PRESSURE: 128 MMHG | BODY MASS INDEX: 34.1 KG/M2

## 2022-07-01 DIAGNOSIS — R19.7 DIARRHEA, UNSPECIFIED TYPE: ICD-10-CM

## 2022-07-01 DIAGNOSIS — K21.00 GASTROESOPHAGEAL REFLUX DISEASE WITH ESOPHAGITIS WITHOUT HEMORRHAGE: Primary | ICD-10-CM

## 2022-07-01 DIAGNOSIS — K50.019 CROHN'S DISEASE OF SMALL INTESTINE WITH COMPLICATION: ICD-10-CM

## 2022-07-01 PROCEDURE — 99213 OFFICE O/P EST LOW 20 MIN: CPT | Performed by: PHYSICIAN ASSISTANT

## 2022-07-01 RX ORDER — TIZANIDINE 4 MG/1
4 TABLET ORAL 2 TIMES DAILY
COMMUNITY

## 2022-07-01 NOTE — PROGRESS NOTES
Chief Complaint   Patient presents with   • Heartburn   • Crohn's Disease   • Diarrhea       ENDO PROCEDURE ORDERED:    Subjective    Michel Harmon is a 41 y.o. male. he is here today for follow-up.    History of Present Illness    The patient is seen on a recheck of his GERD, Crohn’s, diarrhea, and abdominal pain.  Last seen 04/05/2022.  He was given Robinul.  He is taking 1 or 2 a day and states that it is helping.  He was on 4 of the Lialda a day.  Denies blood in his stool.  Weight is up 8 pounds since last visit.  He admits he is taking a lot of BC powder; I cautioned him in taking that.  Last EGD/colonoscopy 10/07/2020 showed esophagitis, gastritis and ileitis.  It is not clear if he is on Prilosec now.  He was previously on Nexium.  He does take Zofran p.r.n., but denied nausea or vomiting.      Patient did see SAMMY Fuentes, with [facility].  Patient had to bring his records; they never would release his results to us.  He had extensive laboratories on 03/18/2022.  Urinalysis showed ketones.  Protein electrophoresis, GRAYSON, hemoglobin and hematocrit, C4, C3, HIV, hepatitis C antibodies, uric acid, magnesium and PTH were all normal or negative.  Hepatitis B surface antibody quantitation was negative.  Renal function panel showed a creatinine of 1.27 with a GFR of 72, otherwise normal.  Renal ultrasound was negative from [facility] on 03/31/2022.      ASSESSMENT AND PLAN: Patient with chronic GERD, chronic ileitis with abdominal pain and diarrhea.  He appears stable on current regimen.  He is following with nephrology.  We will continue current medications.  We will plan followup in 3-6 months, sooner if needed.         The following portions of the patient's history were reviewed and updated as appropriate:   Past Medical History:   Diagnosis Date   • Anxiety 2016   • Asthma 2013    Sleep Apnea   • Cholelithiasis 2013   • Colon polyp 2018   • Crohn's disease (HCC)    • Erectile  dysfunction 2014   • GERD (gastroesophageal reflux disease)    • Headache 2016   • Irritable bowel syndrome 2016   • Kidney stone 2018   • Pancreatitis 2017   • Renal insufficiency 2022    3C   • Sleep apnea    • Stroke (HCC) 2015    During ielloscectomy   • Visual impairment 1985    Glasses     Past Surgical History:   Procedure Laterality Date   • COLON SURGERY  2015   • COLONOSCOPY     • COLONOSCOPY N/A 10/07/2020    Procedure: COLONOSCOPY--look TI;  Surgeon: Wisam Carpio MD;  Location: [facility] ENDOSCOPY;  Service: Gastroenterology;  Laterality: N/A;   • ENDOSCOPY N/A 10/07/2020    Procedure: ESOPHAGOGASTRODUODENOSCOPY--bx;  Surgeon: Wisam Carpio MD;  Location: [facility] ENDOSCOPY;  Service: Gastroenterology;  Laterality: N/A;   • HERNIA REPAIR  1981   • UPPER GASTROINTESTINAL ENDOSCOPY  10/07/2020     Family History   Problem Relation Age of Onset   • Breast cancer Other    • Brain cancer Other    • Ulcers Other    • Bleeding Disorder Other    • Anxiety disorder Sister    • Mental illness Sister         Involuntary commitment mid 90s   • Cancer Maternal Aunt         Breast cancer then brain cancer       No Known Allergies  Social History     Socioeconomic History   • Marital status:    Tobacco Use   • Smoking status: Current Every Day Smoker     Packs/day: 1.00     Years: 26.00     Pack years: 26.00     Types: Cigarettes, Cigarettes     Start date: 1/1/1996   • Smokeless tobacco: Never Used   Vaping Use   • Vaping Use: Never used   Substance and Sexual Activity   • Alcohol use: Not Currently   • Drug use: Never   • Sexual activity: Yes     Partners: Female     Birth control/protection: None     Comment: We're old     Current Medications:  Prior to Admission medications    Medication Sig Start Date End Date Taking? Authorizing Provider   Aspirin-Salicylamide-Caffeine (BC HEADACHE POWDER PO) Take  by mouth 2 (Two) Times a Day As Needed.   Yes Provider, MD Sharon   cyclobenzaprine (FLEXERIL) 5 MG  "tablet Take 5 mg by mouth 3 (Three) Times a Day As Needed. 3/18/22  Yes Sharon Verduzco MD   gabapentin (NEURONTIN) 300 MG capsule Take 300 mg by mouth 3 (Three) Times a Day.   Yes Sharon Verduzco MD   glycopyrrolate (ROBINUL) 2 MG tablet TAKE 1 TABLET TWICE DAILY. 6/20/22  Yes Dru Corrales PA-C   melatonin 5 MG tablet tablet Take 5 mg by mouth As Needed.   Yes Sharon Verduzco MD   mesalamine (LIALDA) 1.2 g EC tablet Take 4,800 mg by mouth Daily With Breakfast.   Yes Sharon Verduzco MD   omeprazole (priLOSEC) 40 MG capsule Take 1 capsule by mouth Daily. 6/28/22  Yes Dru Vicente MD   ondansetron (Zofran) 8 MG tablet Take 1 tablet by mouth Every 8 (Eight) Hours As Needed for Nausea or Vomiting. 6/23/22  Yes Dru Vicente MD   ondansetron ODT (Zofran ODT) 8 MG disintegrating tablet Place 1 tablet on the tongue Every 8 (Eight) Hours As Needed for Nausea or Vomiting. 6/23/22  Yes Dru Vicente MD   oxyCODONE-acetaminophen (PERCOCET)  MG per tablet Take 1 tablet by mouth 3 (Three) Times a Day. 8/6/20  Yes Sharon Verduzco MD   rizatriptan (MAXALT) 10 MG tablet Take 1 tablet by mouth 1 (One) Time As Needed for Migraine for up to 1 dose. 6/23/22  Yes Dru Vicente MD   tiZANidine (ZANAFLEX) 4 MG tablet Take 4 mg by mouth 2 (Two) Times a Day.   Yes Sharon Verduzco MD   vitamin D (ERGOCALCIFEROL) 1.25 MG (13431 UT) capsule capsule Take 1 capsule by mouth Every 7 (Seven) Days. 6/23/22  Yes Dru Vicente MD   gabapentin (NEURONTIN) 300 MG capsule  6/20/22 7/1/22 Yes Sharon Verduzco MD     Review of Systems  Review of Systems       Objective    /89   Pulse 71   Ht 172.7 cm (68\")   Wt 102 kg (225 lb)   BMI 34.21 kg/m²   Physical Exam  Vitals and nursing note reviewed.   Constitutional:       General: He is not in acute distress.     Appearance: He is well-developed.   HENT:      Head: Normocephalic and atraumatic.   Eyes:      Pupils: Pupils are equal, " round, and reactive to light.   Cardiovascular:      Rate and Rhythm: Normal rate and regular rhythm.      Heart sounds: Normal heart sounds.   Pulmonary:      Effort: Pulmonary effort is normal.      Breath sounds: Normal breath sounds.   Abdominal:      General: Bowel sounds are normal. There is no distension or abdominal bruit.      Palpations: Abdomen is soft. Abdomen is not rigid. There is no shifting dullness or mass.      Tenderness: There is abdominal tenderness. There is no guarding or rebound.      Hernia: No hernia is present. There is no hernia in the ventral area.   Musculoskeletal:         General: Normal range of motion.      Cervical back: Normal range of motion.   Skin:     General: Skin is warm and dry.   Neurological:      Mental Status: He is alert and oriented to person, place, and time.   Psychiatric:         Behavior: Behavior normal.         Thought Content: Thought content normal.         Judgment: Judgment normal.       Assessment & Plan      1. Gastroesophageal reflux disease with esophagitis without hemorrhage    2. Crohn's disease of small intestine with complication (HCC)    3. Diarrhea, unspecified type    .   Diagnoses and all orders for this visit:    1. Gastroesophageal reflux disease with esophagitis without hemorrhage (Primary)    2. Crohn's disease of small intestine with complication (HCC)    3. Diarrhea, unspecified type        Orders placed during this encounter include:  No orders of the defined types were placed in this encounter.      Medications prescribed:  No orders of the defined types were placed in this encounter.    Discontinued Medications       Reason for Discontinue     gabapentin (NEURONTIN) 300 MG capsule    Duplicate order        Requested Prescriptions      No prescriptions requested or ordered in this encounter       Review and/or summary of lab tests, radiology, procedures, medications. Review and summary of old records and obtaining of history. The risks  and benefits of my recommendations, as well as other treatment options were discussed with the patient today. Questions were answered.    Follow-up: Return in about 6 months (around 1/1/2023), or if symptoms worsen or fail to improve.     * Surgery not found *      This document has been electronically signed by Dru Corrales PA-C on July 8, 2022 14:44 CDT      Results for orders placed or performed in visit on 10/20/21   Basic Metabolic Panel    Specimen: Blood   Result Value Ref Range    Glucose 90 65 - 99 mg/dL    BUN 22 (H) 6 - 20 mg/dL    Creatinine 1.53 (H) 0.76 - 1.27 mg/dL    Sodium 141 136 - 145 mmol/L    Potassium 4.9 3.5 - 5.2 mmol/L    Chloride 103 98 - 107 mmol/L    CO2 28.4 22.0 - 29.0 mmol/L    Calcium 9.5 8.6 - 10.5 mg/dL    eGFR Non African Amer 50 (L) >60 mL/min/1.73    BUN/Creatinine Ratio 14.4 7.0 - 25.0    Anion Gap 9.6 5.0 - 15.0 mmol/L   Results for orders placed or performed in visit on 08/24/21   CBC Auto Differential    Specimen: Blood   Result Value Ref Range    WBC 9.99 3.40 - 10.80 10*3/mm3    RBC 5.46 4.14 - 5.80 10*6/mm3    Hemoglobin 17.7 13.0 - 17.7 g/dL    Hematocrit 49.9 37.5 - 51.0 %    MCV 91.4 79.0 - 97.0 fL    MCH 32.4 26.6 - 33.0 pg    MCHC 35.5 31.5 - 35.7 g/dL    RDW 13.7 12.3 - 15.4 %    RDW-SD 45.4 37.0 - 54.0 fl    MPV 10.4 6.0 - 12.0 fL    Platelets 293 140 - 450 10*3/mm3    Neutrophil % 60.6 42.7 - 76.0 %    Lymphocyte % 26.5 19.6 - 45.3 %    Monocyte % 7.8 5.0 - 12.0 %    Eosinophil % 3.7 0.3 - 6.2 %    Basophil % 1.0 0.0 - 1.5 %    Immature Grans % 0.4 0.0 - 0.5 %    Neutrophils, Absolute 6.05 1.70 - 7.00 10*3/mm3    Lymphocytes, Absolute 2.65 0.70 - 3.10 10*3/mm3    Monocytes, Absolute 0.78 0.10 - 0.90 10*3/mm3    Eosinophils, Absolute 0.37 0.00 - 0.40 10*3/mm3    Basophils, Absolute 0.10 0.00 - 0.20 10*3/mm3    Immature Grans, Absolute 0.04 0.00 - 0.05 10*3/mm3    nRBC 0.0 0.0 - 0.2 /100 WBC   Sedimentation Rate    Specimen: Blood   Result Value Ref Range    Sed  Rate 5 0 - 15 mm/hr   C-reactive Protein    Specimen: Blood   Result Value Ref Range    C-Reactive Protein <0.30 0.00 - 0.50 mg/dL   Comprehensive Metabolic Panel    Specimen: Blood   Result Value Ref Range    Glucose 79 65 - 99 mg/dL    BUN 14 6 - 20 mg/dL    Creatinine 1.38 (H) 0.76 - 1.27 mg/dL    Sodium 144 136 - 145 mmol/L    Potassium 4.9 3.5 - 5.2 mmol/L    Chloride 107 98 - 107 mmol/L    CO2 22.7 22.0 - 29.0 mmol/L    Calcium 9.3 8.6 - 10.5 mg/dL    Total Protein 7.2 6.0 - 8.5 g/dL    Albumin 4.50 3.50 - 5.20 g/dL    ALT (SGPT) 29 1 - 41 U/L    AST (SGOT) 22 1 - 40 U/L    Alkaline Phosphatase 86 39 - 117 U/L    Total Bilirubin 0.6 0.0 - 1.2 mg/dL    eGFR Non African Amer 57 (L) >60 mL/min/1.73    Globulin 2.7 gm/dL    A/G Ratio 1.7 g/dL    BUN/Creatinine Ratio 10.1 7.0 - 25.0    Anion Gap 14.3 5.0 - 15.0 mmol/L   Results for orders placed or performed in visit on 12/01/20   CBC Auto Differential    Specimen: Blood   Result Value Ref Range    WBC 11.54 (H) 3.40 - 10.80 10*3/mm3    RBC 5.14 4.14 - 5.80 10*6/mm3    Hemoglobin 16.7 13.0 - 17.7 g/dL    Hematocrit 47.9 37.5 - 51.0 %    MCV 93.2 79.0 - 97.0 fL    MCH 32.5 26.6 - 33.0 pg    MCHC 34.9 31.5 - 35.7 g/dL    RDW 13.2 12.3 - 15.4 %    RDW-SD 45.3 37.0 - 54.0 fl    MPV 10.3 6.0 - 12.0 fL    Platelets 266 140 - 450 10*3/mm3    Neutrophil % 78.6 (H) 42.7 - 76.0 %    Lymphocyte % 15.0 (L) 19.6 - 45.3 %    Monocyte % 4.2 (L) 5.0 - 12.0 %    Eosinophil % 1.2 0.3 - 6.2 %    Basophil % 0.7 0.0 - 1.5 %    Immature Grans % 0.3 0.0 - 0.5 %    Neutrophils, Absolute 9.07 (H) 1.70 - 7.00 10*3/mm3    Lymphocytes, Absolute 1.73 0.70 - 3.10 10*3/mm3    Monocytes, Absolute 0.48 0.10 - 0.90 10*3/mm3    Eosinophils, Absolute 0.14 0.00 - 0.40 10*3/mm3    Basophils, Absolute 0.08 0.00 - 0.20 10*3/mm3    Immature Grans, Absolute 0.04 0.00 - 0.05 10*3/mm3    nRBC 0.0 0.0 - 0.2 /100 WBC   Sedimentation Rate    Specimen: Blood   Result Value Ref Range    Sed Rate 4 0 - 15 mm/hr    C-reactive Protein    Specimen: Blood   Result Value Ref Range    C-Reactive Protein 0.44 0.00 - 0.50 mg/dL   Lipase    Specimen: Blood   Result Value Ref Range    Lipase 48 13 - 60 U/L   Amylase    Specimen: Blood   Result Value Ref Range    Amylase 87 28 - 100 U/L   Comprehensive Metabolic Panel    Specimen: Blood   Result Value Ref Range    Glucose 119 (H) 65 - 99 mg/dL    BUN 15 6 - 20 mg/dL    Creatinine 1.25 0.76 - 1.27 mg/dL    Sodium 144 136 - 145 mmol/L    Potassium 4.6 3.5 - 5.2 mmol/L    Chloride 108 (H) 98 - 107 mmol/L    CO2 23.6 22.0 - 29.0 mmol/L    Calcium 9.3 8.6 - 10.5 mg/dL    Total Protein 7.4 6.0 - 8.5 g/dL    Albumin 4.60 3.50 - 5.20 g/dL    ALT (SGPT) 33 1 - 41 U/L    AST (SGOT) 21 1 - 40 U/L    Alkaline Phosphatase 94 39 - 117 U/L    Total Bilirubin 0.3 0.0 - 1.2 mg/dL    eGFR Non African Amer 64 >60 mL/min/1.73    Globulin 2.8 gm/dL    A/G Ratio 1.6 g/dL    BUN/Creatinine Ratio 12.0 7.0 - 25.0    Anion Gap 12.4 5.0 - 15.0 mmol/L     *Note: Due to a large number of results and/or encounters for the requested time period, some results have not been displayed. A complete set of results can be found in Results Review.

## 2022-08-05 ENCOUNTER — LAB (OUTPATIENT)
Dept: LAB | Facility: HOSPITAL | Age: 42
End: 2022-08-05

## 2022-08-05 ENCOUNTER — OFFICE VISIT (OUTPATIENT)
Dept: FAMILY MEDICINE CLINIC | Facility: CLINIC | Age: 42
End: 2022-08-05

## 2022-08-05 VITALS
OXYGEN SATURATION: 96 % | HEIGHT: 68 IN | BODY MASS INDEX: 34.07 KG/M2 | DIASTOLIC BLOOD PRESSURE: 98 MMHG | SYSTOLIC BLOOD PRESSURE: 142 MMHG | WEIGHT: 224.8 LBS | TEMPERATURE: 97.1 F | HEART RATE: 89 BPM

## 2022-08-05 DIAGNOSIS — Z13.29 ENCOUNTER FOR SCREENING FOR ENDOCRINE DISORDER: ICD-10-CM

## 2022-08-05 DIAGNOSIS — K21.00 GASTROESOPHAGEAL REFLUX DISEASE WITH ESOPHAGITIS WITHOUT HEMORRHAGE: ICD-10-CM

## 2022-08-05 DIAGNOSIS — Z71.6 TOBACCO ABUSE COUNSELING: ICD-10-CM

## 2022-08-05 DIAGNOSIS — R03.0 ELEVATED BLOOD PRESSURE READING: ICD-10-CM

## 2022-08-05 DIAGNOSIS — R10.31 RIGHT LOWER QUADRANT ABDOMINAL PAIN: Primary | ICD-10-CM

## 2022-08-05 DIAGNOSIS — Z11.59 NEED FOR HEPATITIS C SCREENING TEST: ICD-10-CM

## 2022-08-05 DIAGNOSIS — Z13.6 ENCOUNTER FOR SCREENING FOR CARDIOVASCULAR DISORDERS: ICD-10-CM

## 2022-08-05 DIAGNOSIS — Z79.899 HIGH RISK MEDICATION USE: ICD-10-CM

## 2022-08-05 DIAGNOSIS — K21.9 GASTROESOPHAGEAL REFLUX DISEASE: ICD-10-CM

## 2022-08-05 DIAGNOSIS — G43.809 OTHER MIGRAINE WITHOUT STATUS MIGRAINOSUS, NOT INTRACTABLE: ICD-10-CM

## 2022-08-05 DIAGNOSIS — N18.31 STAGE 3A CHRONIC KIDNEY DISEASE: ICD-10-CM

## 2022-08-05 DIAGNOSIS — Z72.0 TOBACCO USER: ICD-10-CM

## 2022-08-05 DIAGNOSIS — R10.31 RIGHT LOWER QUADRANT ABDOMINAL PAIN: ICD-10-CM

## 2022-08-05 DIAGNOSIS — K50.019 CROHN'S DISEASE OF SMALL INTESTINE WITH COMPLICATION: ICD-10-CM

## 2022-08-05 DIAGNOSIS — Z00.00 ANNUAL PHYSICAL EXAM: ICD-10-CM

## 2022-08-05 DIAGNOSIS — Z13.1 ENCOUNTER FOR SCREENING FOR DIABETES MELLITUS: ICD-10-CM

## 2022-08-05 DIAGNOSIS — E66.9 CLASS 1 OBESITY WITH SERIOUS COMORBIDITY AND BODY MASS INDEX (BMI) OF 34.0 TO 34.9 IN ADULT, UNSPECIFIED OBESITY TYPE: ICD-10-CM

## 2022-08-05 DIAGNOSIS — Z79.60 LONG-TERM USE OF IMMUNOSUPPRESSANT MEDICATION: ICD-10-CM

## 2022-08-05 DIAGNOSIS — Z87.442 HISTORY OF KIDNEY STONES: ICD-10-CM

## 2022-08-05 PROCEDURE — 81001 URINALYSIS AUTO W/SCOPE: CPT

## 2022-08-05 PROCEDURE — 83036 HEMOGLOBIN GLYCOSYLATED A1C: CPT

## 2022-08-05 PROCEDURE — 84443 ASSAY THYROID STIM HORMONE: CPT

## 2022-08-05 PROCEDURE — 80053 COMPREHEN METABOLIC PANEL: CPT

## 2022-08-05 PROCEDURE — 87086 URINE CULTURE/COLONY COUNT: CPT

## 2022-08-05 PROCEDURE — 82607 VITAMIN B-12: CPT

## 2022-08-05 PROCEDURE — 82306 VITAMIN D 25 HYDROXY: CPT

## 2022-08-05 PROCEDURE — 84439 ASSAY OF FREE THYROXINE: CPT

## 2022-08-05 PROCEDURE — 99214 OFFICE O/P EST MOD 30 MIN: CPT | Performed by: FAMILY MEDICINE

## 2022-08-05 PROCEDURE — 86803 HEPATITIS C AB TEST: CPT

## 2022-08-05 PROCEDURE — 80061 LIPID PANEL: CPT

## 2022-08-05 PROCEDURE — 85025 COMPLETE CBC W/AUTO DIFF WBC: CPT

## 2022-08-05 RX ORDER — PANTOPRAZOLE SODIUM 40 MG/1
40 TABLET, DELAYED RELEASE ORAL DAILY
Qty: 30 TABLET | Refills: 3 | Status: SHIPPED | OUTPATIENT
Start: 2022-08-05 | End: 2022-10-13

## 2022-08-06 LAB
25(OH)D3 SERPL-MCNC: 42.7 NG/ML (ref 30–100)
ALBUMIN SERPL-MCNC: 4.6 G/DL (ref 3.5–5.2)
ALBUMIN/GLOB SERPL: 2.3 G/DL
ALP SERPL-CCNC: 70 U/L (ref 39–117)
ALT SERPL W P-5'-P-CCNC: 30 U/L (ref 1–41)
ANION GAP SERPL CALCULATED.3IONS-SCNC: 14.8 MMOL/L (ref 5–15)
AST SERPL-CCNC: 21 U/L (ref 1–40)
BACTERIA SPEC AEROBE CULT: NO GROWTH
BACTERIA UR QL AUTO: NORMAL /HPF
BASOPHILS # BLD AUTO: 0.08 10*3/MM3 (ref 0–0.2)
BASOPHILS NFR BLD AUTO: 0.9 % (ref 0–1.5)
BILIRUB SERPL-MCNC: 0.4 MG/DL (ref 0–1.2)
BILIRUB UR QL STRIP: NEGATIVE
BUN SERPL-MCNC: 17 MG/DL (ref 6–20)
BUN/CREAT SERPL: 12.2 (ref 7–25)
CALCIUM SPEC-SCNC: 8.8 MG/DL (ref 8.6–10.5)
CHLORIDE SERPL-SCNC: 103 MMOL/L (ref 98–107)
CHOLEST SERPL-MCNC: 188 MG/DL (ref 0–200)
CLARITY UR: ABNORMAL
CO2 SERPL-SCNC: 24.2 MMOL/L (ref 22–29)
COLOR UR: YELLOW
CREAT SERPL-MCNC: 1.39 MG/DL (ref 0.76–1.27)
DEPRECATED RDW RBC AUTO: 45.5 FL (ref 37–54)
EGFRCR SERPLBLD CKD-EPI 2021: 64.5 ML/MIN/1.73
EOSINOPHIL # BLD AUTO: 0.41 10*3/MM3 (ref 0–0.4)
EOSINOPHIL NFR BLD AUTO: 4.7 % (ref 0.3–6.2)
ERYTHROCYTE [DISTWIDTH] IN BLOOD BY AUTOMATED COUNT: 13.5 % (ref 12.3–15.4)
GLOBULIN UR ELPH-MCNC: 2 GM/DL
GLUCOSE SERPL-MCNC: 100 MG/DL (ref 65–99)
GLUCOSE UR STRIP-MCNC: NEGATIVE MG/DL
HBA1C MFR BLD: 5.4 % (ref 4.8–5.6)
HCT VFR BLD AUTO: 46.3 % (ref 37.5–51)
HCV AB SER DONR QL: NORMAL
HDLC SERPL-MCNC: 43 MG/DL (ref 40–60)
HGB BLD-MCNC: 16.1 G/DL (ref 13–17.7)
HGB UR QL STRIP.AUTO: NEGATIVE
HYALINE CASTS UR QL AUTO: NORMAL /LPF
IMM GRANULOCYTES # BLD AUTO: 0.03 10*3/MM3 (ref 0–0.05)
IMM GRANULOCYTES NFR BLD AUTO: 0.3 % (ref 0–0.5)
KETONES UR QL STRIP: NEGATIVE
LDLC SERPL CALC-MCNC: 120 MG/DL (ref 0–100)
LDLC/HDLC SERPL: 2.72 {RATIO}
LEUKOCYTE ESTERASE UR QL STRIP.AUTO: NEGATIVE
LYMPHOCYTES # BLD AUTO: 2.59 10*3/MM3 (ref 0.7–3.1)
LYMPHOCYTES NFR BLD AUTO: 29.7 % (ref 19.6–45.3)
MCH RBC QN AUTO: 32 PG (ref 26.6–33)
MCHC RBC AUTO-ENTMCNC: 34.8 G/DL (ref 31.5–35.7)
MCV RBC AUTO: 92 FL (ref 79–97)
MONOCYTES # BLD AUTO: 0.68 10*3/MM3 (ref 0.1–0.9)
MONOCYTES NFR BLD AUTO: 7.8 % (ref 5–12)
NEUTROPHILS NFR BLD AUTO: 4.93 10*3/MM3 (ref 1.7–7)
NEUTROPHILS NFR BLD AUTO: 56.6 % (ref 42.7–76)
NITRITE UR QL STRIP: NEGATIVE
NRBC BLD AUTO-RTO: 0 /100 WBC (ref 0–0.2)
PH UR STRIP.AUTO: 5.5 [PH] (ref 5–8)
PLATELET # BLD AUTO: 251 10*3/MM3 (ref 140–450)
PMV BLD AUTO: 10.3 FL (ref 6–12)
POTASSIUM SERPL-SCNC: 4.3 MMOL/L (ref 3.5–5.2)
PROT SERPL-MCNC: 6.6 G/DL (ref 6–8.5)
PROT UR QL STRIP: NEGATIVE
RBC # BLD AUTO: 5.03 10*6/MM3 (ref 4.14–5.8)
RBC # UR STRIP: NORMAL /HPF
REF LAB TEST METHOD: NORMAL
SODIUM SERPL-SCNC: 142 MMOL/L (ref 136–145)
SP GR UR STRIP: 1.03 (ref 1–1.03)
SQUAMOUS #/AREA URNS HPF: NORMAL /HPF
T4 FREE SERPL-MCNC: 0.85 NG/DL (ref 0.93–1.7)
TRIGL SERPL-MCNC: 141 MG/DL (ref 0–150)
TSH SERPL DL<=0.05 MIU/L-ACNC: 2.15 UIU/ML (ref 0.27–4.2)
UROBILINOGEN UR QL STRIP: ABNORMAL
VIT B12 BLD-MCNC: >2000 PG/ML (ref 211–946)
VLDLC SERPL-MCNC: 25 MG/DL (ref 5–40)
WBC # UR STRIP: NORMAL /HPF
WBC NRBC COR # BLD: 8.72 10*3/MM3 (ref 3.4–10.8)

## 2022-10-13 ENCOUNTER — OFFICE VISIT (OUTPATIENT)
Dept: FAMILY MEDICINE CLINIC | Facility: CLINIC | Age: 42
End: 2022-10-13

## 2022-10-13 VITALS
TEMPERATURE: 98.2 F | DIASTOLIC BLOOD PRESSURE: 80 MMHG | HEART RATE: 89 BPM | SYSTOLIC BLOOD PRESSURE: 124 MMHG | BODY MASS INDEX: 34.53 KG/M2 | OXYGEN SATURATION: 95 % | WEIGHT: 227.8 LBS | HEIGHT: 68 IN

## 2022-10-13 DIAGNOSIS — E78.2 MIXED HYPERLIPIDEMIA: ICD-10-CM

## 2022-10-13 DIAGNOSIS — N18.31 STAGE 3A CHRONIC KIDNEY DISEASE: ICD-10-CM

## 2022-10-13 DIAGNOSIS — M79.631 RIGHT FOREARM PAIN: Primary | ICD-10-CM

## 2022-10-13 DIAGNOSIS — G43.809 OTHER MIGRAINE WITHOUT STATUS MIGRAINOSUS, NOT INTRACTABLE: ICD-10-CM

## 2022-10-13 DIAGNOSIS — Z72.0 TOBACCO USER: ICD-10-CM

## 2022-10-13 DIAGNOSIS — K50.019 CROHN'S DISEASE OF SMALL INTESTINE WITH COMPLICATION: ICD-10-CM

## 2022-10-13 DIAGNOSIS — R94.6 ABNORMAL THYROID FUNCTION TEST: ICD-10-CM

## 2022-10-13 DIAGNOSIS — M79.672 BILATERAL FOOT PAIN: ICD-10-CM

## 2022-10-13 DIAGNOSIS — M79.671 BILATERAL FOOT PAIN: ICD-10-CM

## 2022-10-13 DIAGNOSIS — E66.9 CLASS 1 OBESITY WITH SERIOUS COMORBIDITY AND BODY MASS INDEX (BMI) OF 34.0 TO 34.9 IN ADULT, UNSPECIFIED OBESITY TYPE: ICD-10-CM

## 2022-10-13 PROCEDURE — 99214 OFFICE O/P EST MOD 30 MIN: CPT | Performed by: FAMILY MEDICINE

## 2022-10-13 RX ORDER — ESOMEPRAZOLE MAGNESIUM 40 MG/1
40 CAPSULE, DELAYED RELEASE ORAL
Qty: 30 CAPSULE | Refills: 3 | Status: SHIPPED | OUTPATIENT
Start: 2022-10-13 | End: 2022-11-16 | Stop reason: SDUPTHER

## 2022-10-13 RX ORDER — RIZATRIPTAN BENZOATE 10 MG/1
10 TABLET ORAL ONCE AS NEEDED
Qty: 10 TABLET | Refills: 3 | Status: SHIPPED | OUTPATIENT
Start: 2022-10-13 | End: 2022-11-16 | Stop reason: SDUPTHER

## 2022-10-13 RX ORDER — TOPIRAMATE 25 MG/1
25 TABLET ORAL 2 TIMES DAILY
Qty: 60 TABLET | Refills: 3 | Status: SHIPPED | OUTPATIENT
Start: 2022-10-13 | End: 2023-01-27

## 2022-10-13 RX ORDER — ONDANSETRON 8 MG/1
8 TABLET, ORALLY DISINTEGRATING ORAL EVERY 8 HOURS PRN
Qty: 90 TABLET | Refills: 3 | Status: SHIPPED | OUTPATIENT
Start: 2022-10-13 | End: 2022-11-16 | Stop reason: SDUPTHER

## 2022-10-13 RX ORDER — AMLODIPINE BESYLATE 5 MG/1
TABLET ORAL
COMMUNITY
Start: 2022-09-12 | End: 2023-03-30 | Stop reason: SDUPTHER

## 2022-10-13 NOTE — PATIENT INSTRUCTIONS
Start on topamax 25 mg twice a day     Get x-rays of feet    Get labwork fasting next month    Recheck in 4 weeks

## 2022-10-18 ENCOUNTER — TELEPHONE (OUTPATIENT)
Dept: FAMILY MEDICINE CLINIC | Facility: CLINIC | Age: 42
End: 2022-10-18

## 2022-10-18 NOTE — TELEPHONE ENCOUNTER
----- Message from Dru Vicente MD sent at 10/18/2022  9:26 AM CDT -----  Please let pt know x-ray of both feet shows osteoarthritis bilaterally with left worse than right. Recommend Podiatyr referral if pt agreeable I will order    Thanks

## 2022-10-19 DIAGNOSIS — M19.072 PRIMARY OSTEOARTHRITIS OF BOTH FEET: Primary | ICD-10-CM

## 2022-10-19 DIAGNOSIS — M79.671 BILATERAL FOOT PAIN: ICD-10-CM

## 2022-10-19 DIAGNOSIS — M79.672 BILATERAL FOOT PAIN: ICD-10-CM

## 2022-10-19 DIAGNOSIS — M19.071 PRIMARY OSTEOARTHRITIS OF BOTH FEET: Primary | ICD-10-CM

## 2022-10-21 ENCOUNTER — TELEPHONE (OUTPATIENT)
Dept: FAMILY MEDICINE CLINIC | Facility: CLINIC | Age: 42
End: 2022-10-21

## 2022-10-21 NOTE — TELEPHONE ENCOUNTER
This message is to inform you that the patient has not yet read the following message. (Notification date: October 19, 2022)     Results    From   Tim Clark MA To   Michel Harmon Sent and Delivered   10/17/2022 11:28 AM       Dr. Vicente wanted to inform you of the following:     x-ray of right forearm normal     Proceed with MRI of forearm/arm at imaging center        [address]    MyChart User Last Read On   Michel Harmon Not Read           Mailed letter with results.

## 2022-11-16 ENCOUNTER — OFFICE VISIT (OUTPATIENT)
Dept: FAMILY MEDICINE CLINIC | Facility: CLINIC | Age: 42
End: 2022-11-16

## 2022-11-16 VITALS
DIASTOLIC BLOOD PRESSURE: 80 MMHG | WEIGHT: 228.2 LBS | HEIGHT: 68 IN | BODY MASS INDEX: 34.59 KG/M2 | SYSTOLIC BLOOD PRESSURE: 132 MMHG | HEART RATE: 94 BPM | TEMPERATURE: 97.6 F | OXYGEN SATURATION: 97 %

## 2022-11-16 DIAGNOSIS — G43.809 OTHER MIGRAINE WITHOUT STATUS MIGRAINOSUS, NOT INTRACTABLE: ICD-10-CM

## 2022-11-16 DIAGNOSIS — M79.672 BILATERAL FOOT PAIN: ICD-10-CM

## 2022-11-16 DIAGNOSIS — M79.671 BILATERAL FOOT PAIN: ICD-10-CM

## 2022-11-16 DIAGNOSIS — K50.019 CROHN'S DISEASE OF SMALL INTESTINE WITH COMPLICATION: ICD-10-CM

## 2022-11-16 DIAGNOSIS — R94.6 ABNORMAL RESULTS OF THYROID FUNCTION STUDIES: ICD-10-CM

## 2022-11-16 DIAGNOSIS — N18.31 STAGE 3A CHRONIC KIDNEY DISEASE: ICD-10-CM

## 2022-11-16 DIAGNOSIS — Z72.0 TOBACCO USER: ICD-10-CM

## 2022-11-16 DIAGNOSIS — M79.631 RIGHT FOREARM PAIN: Primary | ICD-10-CM

## 2022-11-16 DIAGNOSIS — E66.9 CLASS 1 OBESITY WITH SERIOUS COMORBIDITY AND BODY MASS INDEX (BMI) OF 34.0 TO 34.9 IN ADULT, UNSPECIFIED OBESITY TYPE: ICD-10-CM

## 2022-11-16 DIAGNOSIS — M19.071 PRIMARY OSTEOARTHRITIS OF BOTH FEET: ICD-10-CM

## 2022-11-16 DIAGNOSIS — I10 ESSENTIAL HYPERTENSION: ICD-10-CM

## 2022-11-16 DIAGNOSIS — M19.072 PRIMARY OSTEOARTHRITIS OF BOTH FEET: ICD-10-CM

## 2022-11-16 PROCEDURE — 99214 OFFICE O/P EST MOD 30 MIN: CPT | Performed by: FAMILY MEDICINE

## 2022-11-16 RX ORDER — RIZATRIPTAN BENZOATE 10 MG/1
10 TABLET ORAL ONCE AS NEEDED
Qty: 10 TABLET | Refills: 3 | Status: SHIPPED | OUTPATIENT
Start: 2022-11-16 | End: 2023-01-27 | Stop reason: SDUPTHER

## 2022-11-16 RX ORDER — MESALAMINE 1.2 G/1
4800 TABLET, DELAYED RELEASE ORAL 3 TIMES DAILY
Qty: 120 TABLET | Refills: 3 | Status: SHIPPED | OUTPATIENT
Start: 2022-11-16 | End: 2023-01-10 | Stop reason: DRUGHIGH

## 2022-11-16 RX ORDER — ESOMEPRAZOLE MAGNESIUM 40 MG/1
40 CAPSULE, DELAYED RELEASE ORAL
Qty: 30 CAPSULE | Refills: 3 | Status: SHIPPED | OUTPATIENT
Start: 2022-11-16

## 2022-11-16 RX ORDER — ONDANSETRON 8 MG/1
8 TABLET, ORALLY DISINTEGRATING ORAL EVERY 8 HOURS PRN
Qty: 90 TABLET | Refills: 3 | Status: SHIPPED | OUTPATIENT
Start: 2022-11-16 | End: 2023-01-27 | Stop reason: SDUPTHER

## 2022-11-16 NOTE — PATIENT INSTRUCTIONS
Please get labwork soon     Will refer to Lyric CHRISTIANSON with Orthopedic    Will reorder MRI of arm/forearm in [place]    Recheck in 2 months

## 2022-11-22 ENCOUNTER — OFFICE VISIT (OUTPATIENT)
Dept: PODIATRY | Facility: CLINIC | Age: 42
End: 2022-11-22

## 2022-11-22 VITALS — WEIGHT: 228 LBS | BODY MASS INDEX: 34.56 KG/M2 | OXYGEN SATURATION: 99 % | HEIGHT: 68 IN | HEART RATE: 80 BPM

## 2022-11-22 DIAGNOSIS — M79.672 FOOT PAIN, BILATERAL: Primary | ICD-10-CM

## 2022-11-22 DIAGNOSIS — M72.2 PLANTAR FASCIITIS: ICD-10-CM

## 2022-11-22 DIAGNOSIS — M79.671 FOOT PAIN, BILATERAL: Primary | ICD-10-CM

## 2022-11-22 PROCEDURE — 99203 OFFICE O/P NEW LOW 30 MIN: CPT | Performed by: PODIATRIST

## 2022-11-22 NOTE — PROGRESS NOTES
Michel Harmon  1980  41 y.o. male      11/22/2022    Chief Complaint   Patient presents with   • Left Foot - Pain   • Right Foot - Pain       History of Present Illness    Michel Harmon is a 41 y.o.male came to today for bilateral foot pain. Patient states his left heel is worse than the right, pain has been present for about 6 months. Xrays on 10/13/2022.       Past Medical History:   Diagnosis Date   • Anxiety 2016   • Asthma 2013    Sleep Apnea   • Cholelithiasis 2013   • Colon polyp 2018   • Crohn's disease (HCC)    • Erectile dysfunction 2014   • GERD (gastroesophageal reflux disease)    • Headache 2016   • Irritable bowel syndrome 2016   • Kidney stone 2018   • Pancreatitis 2017   • Renal insufficiency 2022    3C   • Sleep apnea    • Stroke (HCC) 2015    During ielloscectomy   • Visual impairment 1985    Glasses         Past Surgical History:   Procedure Laterality Date   • COLON SURGERY  2015   • COLONOSCOPY     • COLONOSCOPY N/A 10/07/2020    Procedure: COLONOSCOPY--look TI;  Surgeon: Wisam Carpio MD;  Location: [facility] ENDOSCOPY;  Service: Gastroenterology;  Laterality: N/A;   • ENDOSCOPY N/A 10/07/2020    Procedure: ESOPHAGOGASTRODUODENOSCOPY--bx;  Surgeon: Wisam Carpio MD;  Location: [facility] ENDOSCOPY;  Service: Gastroenterology;  Laterality: N/A;   • HERNIA REPAIR  1981   • UPPER GASTROINTESTINAL ENDOSCOPY  10/07/2020         Family History   Problem Relation Age of Onset   • Breast cancer Other    • Brain cancer Other    • Ulcers Other    • Bleeding Disorder Other    • Anxiety disorder Sister    • Mental illness Sister         Involuntary commitment mid 90s   • Cancer Maternal Aunt         Breast cancer then brain cancer       No Known Allergies    Social History     Socioeconomic History   • Marital status:    Tobacco Use   • Smoking status: Every Day     Packs/day: 1.00     Years: 26.00     Pack years: 26.00     Types: Cigarettes     Start date: 1/1/1996   • Smokeless  tobacco: Never   Vaping Use   • Vaping Use: Never used   Substance and Sexual Activity   • Alcohol use: Not Currently   • Drug use: Never   • Sexual activity: Yes     Partners: Female     Birth control/protection: None     Comment: We're old         Current Outpatient Medications   Medication Sig Dispense Refill   • amLODIPine (NORVASC) 5 MG tablet      • Aspirin-Salicylamide-Caffeine (BC HEADACHE POWDER PO) Take  by mouth 2 (Two) Times a Day As Needed.     • cyclobenzaprine (FLEXERIL) 5 MG tablet Take 5 mg by mouth 3 (Three) Times a Day As Needed.     • esomeprazole (nexIUM) 40 MG capsule Take 1 capsule by mouth Every Morning Before Breakfast. 30 capsule 3   • gabapentin (NEURONTIN) 300 MG capsule Take 300 mg by mouth 3 (Three) Times a Day.     • glycopyrrolate (ROBINUL) 2 MG tablet TAKE 1 TABLET TWICE DAILY. 60 tablet 0   • melatonin 5 MG tablet tablet Take 5 mg by mouth As Needed.     • mesalamine (LIALDA) 1.2 g EC tablet Take 4 tablets by mouth 3 (Three) Times a Day. 120 tablet 3   • ondansetron ODT (Zofran ODT) 8 MG disintegrating tablet Place 1 tablet on the tongue Every 8 (Eight) Hours As Needed for Nausea or Vomiting. 90 tablet 3   • oxyCODONE-acetaminophen (PERCOCET)  MG per tablet Take 1 tablet by mouth 3 (Three) Times a Day.     • rizatriptan (MAXALT) 10 MG tablet Take 1 tablet by mouth 1 (One) Time As Needed for Migraine for up to 1 dose. 10 tablet 3   • tiZANidine (ZANAFLEX) 4 MG tablet Take 4 mg by mouth 2 (Two) Times a Day.     • topiramate (Topamax) 25 MG tablet Take 1 tablet by mouth 2 (Two) Times a Day. 60 tablet 3   • vitamin D (ERGOCALCIFEROL) 1.25 MG (58318 UT) capsule capsule Take 1 capsule by mouth Every 7 (Seven) Days. 5 capsule 3     No current facility-administered medications for this visit.       Review of Systems   Constitutional: Negative.    HENT: Negative.    Eyes: Negative.    Respiratory: Negative.    Cardiovascular: Negative.    Gastrointestinal: Negative.    Endocrine:  "Negative.    Genitourinary: Negative.    Musculoskeletal:        Foot pain   Skin: Negative.    Allergic/Immunologic: Negative.    Neurological: Negative.    Hematological: Negative.    Psychiatric/Behavioral: Negative.          OBJECTIVE    Pulse 80   Ht 172.7 cm (68\")   Wt 103 kg (228 lb)   SpO2 99%   BMI 34.67 kg/m²     Physical Exam  Vitals reviewed.   Constitutional:       General: He is not in acute distress.     Appearance: He is well-developed.   HENT:      Head: Normocephalic and atraumatic.      Nose: Nose normal.   Eyes:      Conjunctiva/sclera: Conjunctivae normal.      Pupils: Pupils are equal, round, and reactive to light.   Cardiovascular:      Pulses:           Dorsalis pedis pulses are 2+ on the right side and 2+ on the left side.        Posterior tibial pulses are 2+ on the right side and 2+ on the left side.   Pulmonary:      Effort: Pulmonary effort is normal. No respiratory distress.      Breath sounds: No wheezing.   Feet:      Right foot:      Skin integrity: Skin integrity normal.      Left foot:      Skin integrity: Skin integrity normal.      Comments: Pain on palpation to the medial tubercle bilateral calcaneus.  Negative lateral squeeze test.  Skin:     General: Skin is warm and dry.      Capillary Refill: Capillary refill takes less than 2 seconds.   Neurological:      Mental Status: He is alert and oriented to person, place, and time.   Psychiatric:         Behavior: Behavior normal.         Thought Content: Thought content normal.                Procedures        ASSESSMENT AND PLAN    Diagnoses and all orders for this visit:    1. Foot pain, bilateral (Primary)    2. Plantar fasciitis        - Comprehensive foot and ankle exam performed  - X-rays taken and reviewed.  No acute osseous or articular abnormalities.  - Patient advised to stretch, ice and to make appropriate shoe gear changes to include wearing athletic type shoes with supportive insoles. Patient was given written " instructions on how to correctly perform the stretching of the Achilles tendon/calf stretches, and the heel spur/plantar fasciitis regimen. Limit bare foot walking.    - Recommended over-the-counter insole such as power steps, spenco or walk fit  to properly support the arch in order to alleviate the tension and stress on the plantar fascia associated with normal daily walking. Patient was educated on the break-in period for new arch supports.  - All questions were answered to the patient's satisfaction.  - RTC in 6-8 weeks            This document has been electronically signed by Sukumar Chapa DPM on November 22, 2022 12:57 CST     11/22/2022  12:57 CST

## 2022-12-05 ENCOUNTER — HOSPITAL ENCOUNTER (OUTPATIENT)
Dept: MRI IMAGING | Facility: HOSPITAL | Age: 42
Discharge: HOME | End: 2022-12-05
Admitting: FAMILY MEDICINE

## 2022-12-05 DIAGNOSIS — M79.631 RIGHT FOREARM PAIN: ICD-10-CM

## 2022-12-05 DIAGNOSIS — M79.631 RIGHT FOREARM PAIN: Primary | ICD-10-CM

## 2022-12-05 PROCEDURE — 73218 MRI UPPER EXTREMITY W/O DYE: CPT

## 2022-12-16 RX ORDER — OMEPRAZOLE 40 MG/1
CAPSULE, DELAYED RELEASE ORAL
Qty: 30 CAPSULE | Refills: 0 | Status: SHIPPED | OUTPATIENT
Start: 2022-12-16 | End: 2023-01-10

## 2022-12-19 RX ORDER — OMEPRAZOLE 40 MG/1
CAPSULE, DELAYED RELEASE ORAL
Qty: 30 CAPSULE | Refills: 0 | OUTPATIENT
Start: 2022-12-19

## 2022-12-19 RX ORDER — ESOMEPRAZOLE MAGNESIUM 40 MG/1
CAPSULE, DELAYED RELEASE ORAL
Qty: 30 CAPSULE | Refills: 0 | OUTPATIENT
Start: 2022-12-19

## 2023-01-07 NOTE — PROGRESS NOTES
Subjective:  Michel Harmon is a 42 y.o. male who presents for       Patient Active Problem List   Diagnosis   • Crohn's disease of small intestine with complication (HCC)   • Hiatal hernia   • Long-term use of immunosuppressant medication   • Obstructive sleep apnea syndrome   • Pancreatitis   • Perforation of small intestine (HCC)   • Tobacco dependence   • Chronic migraine   • Obesity (BMI 30-39.9)   • Nausea and vomiting   • Gastroesophageal reflux disease with esophagitis   • Diarrhea   • Migraine   • High risk medication use   • Stage 3a chronic kidney disease (HCC)   • Elevated blood pressure reading   • Right lower quadrant abdominal pain   • Tobacco user   • Primary osteoarthritis of both feet   • Bilateral foot pain   • Abnormal results of thyroid function studies   • Right forearm pain   • Essential hypertension   • Class 1 obesity with serious comorbidity and body mass index (BMI) of 34.0 to 34.9 in adult   • Partial tear of common extensor tendon of elbow           Current Outpatient Medications:   •  amLODIPine (NORVASC) 5 MG tablet, , Disp: , Rfl:   •  Aspirin-Salicylamide-Caffeine (BC HEADACHE POWDER PO), Take  by mouth 2 (Two) Times a Day As Needed., Disp: , Rfl:   •  cyclobenzaprine (FLEXERIL) 5 MG tablet, Take 5 mg by mouth 3 (Three) Times a Day As Needed., Disp: , Rfl:   •  esomeprazole (nexIUM) 40 MG capsule, Take 1 capsule by mouth Every Morning Before Breakfast., Disp: 30 capsule, Rfl: 3  •  gabapentin (NEURONTIN) 400 MG capsule, , Disp: , Rfl:   •  glycopyrrolate (ROBINUL) 2 MG tablet, Take 1 tablet by mouth 2 (Two) Times a Day., Disp: 60 tablet, Rfl: 2  •  melatonin 5 MG tablet tablet, Take 5 mg by mouth As Needed., Disp: , Rfl:   •  ondansetron ODT (Zofran ODT) 8 MG disintegrating tablet, Place 1 tablet on the tongue Every 8 (Eight) Hours As Needed for Nausea or Vomiting., Disp: 90 tablet, Rfl: 3  •  oxyCODONE-acetaminophen (PERCOCET)  MG per tablet, Take 1 tablet by mouth 3  (Three) Times a Day., Disp: , Rfl:   •  tiZANidine (ZANAFLEX) 4 MG tablet, Take 4 mg by mouth 2 (Two) Times a Day., Disp: , Rfl:   •  vitamin D (ERGOCALCIFEROL) 1.25 MG (83086 UT) capsule capsule, Take 1 capsule by mouth Every 7 (Seven) Days., Disp: 5 capsule, Rfl: 3  •  nicotine (Nicotrol) 10 MG inhaler, Inhale 1 puff As Needed for Smoking Cessation. Can use up 6 times a day, Disp: 1683 each, Rfl: 3  •  ubrogepant 100 MG tablet, Take 1 tablet by mouth 1 (One) Time for 1 dose., Disp: 30 tablet, Rfl: 3    Pt is 42 yo male with management of obesity, crohn's disease, GERD with esophagitis. History of pancreatitis , insomnia, migrained headaches. Obesity history of perforation of small intestines, Nausea/vomiting, hiatal hernia, KEESHA, tobacco user, immunocopromised, high risk medication use, sp ileum surgery., HLP .      10/13/22 in office visit for recheck. Pt had labwork done on 8/5/22 that showed vitamin B12 high on thyroid studies TSH normal T4 low at 0.85. LDL at 120. hga1c stable. On CMP CR at 1.39 and GFR at 64.6. CBC Showed stable hemoglobin and WBC. BP is stable  Pt states that he was started on amlodipine 5 mg daily and now he has swelling and pain in both feet for two months . 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 It hurts on both medial portion of bottom of both feet. This has started hurting before he took norvasc. He denies any trauma. He does wear steel toes boots. He also continues to smoke about 3/4 ppd he does not want to quit.  His Crohn's symptoms is stable. He also has pain in right arm mainly in right antecubital space he picked up something heavy.      11/16/22 in office visit for recheck . Pt has yet to get labwork ordered on 10/13/22. Pt had x-ray of forearm on 10/13/22 that was normal. X-ray of both feet on 10/13/22 showed osteoarthritis of both feet with left slightly worse than right. PT also has right calcaneal achilles spur. Pt was referred to Podiatry and has upcoming appt on  11/22/22. Pt continues to have pain in right antecubital space/right forearm. He was ordered MRI of radius/ulna but he would have to pay $2100.  He cannot  anything heavy. It is affecting his job for about 6 months. He is able to write his right hand.     1/27/23 in office visit for recheck. Pt saw Podiatry on 11/22/22 for his bilateral foot pain and plantar fascitis,  He was recommend OTC insole and stretching exercises.  He also saw GI on 1/20/23 for his Crohn's disease. Pt has problabl chronic ileitis,  He will be recheck in 6 months. He has yet to get labwork ordered on 10/13/22. Pt did have MRI of radius/ulna on 12/5/22 that showed strain vs healing partial tear of the common extensor tendon without tear.  Pt was referred to Orthopedic. BP is stable.   He continues to take  maxalt for his headaches. He averages about 2 headaches a month. They are located frontal.  He does have sensitivity to light       Nicotine Dependence  Presents for follow-up visit. Symptoms include fatigue. Symptoms are negative for sore throat. The symptoms have been stable. He smokes 1 pack of cigarettes per day. Compliance with prior treatments has been variable.   Migraine  Headache pattern:  Headache sometimes there, sometimes not at all  Initial event:  None  Recent changes:  Headaches last longer than they used to  Frequency:  1 to 3 per month  Providers seen:  None  Lifetime total:  20+  Number of ER visits for headache:  0  Time of day symptoms are worse:  No specific time of day  Do headaches wake patient from sleep?: No    Season symptoms are worse:  No particular season  Laterality:  Unable to describe  Aggravating factors:  Light  Changes in thinking and mood:  Fatigue  Abortive medication frequency of use:  Less than 1 day a week  Abortive medications tried:  Rizatriptan  Vitamins and supplements tried:  None  Arm Pain   The incident occurred more than 6 months   ago. The incident occurred at home. The injury mechanism  is unknown (lifting heavy object ). The pain is present in the right forearm (right antecubital space ). The quality of the pain is described as aching. The pain does not radiate. The pain is at a severity of 8/10. The pain is moderate. Associated symptoms include numbness. Pertinent negatives include no chest pain. The symptoms are aggravated by movement and lifting. And turning his right arm inward  He has tried nothing for the symptoms. The treatment provided no relief.   Abdominal Pain  This is a chronic problem. The current episode started more than 1 month ago. The onset quality is sudden. The problem occurs constantly. The problem has been unchanged. The pain is located in the RLQ. The pain is at a severity of 4/10. The pain is moderate. The quality of the pain is aching. The abdominal pain radiates to the RLQ. Associated symptoms include arthralgias and headaches. Pertinent negatives include no anorexia, belching, constipation, diarrhea, dysuria, fever, flatus, frequency, hematochezia, hematuria, melena, myalgias, nausea or vomiting. Nothing aggravates the pain. The pain is relieved by nothing. His past medical history is significant for Crohn's disease. There is no history of abdominal surgery, colon cancer, gallstones, GERD, irritable bowel syndrome, pancreatitis, PUD or ulcerative colitis.   GI Problem  The primary symptoms include fatigue, abdominal pain and arthralgias. Primary symptoms do not include fever, nausea, vomiting or diarrhea.   Obesity  This is a chronic problem. The current episode started more than 1 year ago. The problem occurs constantly. Associated symptoms include abdominal pain, arthralgias, fatigue, headaches, numbness and weakness. Pertinent negatives include no chest pain, chills, congestion, coughing, diaphoresis, fever, nausea, sore throat or vomiting. Nothing aggravates the symptoms. He has tried nothing for the symptoms. The treatment provided no relief      Review of  Systems  Review of Systems   Constitutional: Positive for activity change and fatigue. Negative for appetite change, chills, diaphoresis and fever.   HENT: Negative for congestion, postnasal drip, rhinorrhea, sinus pressure, sinus pain, sneezing, sore throat, trouble swallowing and voice change.    Respiratory: Negative for cough, choking, chest tightness, shortness of breath, wheezing and stridor.    Cardiovascular: Negative for chest pain.   Gastrointestinal: Positive for abdominal pain. Negative for diarrhea, nausea and vomiting.   Musculoskeletal: Positive for arthralgias.   Neurological: Positive for weakness and headaches.       Patient Active Problem List   Diagnosis   • Crohn's disease of small intestine with complication (HCC)   • Hiatal hernia   • Long-term use of immunosuppressant medication   • Obstructive sleep apnea syndrome   • Pancreatitis   • Perforation of small intestine (HCC)   • Tobacco dependence   • Chronic migraine   • Obesity (BMI 30-39.9)   • Nausea and vomiting   • Gastroesophageal reflux disease with esophagitis   • Diarrhea   • Migraine   • High risk medication use   • Stage 3a chronic kidney disease (HCC)   • Elevated blood pressure reading   • Right lower quadrant abdominal pain   • Tobacco user   • Primary osteoarthritis of both feet   • Bilateral foot pain   • Abnormal results of thyroid function studies   • Right forearm pain   • Essential hypertension   • Class 1 obesity with serious comorbidity and body mass index (BMI) of 34.0 to 34.9 in adult   • Partial tear of common extensor tendon of elbow     Past Surgical History:   Procedure Laterality Date   • COLON SURGERY  2015   • COLONOSCOPY     • COLONOSCOPY N/A 10/07/2020    Procedure: COLONOSCOPY--look TI;  Surgeon: Wisam Carpio MD;  Location: [facility] ENDOSCOPY;  Service: Gastroenterology;  Laterality: N/A;   • ENDOSCOPY N/A 10/07/2020    Procedure: ESOPHAGOGASTRODUODENOSCOPY--bx;  Surgeon: Wisam Carpio MD;  Location:   [facility] ENDOSCOPY;  Service: Gastroenterology;  Laterality: N/A;   • HERNIA REPAIR  1981   • UPPER GASTROINTESTINAL ENDOSCOPY  10/07/2020     Social History     Socioeconomic History   • Marital status:    Tobacco Use   • Smoking status: Every Day     Packs/day: 1.00     Years: 26.00     Pack years: 26.00     Types: Cigarettes     Start date: 1/1/1996   • Smokeless tobacco: Never   Vaping Use   • Vaping Use: Never used   Substance and Sexual Activity   • Alcohol use: Not Currently   • Drug use: Never   • Sexual activity: Yes     Partners: Female     Birth control/protection: None     Comment: We're old     Family History   Problem Relation Age of Onset   • Breast cancer Other    • Brain cancer Other    • Ulcers Other    • Bleeding Disorder Other    • Anxiety disorder Sister    • Mental illness Sister         Involuntary commitment mid 90s   • Cancer Maternal Aunt         Breast cancer then brain cancer     Lab on 08/05/2022   Component Date Value Ref Range Status   • WBC 08/05/2022 8.72  3.40 - 10.80 10*3/mm3 Final   • RBC 08/05/2022 5.03  4.14 - 5.80 10*6/mm3 Final   • Hemoglobin 08/05/2022 16.1  13.0 - 17.7 g/dL Final   • Hematocrit 08/05/2022 46.3  37.5 - 51.0 % Final   • MCV 08/05/2022 92.0  79.0 - 97.0 fL Final   • MCH 08/05/2022 32.0  26.6 - 33.0 pg Final   • MCHC 08/05/2022 34.8  31.5 - 35.7 g/dL Final   • RDW 08/05/2022 13.5  12.3 - 15.4 % Final   • RDW-SD 08/05/2022 45.5  37.0 - 54.0 fl Final   • MPV 08/05/2022 10.3  6.0 - 12.0 fL Final   • Platelets 08/05/2022 251  140 - 450 10*3/mm3 Final   • Neutrophil % 08/05/2022 56.6  42.7 - 76.0 % Final   • Lymphocyte % 08/05/2022 29.7  19.6 - 45.3 % Final   • Monocyte % 08/05/2022 7.8  5.0 - 12.0 % Final   • Eosinophil % 08/05/2022 4.7  0.3 - 6.2 % Final   • Basophil % 08/05/2022 0.9  0.0 - 1.5 % Final   • Immature Grans % 08/05/2022 0.3  0.0 - 0.5 % Final   • Neutrophils, Absolute 08/05/2022 4.93  1.70 - 7.00 10*3/mm3 Final   • Lymphocytes, Absolute 08/05/2022  2.59  0.70 - 3.10 10*3/mm3 Final   • Monocytes, Absolute 08/05/2022 0.68  0.10 - 0.90 10*3/mm3 Final   • Eosinophils, Absolute 08/05/2022 0.41 (H)  0.00 - 0.40 10*3/mm3 Final   • Basophils, Absolute 08/05/2022 0.08  0.00 - 0.20 10*3/mm3 Final   • Immature Grans, Absolute 08/05/2022 0.03  0.00 - 0.05 10*3/mm3 Final   • nRBC 08/05/2022 0.0  0.0 - 0.2 /100 WBC Final   • Glucose 08/05/2022 100 (H)  65 - 99 mg/dL Final   • BUN 08/05/2022 17  6 - 20 mg/dL Final   • Creatinine 08/05/2022 1.39 (H)  0.76 - 1.27 mg/dL Final   • Sodium 08/05/2022 142  136 - 145 mmol/L Final   • Potassium 08/05/2022 4.3  3.5 - 5.2 mmol/L Final   • Chloride 08/05/2022 103  98 - 107 mmol/L Final   • CO2 08/05/2022 24.2  22.0 - 29.0 mmol/L Final   • Calcium 08/05/2022 8.8  8.6 - 10.5 mg/dL Final   • Total Protein 08/05/2022 6.6  6.0 - 8.5 g/dL Final   • Albumin 08/05/2022 4.60  3.50 - 5.20 g/dL Final   • ALT (SGPT) 08/05/2022 30  1 - 41 U/L Final   • AST (SGOT) 08/05/2022 21  1 - 40 U/L Final   • Alkaline Phosphatase 08/05/2022 70  39 - 117 U/L Final   • Total Bilirubin 08/05/2022 0.4  0.0 - 1.2 mg/dL Final   • Globulin 08/05/2022 2.0  gm/dL Final   • A/G Ratio 08/05/2022 2.3  g/dL Final   • BUN/Creatinine Ratio 08/05/2022 12.2  7.0 - 25.0 Final   • Anion Gap 08/05/2022 14.8  5.0 - 15.0 mmol/L Final   • eGFR 08/05/2022 64.5  >60.0 mL/min/1.73 Final    National Kidney Foundation and American Society of Nephrology (ASN) Task Force recommended calculation based on the Chronic Kidney Disease Epidemiology Collaboration (CKD-EPI) equation refit without adjustment for race.   • Hemoglobin A1C 08/05/2022 5.40  4.80 - 5.60 % Final   • Total Cholesterol 08/05/2022 188  0 - 200 mg/dL Final   • Triglycerides 08/05/2022 141  0 - 150 mg/dL Final   • HDL Cholesterol 08/05/2022 43  40 - 60 mg/dL Final   • LDL Cholesterol  08/05/2022 120 (H)  0 - 100 mg/dL Final   • VLDL Cholesterol 08/05/2022 25  5 - 40 mg/dL Final   • LDL/HDL Ratio 08/05/2022 2.72   Final   •  TSH 08/05/2022 2.150  0.270 - 4.200 uIU/mL Final   • Free T4 08/05/2022 0.85 (L)  0.93 - 1.70 ng/dL Final   • 25 Hydroxy, Vitamin D 08/05/2022 42.7  30.0 - 100.0 ng/ml Final   • Vitamin B-12 08/05/2022 >2,000 (H)  211 - 946 pg/mL Final   • Hepatitis C Ab 08/05/2022 Non-Reactive  Non-Reactive Final   • Urine Culture 08/05/2022 No growth   Final   • Color, UA 08/05/2022 Yellow  Yellow, Straw Final   • Appearance, UA 08/05/2022 Turbid (A)  Clear Final   • pH, UA 08/05/2022 5.5  5.0 - 8.0 Final   • Specific Gravity, UA 08/05/2022 1.029  1.005 - 1.030 Final   • Glucose, UA 08/05/2022 Negative  Negative Final   • Ketones, UA 08/05/2022 Negative  Negative Final   • Bilirubin, UA 08/05/2022 Negative  Negative Final   • Blood, UA 08/05/2022 Negative  Negative Final   • Protein, UA 08/05/2022 Negative  Negative Final   • Leuk Esterase, UA 08/05/2022 Negative  Negative Final   • Nitrite, UA 08/05/2022 Negative  Negative Final   • Urobilinogen, UA 08/05/2022 0.2 E.U./dL  0.2 - 1.0 E.U./dL Final   • RBC, UA 08/05/2022 0-2  None Seen, 0-2 /HPF Final   • WBC, UA 08/05/2022 0-2  None Seen, 0-2 /HPF Final   • Bacteria, UA 08/05/2022 None Seen  None Seen /HPF Final   • Squamous Epithelial Cells, UA 08/05/2022 0-2  None Seen, 0-2 /HPF Final   • Hyaline Casts, UA 08/05/2022 None Seen  None Seen /LPF Final   • Methodology 08/05/2022 Automated Microscopy   Final      MRI Radius Ulna Right Without Contrast  Narrative: EXAM: MR UPPER EXTREMITY WITHOUT IV CONTRAST    HISTORY: 41-year-old male with right upper extremity/forearm  pain. Right-sided elbow pain, fall, pain in side of elbow 6  months prior    COMPARISONS: Radiograph 10/13/2022    TECHNIQUE: Multiplanar, multi sequence MR images were obtained of  the right elbow without the use of intravenous contrast.    FINDINGS:  Joint effusion: Trace joint effusion.    Medial Compartment:  Ulnar collateral ligament is intact. Common flexor tendon is  intact. Medial epicondyle is intact without  "fracture or edema.    Lateral Compartment:  Radial collateral ligament is intact. Lateral ulnar collateral  ligament is intact.  Common extensor tendon grossly intact with  very small amount of thin curvilinear increased T2 signal at its  lateral epicondylar insertion. Lateral epicondyle is intact  without fracture or edema.    Posterior Compartment:  Triceps is intact. Olecranon is intact without fracture or edema.  No bursitis.    Anterior compartment:  Biceps is intact. Brachialis is intact. No bicipitoradial  bursitis.    Articulations:  Radio-capitellar joint is normal. Ulno-humeral joint is normal.  Proximal radio-ulnar joint is normal.    Miscellaneous:  Bone marrow signal is normal. Muscles are normal in bulk and  signal. Neurovascular bundles are within normal limits. Cubital  tunnel is normal. Retinaculum is intact.  Impression: Strain versus healing partial tear of the common extensor tendon  without tear.    Trace elbow joint effusion.    Electronically signed by:  Viviana Doll MD  12/5/2022  3:55 PM [facility] Workstation: [id number]    @[employer]@  Immunization History   Administered Date(s) Administered   • FluLaval/Fluzone >6mos 11/13/2018   • Fluzone Quad >6mos (Multi-dose) 11/10/2017   • Pneumococcal Conjugate 13-Valent (PCV13) 07/17/2018   • Pneumococcal Polysaccharide (PPSV23) 05/11/2016, 01/09/2017       The following portions of the patient's history were reviewed and updated as appropriate: allergies, current medications, past family history, past medical history, past social history, past surgical history and problem list.        Physical Exam  /82 (BP Location: Left arm, Patient Position: Sitting, Cuff Size: Adult)   Pulse 88   Temp 98 °F (36.7 °C)   Ht 172.7 cm (68\")   Wt 103 kg (227 lb 3.2 oz)   SpO2 98%   BMI 34.55 kg/m²     /82 (BP Location: Left arm, Patient Position: Sitting, Cuff Size: Adult)   Pulse 88   Temp 98 °F (36.7 °C)   Ht 172.7 cm (68\")   Wt 103 kg " (227 lb 3.2 oz)   SpO2 98%   BMI 34.55 kg/m²     Physical Exam  Vitals and nursing note reviewed.   Constitutional:       Appearance: He is well-developed. He is not diaphoretic.   HENT:      Head: Normocephalic and atraumatic.      Right Ear: External ear normal.   Eyes:      Conjunctiva/sclera: Conjunctivae normal.      Pupils: Pupils are equal, round, and reactive to light.   Cardiovascular:      Rate and Rhythm: Normal rate and regular rhythm.      Heart sounds: Normal heart sounds. No murmur heard.  Pulmonary:      Effort: Pulmonary effort is normal. No respiratory distress.      Breath sounds: Normal breath sounds.   Abdominal:      General: Bowel sounds are normal. There is no distension.      Palpations: Abdomen is soft.      Tenderness: There is abdominal tenderness.      Comments: Obese abdomen    Musculoskeletal:         General: Tenderness present. No deformity.      Right forearm: Tenderness and bony tenderness present.      Cervical back: Normal range of motion and neck supple.      Right foot: Decreased range of motion. Tenderness and bony tenderness present.      Left foot: Decreased range of motion. Tenderness present.      Comments: Right antecubital pain medially. Tenderness on palpation    Skin:     General: Skin is warm.      Coloration: Skin is not pale.      Findings: No erythema or rash.   Neurological:      Mental Status: He is alert and oriented to person, place, and time.      Cranial Nerves: No cranial nerve deficit.   Psychiatric:         Behavior: Behavior normal.         [unfilled]   Diagnosis Plan   1. Class 1 obesity with serious comorbidity and body mass index (BMI) of 34.0 to 34.9 in adult, unspecified obesity type        2. Long-term use of immunosuppressant medication        3. Essential hypertension        4. Crohn's disease of small intestine with complication (HCC)        5. Gastroesophageal reflux disease with esophagitis without hemorrhage        6. Stage 3a chronic  kidney disease (HCC)        7. Tobacco user        8. Other migraine without status migrainosus, not intractable        9. Right forearm pain        10. Partial tear of common extensor tendon of elbow        11. Abnormal results of thyroid function studies                -recommend labwork   -recommend pneumonia vaccination  -recommend COVID-19 vaccination  -recommend influenza vaccination   -right foream pain/antecubital pain - x-ray of right forearm normal . MRI of radius ulnar showed partial tear vs strain of common extensor tendon   -abnormal thyroid function - repeat studies   -bilateral foot pain/osteoarthritis of both feet - reviewed x-ray has achilles bone spur. Referred to Podiatry   -HTN - stable on norvasc 5 mg daily.   -HLP - recommend heart health ydiet   -N/V - zofran  8mg PO every 8 hours PRN    -high risk medication use / pain on right side -  PM following on neurontin and Percocet   -CKD stage 3a- Nephrology following   -Crohn's disease/GERD  - GI following was  on lialda 4800 mg PO qam on nexium 40 mg daily.   -migraine headaches - on maxalt PRN. Will stop maxalt and try on ubrelvy 100 mg as needed samples and drug information provided   -obesity - counseled weight loss >5 minutes BMI at 34.55   -tobacco user - counseled quit smoking >5 minutes recommend 1 800 QUIT NOW. Will start on nicotrol    -advised pt to be safe and call with questions and concerns  -advised pt to go to ER or call 911 if symptoms worrisome or severe  -advised pt to followup with specialist and referrals  -advised pt to be safe during COVID-19 pandemic  I spent 45  minutes caring for Michel on this date of service. This time includes time spent by me in the following activities: preparing for the visit, reviewing tests, obtaining and/or reviewing a separately obtained history, performing a medically appropriate examination and/or evaluation, counseling and educating the patient/family/caregiver, ordering medications, tests, or  procedures, referring and communicating with other health care professionals, documenting information in the medical record and independently interpreting results and communicating that information with the patient/family/caregiver  -recheck in 2 months         This document has been electronically signed by Dru Vicente MD on January 27, 2023 09:19 CST

## 2023-01-10 ENCOUNTER — OFFICE VISIT (OUTPATIENT)
Dept: GASTROENTEROLOGY | Facility: CLINIC | Age: 43
End: 2023-01-10
Payer: COMMERCIAL

## 2023-01-10 VITALS
DIASTOLIC BLOOD PRESSURE: 97 MMHG | SYSTOLIC BLOOD PRESSURE: 139 MMHG | HEIGHT: 68 IN | HEART RATE: 82 BPM | BODY MASS INDEX: 34.56 KG/M2 | WEIGHT: 228 LBS

## 2023-01-10 DIAGNOSIS — K21.00 GASTROESOPHAGEAL REFLUX DISEASE WITH ESOPHAGITIS WITHOUT HEMORRHAGE: ICD-10-CM

## 2023-01-10 DIAGNOSIS — R10.33 PERIUMBILICAL ABDOMINAL PAIN: ICD-10-CM

## 2023-01-10 DIAGNOSIS — K50.019 CROHN'S DISEASE OF SMALL INTESTINE WITH COMPLICATION: Primary | ICD-10-CM

## 2023-01-10 PROCEDURE — 99213 OFFICE O/P EST LOW 20 MIN: CPT | Performed by: PHYSICIAN ASSISTANT

## 2023-01-10 RX ORDER — GABAPENTIN 400 MG/1
CAPSULE ORAL
COMMUNITY
Start: 2022-12-28

## 2023-01-10 RX ORDER — GLYCOPYRROLATE 2 MG/1
TABLET ORAL
Qty: 60 TABLET | Refills: 0 | OUTPATIENT
Start: 2023-01-10

## 2023-01-10 RX ORDER — GLYCOPYRROLATE 2 MG/1
2 TABLET ORAL 2 TIMES DAILY
Qty: 60 TABLET | Refills: 2 | Status: SHIPPED | OUTPATIENT
Start: 2023-01-10

## 2023-01-10 RX ORDER — MESALAMINE 0.38 G/1
1500 CAPSULE, EXTENDED RELEASE ORAL DAILY
Qty: 120 CAPSULE | Refills: 2 | Status: SHIPPED | OUTPATIENT
Start: 2023-01-10 | End: 2023-01-27

## 2023-01-10 NOTE — PROGRESS NOTES
Chief Complaint   Patient presents with   • Heartburn       ENDO PROCEDURE ORDERED:    Subjective    Michel Harmon is a 42 y.o. male. he is here today for follow-up.    History of Present Illness    Patient is seen on a recheck of his Crohn's disease, GERD, abdominal pain. Last seen on 07/01/2022. He states he recently had bronchitis. He was sick for about a week. He had coughing so much that he vomited. He is still apparently taking his Lialda 4 a day. He is on Robinul as needed for IBS symptoms. Weight is up 3.1 pounds since last visit. He does take Nexium for the heartburn. Denied nausea or vomiting. Last EGD/colonoscopy on 10/07/2020 showed esophagitis, gastritis, ileitis.     Laboratory on 08/05/2022 showed normal CBC. CMP showed glucose 106, creatinine 1.39, GFR 64.5, otherwise normal. A1c of 5.4%. Cholesterol panel showed an LDL of 120. TSH was normal. T4 was low at 0.85. Normal vitamin D. B12 was greater than 2000. Hepatitis C antibody nonreactive. Urinalysis and urine culture were negative.     ASSESSMENT/PLAN:  Patient with probably chronic ileitis, significant GERD. Stable on current regimen. I refilled his medications. Last LFTs were normal. He does have chronic renal disease and is followed by his other care providers. We will plan followup in 6 months, sooner if needed. Further pending clinical course and the results of the above.      The following portions of the patient's history were reviewed and updated as appropriate:   Past Medical History:   Diagnosis Date   • Anxiety 2016   • Asthma 2013    Sleep Apnea   • Cholelithiasis 2013   • Colon polyp 2018   • Crohn's disease (HCC)    • Erectile dysfunction 2014   • GERD (gastroesophageal reflux disease)    • Headache 2016   • Irritable bowel syndrome 2016   • Kidney stone 2018   • Pancreatitis 2017   • Renal insufficiency 2022    3C   • Sleep apnea    • Stroke (HCC) 2015    During ielloscectomy   • Visual impairment 1985    Glasses     Past  Surgical History:   Procedure Laterality Date   • COLON SURGERY  2015   • COLONOSCOPY     • COLONOSCOPY N/A 10/07/2020    Procedure: COLONOSCOPY--look TI;  Surgeon: Wisam Carpio MD;  Location: [facility] ENDOSCOPY;  Service: Gastroenterology;  Laterality: N/A;   • ENDOSCOPY N/A 10/07/2020    Procedure: ESOPHAGOGASTRODUODENOSCOPY--bx;  Surgeon: Wisam Carpio MD;  Location: [facility] ENDOSCOPY;  Service: Gastroenterology;  Laterality: N/A;   • HERNIA REPAIR  1981   • UPPER GASTROINTESTINAL ENDOSCOPY  10/07/2020     Family History   Problem Relation Age of Onset   • Breast cancer Other    • Brain cancer Other    • Ulcers Other    • Bleeding Disorder Other    • Anxiety disorder Sister    • Mental illness Sister         Involuntary commitment mid 90s   • Cancer Maternal Aunt         Breast cancer then brain cancer       No Known Allergies  Social History     Socioeconomic History   • Marital status:    Tobacco Use   • Smoking status: Every Day     Packs/day: 1.00     Years: 26.00     Pack years: 26.00     Types: Cigarettes     Start date: 1/1/1996   • Smokeless tobacco: Never   Vaping Use   • Vaping Use: Never used   Substance and Sexual Activity   • Alcohol use: Not Currently   • Drug use: Never   • Sexual activity: Yes     Partners: Female     Birth control/protection: None     Comment: We're old     Current Medications:  Prior to Admission medications    Medication Sig Start Date End Date Taking? Authorizing Provider   amLODIPine (NORVASC) 5 MG tablet  9/12/22  Yes Sharon Verduzco MD   Aspirin-Salicylamide-Caffeine (BC HEADACHE POWDER PO) Take  by mouth 2 (Two) Times a Day As Needed.   Yes Sharon Verduzco MD   cyclobenzaprine (FLEXERIL) 5 MG tablet Take 5 mg by mouth 3 (Three) Times a Day As Needed. 3/18/22  Yes Sharon Verduzco MD   esomeprazole (nexIUM) 40 MG capsule Take 1 capsule by mouth Every Morning Before Breakfast. 11/16/22  Yes Dru Vicente MD   gabapentin (NEURONTIN) 400 MG  "capsule  12/28/22  Yes Sharon Verduzco MD   glycopyrrolate (ROBINUL) 2 MG tablet TAKE 1 TABLET TWICE DAILY. 6/20/22  Yes Dru Corrales PA-C   melatonin 5 MG tablet tablet Take 5 mg by mouth As Needed.   Yes Sharon Verduzco MD   mesalamine (LIALDA) 1.2 g EC tablet Take 4 tablets by mouth 3 (Three) Times a Day. 11/16/22  Yes Dru Vicente MD   omeprazole (priLOSEC) 40 MG capsule TAKE 1 CAPSULE DAILY. 12/16/22  Yes Dru Vicente MD   ondansetron ODT (Zofran ODT) 8 MG disintegrating tablet Place 1 tablet on the tongue Every 8 (Eight) Hours As Needed for Nausea or Vomiting. 11/16/22  Yes Dru Vicente MD   oxyCODONE-acetaminophen (PERCOCET)  MG per tablet Take 1 tablet by mouth 3 (Three) Times a Day. 8/6/20  Yes Sharon Verduzco MD   rizatriptan (MAXALT) 10 MG tablet Take 1 tablet by mouth 1 (One) Time As Needed for Migraine for up to 1 dose. 11/16/22  Yes Dru Vicente MD   tiZANidine (ZANAFLEX) 4 MG tablet Take 4 mg by mouth 2 (Two) Times a Day.   Yes Sharon Verduzco MD   topiramate (Topamax) 25 MG tablet Take 1 tablet by mouth 2 (Two) Times a Day. 10/13/22  Yes Dru Vicente MD   vitamin D (ERGOCALCIFEROL) 1.25 MG (81830 UT) capsule capsule Take 1 capsule by mouth Every 7 (Seven) Days. 6/23/22  Yes Dru Vicente MD   gabapentin (NEURONTIN) 300 MG capsule Take 300 mg by mouth 3 (Three) Times a Day.    Sharon Verduzco MD     Review of Systems  Review of Systems       Objective    /97 (BP Location: Left arm)   Pulse 82   Ht 172.7 cm (68\")   Wt 103 kg (228 lb)   BMI 34.67 kg/m²   Physical Exam  Vitals and nursing note reviewed.   Constitutional:       General: He is not in acute distress.     Appearance: He is well-developed.   HENT:      Head: Normocephalic and atraumatic.   Eyes:      Pupils: Pupils are equal, round, and reactive to light.   Cardiovascular:      Rate and Rhythm: Normal rate and regular rhythm.      Heart sounds: Normal heart sounds. "   Pulmonary:      Effort: Pulmonary effort is normal.      Breath sounds: Normal breath sounds.   Abdominal:      General: Bowel sounds are normal. There is no distension or abdominal bruit.      Palpations: Abdomen is soft. Abdomen is not rigid. There is no shifting dullness or mass.      Tenderness: There is abdominal tenderness. There is no guarding or rebound.      Hernia: No hernia is present. There is no hernia in the ventral area.   Musculoskeletal:         General: Normal range of motion.      Cervical back: Normal range of motion.   Skin:     General: Skin is warm and dry.   Neurological:      Mental Status: He is alert and oriented to person, place, and time.   Psychiatric:         Behavior: Behavior normal.         Thought Content: Thought content normal.         Judgment: Judgment normal.       Assessment & Plan      1. Crohn's disease of small intestine with complication (HCC)    2. Gastroesophageal reflux disease with esophagitis without hemorrhage    3. Periumbilical abdominal pain    .   Diagnoses and all orders for this visit:    1. Crohn's disease of small intestine with complication (HCC) (Primary)    2. Gastroesophageal reflux disease with esophagitis without hemorrhage    3. Periumbilical abdominal pain    Other orders  -     glycopyrrolate (ROBINUL) 2 MG tablet; Take 1 tablet by mouth 2 (Two) Times a Day.  Dispense: 60 tablet; Refill: 2  -     mesalamine (Apriso) 0.375 g 24 hr capsule; Take 4 capsules by mouth Daily.  Dispense: 120 capsule; Refill: 2        Orders placed during this encounter include:  No orders of the defined types were placed in this encounter.      Medications prescribed:  New Medications Ordered This Visit   Medications   • glycopyrrolate (ROBINUL) 2 MG tablet     Sig: Take 1 tablet by mouth 2 (Two) Times a Day.     Dispense:  60 tablet     Refill:  2   • mesalamine (Apriso) 0.375 g 24 hr capsule     Sig: Take 4 capsules by mouth Daily.     Dispense:  120 capsule      Refill:  2       Requested Prescriptions     Signed Prescriptions Disp Refills   • glycopyrrolate (ROBINUL) 2 MG tablet 60 tablet 2     Sig: Take 1 tablet by mouth 2 (Two) Times a Day.   • mesalamine (Apriso) 0.375 g 24 hr capsule 120 capsule 2     Sig: Take 4 capsules by mouth Daily.       Review and/or summary of lab tests, radiology, procedures, medications. Review and summary of old records and obtaining of history. The risks and benefits of my recommendations, as well as other treatment options were discussed with the patient today. Questions were answered.    Follow-up: Return in about 6 months (around 7/10/2023), or if symptoms worsen or fail to improve.     * Surgery not found *      This document has been electronically signed by Dru Corrales PA-C on January 23, 2023 19:39 CST      Results for orders placed or performed in visit on 08/05/22   Urinalysis, Microscopic Only - Urine, Clean Catch    Specimen: Urine, Clean Catch   Result Value Ref Range    RBC, UA 0-2 None Seen, 0-2 /HPF    WBC, UA 0-2 None Seen, 0-2 /HPF    Bacteria, UA None Seen None Seen /HPF    Squamous Epithelial Cells, UA 0-2 None Seen, 0-2 /HPF    Hyaline Casts, UA None Seen None Seen /LPF    Methodology Automated Microscopy    CBC Auto Differential    Specimen: Blood   Result Value Ref Range    WBC 8.72 3.40 - 10.80 10*3/mm3    RBC 5.03 4.14 - 5.80 10*6/mm3    Hemoglobin 16.1 13.0 - 17.7 g/dL    Hematocrit 46.3 37.5 - 51.0 %    MCV 92.0 79.0 - 97.0 fL    MCH 32.0 26.6 - 33.0 pg    MCHC 34.8 31.5 - 35.7 g/dL    RDW 13.5 12.3 - 15.4 %    RDW-SD 45.5 37.0 - 54.0 fl    MPV 10.3 6.0 - 12.0 fL    Platelets 251 140 - 450 10*3/mm3    Neutrophil % 56.6 42.7 - 76.0 %    Lymphocyte % 29.7 19.6 - 45.3 %    Monocyte % 7.8 5.0 - 12.0 %    Eosinophil % 4.7 0.3 - 6.2 %    Basophil % 0.9 0.0 - 1.5 %    Immature Grans % 0.3 0.0 - 0.5 %    Neutrophils, Absolute 4.93 1.70 - 7.00 10*3/mm3    Lymphocytes, Absolute 2.59 0.70 - 3.10 10*3/mm3    Monocytes,  Absolute 0.68 0.10 - 0.90 10*3/mm3    Eosinophils, Absolute 0.41 (H) 0.00 - 0.40 10*3/mm3    Basophils, Absolute 0.08 0.00 - 0.20 10*3/mm3    Immature Grans, Absolute 0.03 0.00 - 0.05 10*3/mm3    nRBC 0.0 0.0 - 0.2 /100 WBC   Hepatitis C antibody    Specimen: Blood   Result Value Ref Range    Hepatitis C Ab Non-Reactive Non-Reactive   Vitamin D 25 Hydroxy    Specimen: Blood   Result Value Ref Range    25 Hydroxy, Vitamin D 42.7 30.0 - 100.0 ng/ml   Urinalysis without microscopic (no culture) - Urine, Clean Catch    Specimen: Urine, Clean Catch   Result Value Ref Range    Color, UA Yellow Yellow, Straw    Appearance, UA Turbid (A) Clear    pH, UA 5.5 5.0 - 8.0    Specific Gravity, UA 1.029 1.005 - 1.030    Glucose, UA Negative Negative    Ketones, UA Negative Negative    Bilirubin, UA Negative Negative    Blood, UA Negative Negative    Protein, UA Negative Negative    Leuk Esterase, UA Negative Negative    Nitrite, UA Negative Negative    Urobilinogen, UA 0.2 E.U./dL 0.2 - 1.0 E.U./dL   Urine Culture - Urine, Urine, Catheter    Specimen: Urine, Catheter   Result Value Ref Range    Urine Culture No growth    TSH    Specimen: Blood   Result Value Ref Range    TSH 2.150 0.270 - 4.200 uIU/mL   T4, Free    Specimen: Blood   Result Value Ref Range    Free T4 0.85 (L) 0.93 - 1.70 ng/dL   Hemoglobin A1c    Specimen: Blood   Result Value Ref Range    Hemoglobin A1C 5.40 4.80 - 5.60 %   Vitamin B12    Specimen: Blood   Result Value Ref Range    Vitamin B-12 >2,000 (H) 211 - 946 pg/mL   Lipid Panel    Specimen: Blood   Result Value Ref Range    Total Cholesterol 188 0 - 200 mg/dL    Triglycerides 141 0 - 150 mg/dL    HDL Cholesterol 43 40 - 60 mg/dL    LDL Cholesterol  120 (H) 0 - 100 mg/dL    VLDL Cholesterol 25 5 - 40 mg/dL    LDL/HDL Ratio 2.72    Comprehensive Metabolic Panel    Specimen: Blood   Result Value Ref Range    Glucose 100 (H) 65 - 99 mg/dL    BUN 17 6 - 20 mg/dL    Creatinine 1.39 (H) 0.76 - 1.27 mg/dL    Sodium  142 136 - 145 mmol/L    Potassium 4.3 3.5 - 5.2 mmol/L    Chloride 103 98 - 107 mmol/L    CO2 24.2 22.0 - 29.0 mmol/L    Calcium 8.8 8.6 - 10.5 mg/dL    Total Protein 6.6 6.0 - 8.5 g/dL    Albumin 4.60 3.50 - 5.20 g/dL    ALT (SGPT) 30 1 - 41 U/L    AST (SGOT) 21 1 - 40 U/L    Alkaline Phosphatase 70 39 - 117 U/L    Total Bilirubin 0.4 0.0 - 1.2 mg/dL    Globulin 2.0 gm/dL    A/G Ratio 2.3 g/dL    BUN/Creatinine Ratio 12.2 7.0 - 25.0    Anion Gap 14.8 5.0 - 15.0 mmol/L    eGFR 64.5 >60.0 mL/min/1.73   Results for orders placed or performed in visit on 10/20/21   Basic Metabolic Panel    Specimen: Blood   Result Value Ref Range    Glucose 90 65 - 99 mg/dL    BUN 22 (H) 6 - 20 mg/dL    Creatinine 1.53 (H) 0.76 - 1.27 mg/dL    Sodium 141 136 - 145 mmol/L    Potassium 4.9 3.5 - 5.2 mmol/L    Chloride 103 98 - 107 mmol/L    CO2 28.4 22.0 - 29.0 mmol/L    Calcium 9.5 8.6 - 10.5 mg/dL    eGFR Non African Amer 50 (L) >60 mL/min/1.73    BUN/Creatinine Ratio 14.4 7.0 - 25.0    Anion Gap 9.6 5.0 - 15.0 mmol/L   Results for orders placed or performed in visit on 08/24/21   CBC Auto Differential    Specimen: Blood   Result Value Ref Range    WBC 9.99 3.40 - 10.80 10*3/mm3    RBC 5.46 4.14 - 5.80 10*6/mm3    Hemoglobin 17.7 13.0 - 17.7 g/dL    Hematocrit 49.9 37.5 - 51.0 %    MCV 91.4 79.0 - 97.0 fL    MCH 32.4 26.6 - 33.0 pg    MCHC 35.5 31.5 - 35.7 g/dL    RDW 13.7 12.3 - 15.4 %    RDW-SD 45.4 37.0 - 54.0 fl    MPV 10.4 6.0 - 12.0 fL    Platelets 293 140 - 450 10*3/mm3    Neutrophil % 60.6 42.7 - 76.0 %    Lymphocyte % 26.5 19.6 - 45.3 %    Monocyte % 7.8 5.0 - 12.0 %    Eosinophil % 3.7 0.3 - 6.2 %    Basophil % 1.0 0.0 - 1.5 %    Immature Grans % 0.4 0.0 - 0.5 %    Neutrophils, Absolute 6.05 1.70 - 7.00 10*3/mm3    Lymphocytes, Absolute 2.65 0.70 - 3.10 10*3/mm3    Monocytes, Absolute 0.78 0.10 - 0.90 10*3/mm3    Eosinophils, Absolute 0.37 0.00 - 0.40 10*3/mm3    Basophils, Absolute 0.10 0.00 - 0.20 10*3/mm3    Immature  Grans, Absolute 0.04 0.00 - 0.05 10*3/mm3    nRBC 0.0 0.0 - 0.2 /100 WBC     *Note: Due to a large number of results and/or encounters for the requested time period, some results have not been displayed. A complete set of results can be found in Results Review.

## 2023-01-20 ENCOUNTER — TELEPHONE (OUTPATIENT)
Dept: GASTROENTEROLOGY | Facility: CLINIC | Age: 43
End: 2023-01-20
Payer: COMMERCIAL

## 2023-01-27 ENCOUNTER — OFFICE VISIT (OUTPATIENT)
Dept: FAMILY MEDICINE CLINIC | Facility: CLINIC | Age: 43
End: 2023-01-27
Payer: COMMERCIAL

## 2023-01-27 VITALS
HEART RATE: 88 BPM | DIASTOLIC BLOOD PRESSURE: 82 MMHG | SYSTOLIC BLOOD PRESSURE: 126 MMHG | BODY MASS INDEX: 34.43 KG/M2 | TEMPERATURE: 98 F | WEIGHT: 227.2 LBS | OXYGEN SATURATION: 98 % | HEIGHT: 68 IN

## 2023-01-27 DIAGNOSIS — R94.6 ABNORMAL RESULTS OF THYROID FUNCTION STUDIES: ICD-10-CM

## 2023-01-27 DIAGNOSIS — S56.519A PARTIAL TEAR OF COMMON EXTENSOR TENDON OF ELBOW: ICD-10-CM

## 2023-01-27 DIAGNOSIS — M79.631 RIGHT FOREARM PAIN: ICD-10-CM

## 2023-01-27 DIAGNOSIS — K21.00 GASTROESOPHAGEAL REFLUX DISEASE WITH ESOPHAGITIS WITHOUT HEMORRHAGE: ICD-10-CM

## 2023-01-27 DIAGNOSIS — Z79.60 LONG-TERM USE OF IMMUNOSUPPRESSANT MEDICATION: ICD-10-CM

## 2023-01-27 DIAGNOSIS — K50.019 CROHN'S DISEASE OF SMALL INTESTINE WITH COMPLICATION: ICD-10-CM

## 2023-01-27 DIAGNOSIS — Z72.0 TOBACCO USER: ICD-10-CM

## 2023-01-27 DIAGNOSIS — E66.9 CLASS 1 OBESITY WITH SERIOUS COMORBIDITY AND BODY MASS INDEX (BMI) OF 34.0 TO 34.9 IN ADULT, UNSPECIFIED OBESITY TYPE: Primary | ICD-10-CM

## 2023-01-27 DIAGNOSIS — G43.809 OTHER MIGRAINE WITHOUT STATUS MIGRAINOSUS, NOT INTRACTABLE: ICD-10-CM

## 2023-01-27 DIAGNOSIS — I10 ESSENTIAL HYPERTENSION: ICD-10-CM

## 2023-01-27 DIAGNOSIS — N18.31 STAGE 3A CHRONIC KIDNEY DISEASE: ICD-10-CM

## 2023-01-27 PROCEDURE — 99214 OFFICE O/P EST MOD 30 MIN: CPT | Performed by: FAMILY MEDICINE

## 2023-01-27 RX ORDER — NICOTINE 10 MG
1 CARTRIDGE (EA) INHALATION AS NEEDED
Qty: 1683 EACH | Refills: 3 | Status: SHIPPED | OUTPATIENT
Start: 2023-01-27 | End: 2023-02-20

## 2023-01-27 RX ORDER — RIZATRIPTAN BENZOATE 10 MG/1
10 TABLET ORAL ONCE AS NEEDED
Qty: 10 TABLET | Refills: 3 | Status: SHIPPED | OUTPATIENT
Start: 2023-01-27 | End: 2023-01-27

## 2023-01-27 RX ORDER — ONDANSETRON 8 MG/1
8 TABLET, ORALLY DISINTEGRATING ORAL EVERY 8 HOURS PRN
Qty: 90 TABLET | Refills: 3 | Status: SHIPPED | OUTPATIENT
Start: 2023-01-27 | End: 2023-03-30 | Stop reason: SDUPTHER

## 2023-01-27 NOTE — PATIENT INSTRUCTIONS
Stop maxalt    Can start on ubrelvy 100 mg as needed may repeat in 1-2 hours    Recheck in 2 months     Get labwork before next visit

## 2023-02-06 ENCOUNTER — OFFICE VISIT (OUTPATIENT)
Dept: ORTHOPEDIC SURGERY | Facility: CLINIC | Age: 43
End: 2023-02-06
Payer: COMMERCIAL

## 2023-02-06 VITALS — WEIGHT: 230 LBS | BODY MASS INDEX: 34.86 KG/M2 | HEIGHT: 68 IN

## 2023-02-06 DIAGNOSIS — M77.01 MEDIAL EPICONDYLITIS OF RIGHT ELBOW: ICD-10-CM

## 2023-02-06 DIAGNOSIS — S56.519A PARTIAL TEAR OF COMMON EXTENSOR TENDON OF ELBOW: ICD-10-CM

## 2023-02-06 DIAGNOSIS — M79.601 RIGHT ARM PAIN: Primary | ICD-10-CM

## 2023-02-06 PROCEDURE — 99214 OFFICE O/P EST MOD 30 MIN: CPT | Performed by: NURSE PRACTITIONER

## 2023-02-06 RX ORDER — PREDNISONE 10 MG/1
TABLET ORAL
Qty: 15 TABLET | Refills: 0 | Status: SHIPPED | OUTPATIENT
Start: 2023-02-06 | End: 2023-02-20

## 2023-02-06 NOTE — PROGRESS NOTES
Michel Harmon is a 42 y.o. male   Primary provider:  Dru Vicente MD       Chief Complaint   Patient presents with   • Right Forearm - Initial Evaluation       HISTORY OF PRESENT ILLNESS:    Mr. Harmon is a 42-year-old male who presents today with complaints of medial right elbow pain.  He reports pain with lifting palm up.  He indicates his medial epicondyle as his main area of pain.  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  Lifting seems to be his most aggravating activity.  He has tried rest without relief of symptoms.  He takes occasional NSAIDs which do seem to help, however he keeps this to a minimum due to chronic kidney disease.  No trauma or known injury.  He had an MRI which did show a sprain of the common extensor tendon.      Arm Injury   Injury mechanism: a lifting injury. The pain is present in the right forearm. The quality of the pain is described as aching. The pain is severe. The pain has been intermittent since the incident. Associated symptoms include numbness. Associated symptoms comments: swelling. He has tried rest for the symptoms.        CONCURRENT MEDICAL HISTORY:    Past Medical History:   Diagnosis Date   • Anxiety 2016   • Asthma 2013    Sleep Apnea   • Cholelithiasis 2013   • Colon polyp 2018   • Crohn's disease (HCC)    • Erectile dysfunction 2014   • GERD (gastroesophageal reflux disease)    • Headache 2016   • Irritable bowel syndrome 2016   • Kidney stone 2018   • Pancreatitis 2017   • Renal insufficiency 2022    3C   • Sleep apnea    • Stroke (HCC) 2015    During ielloscectomy   • Visual impairment 1985    Glasses       No Known Allergies      Current Outpatient Medications:   •  amLODIPine (NORVASC) 5 MG tablet, , Disp: , Rfl:   •  Aspirin-Salicylamide-Caffeine (BC HEADACHE POWDER PO), Take  by mouth 2 (Two) Times a Day As Needed., Disp: , Rfl:   •  cyclobenzaprine (FLEXERIL) 5 MG tablet, Take 5 mg by mouth 3 (Three)  Times a Day As Needed., Disp: , Rfl:   •  esomeprazole (nexIUM) 40 MG capsule, Take 1 capsule by mouth Every Morning Before Breakfast., Disp: 30 capsule, Rfl: 3  •  gabapentin (NEURONTIN) 400 MG capsule, , Disp: , Rfl:   •  glycopyrrolate (ROBINUL) 2 MG tablet, Take 1 tablet by mouth 2 (Two) Times a Day., Disp: 60 tablet, Rfl: 2  •  melatonin 5 MG tablet tablet, Take 5 mg by mouth As Needed., Disp: , Rfl:   •  nicotine (Nicotrol) 10 MG inhaler, Inhale 1 puff As Needed for Smoking Cessation. Can use up 6 times a day, Disp: 1683 each, Rfl: 3  •  ondansetron ODT (Zofran ODT) 8 MG disintegrating tablet, Place 1 tablet on the tongue Every 8 (Eight) Hours As Needed for Nausea or Vomiting., Disp: 90 tablet, Rfl: 3  •  oxyCODONE-acetaminophen (PERCOCET)  MG per tablet, Take 1 tablet by mouth 3 (Three) Times a Day., Disp: , Rfl:   •  tiZANidine (ZANAFLEX) 4 MG tablet, Take 4 mg by mouth 2 (Two) Times a Day., Disp: , Rfl:   •  vitamin D (ERGOCALCIFEROL) 1.25 MG (42335 UT) capsule capsule, Take 1 capsule by mouth Every 7 (Seven) Days., Disp: 5 capsule, Rfl: 3  •  predniSONE (DELTASONE) 10 MG tablet, Take twice a day for 5 days, then take once daily until gone., Disp: 15 tablet, Rfl: 0    Past Surgical History:   Procedure Laterality Date   • COLON SURGERY  2015   • COLONOSCOPY     • COLONOSCOPY N/A 10/07/2020    Procedure: COLONOSCOPY--look TI;  Surgeon: Wisam Carpio MD;  Location: [facility] ENDOSCOPY;  Service: Gastroenterology;  Laterality: N/A;   • ENDOSCOPY N/A 10/07/2020    Procedure: ESOPHAGOGASTRODUODENOSCOPY--bx;  Surgeon: Wisam Carpio MD;  Location: [facility] ENDOSCOPY;  Service: Gastroenterology;  Laterality: N/A;   • HERNIA REPAIR  1981   • UPPER GASTROINTESTINAL ENDOSCOPY  10/07/2020       Family History   Problem Relation Age of Onset   • Breast cancer Other    • Brain cancer Other    • Ulcers Other    • Bleeding Disorder Other    • Anxiety disorder Sister    • Mental illness Sister         Involuntary  "commitment mid 90s   • Cancer Maternal Aunt         Breast cancer then brain cancer        Social History     Socioeconomic History   • Marital status:    Tobacco Use   • Smoking status: Every Day     Packs/day: 1.00     Years: 26.00     Pack years: 26.00     Types: Cigarettes     Start date: 1/1/1996   • Smokeless tobacco: Never   Vaping Use   • Vaping Use: Never used   Substance and Sexual Activity   • Alcohol use: Not Currently   • Drug use: Never   • Sexual activity: Yes     Partners: Female     Birth control/protection: None     Comment: We're old        Review of Systems   Musculoskeletal: Positive for joint swelling.   Neurological: Positive for numbness.       PHYSICAL EXAMINATION:       Ht 172.7 cm (68\")   Wt 104 kg (230 lb)   BMI 34.97 kg/m²     Physical Exam  Vitals and nursing note reviewed.   Constitutional:       General: He is not in acute distress.     Appearance: He is well-developed. He is not toxic-appearing.   HENT:      Head: Normocephalic.   Pulmonary:      Effort: Pulmonary effort is normal. No respiratory distress.   Skin:     General: Skin is warm and dry.   Neurological:      Mental Status: He is alert and oriented to person, place, and time.   Psychiatric:         Behavior: Behavior normal.         Thought Content: Thought content normal.         Judgment: Judgment normal.         GAIT:     []  Normal  []  Antalgic    Assistive device: [x]  None  []  Walker     []  Crutches  []  Cane     []  Wheelchair  []  Stretcher    Right Elbow Exam     Tenderness   The patient is experiencing tenderness in the medial epicondyle.     Range of Motion   The patient has normal right elbow ROM.    Other   Erythema: absent    Comments:  Pain with lifting palm up.  Maximum point of tenderness is around the medial epicondyle  No pain with palpation of the lateral condyle/common extensor tendon attachment site.                Appointment Information    PACS Images     Radiology Images  Study " Result    Narrative & Impression      Two view right forearm     HISTORY: Right forearm pain. Pain right antecubital fossa.     AP and lateral views obtained.     COMPARISON: None     FINDINGS:   No fracture or dislocation.  No other osseous or articular abnormality.     IMPRESSION:  CONCLUSION:  Normal two-view right forearm     [id number]     Electronically signed by:  Fermin Mcclellan MD  10/15/2022 2:55 PM  CDT Workstation: [id number]     12/5/22    MRI Radius Ulna Right Without Contrast [[id number]] (Order [id number])  Order  Status: Final result     Study Notes       Khadra Shea on 12/5/2022  2:09 PM CST   Right sided elbow pain, fall, pain to inside of elbow x 6 mo ago     Appointment Information    PACS Images     Radiology Images  Study Result    Narrative & Impression   EXAM: MR UPPER EXTREMITY WITHOUT IV CONTRAST     HISTORY: 41-year-old male with right upper extremity/forearm  pain. Right-sided elbow pain, fall, pain in side of elbow 6  months prior     COMPARISONS: Radiograph 10/13/2022     TECHNIQUE: Multiplanar, multi sequence MR images were obtained of  the right elbow without the use of intravenous contrast.     FINDINGS:  Joint effusion: Trace joint effusion.     Medial Compartment:  Ulnar collateral ligament is intact. Common flexor tendon is  intact. Medial epicondyle is intact without fracture or edema.     Lateral Compartment:  Radial collateral ligament is intact. Lateral ulnar collateral  ligament is intact.  Common extensor tendon grossly intact with  very small amount of thin curvilinear increased T2 signal at its  lateral epicondylar insertion. Lateral epicondyle is intact  without fracture or edema.     Posterior Compartment:  Triceps is intact. Olecranon is intact without fracture or edema.  No bursitis.     Anterior compartment:  Biceps is intact. Brachialis is intact. No bicipitoradial  bursitis.     Articulations:  Radio-capitellar joint is normal. Ulno-humeral joint is normal.  Proximal radio-ulnar  joint is normal.     Miscellaneous:  Bone marrow signal is normal. Muscles are normal in bulk and  signal. Neurovascular bundles are within normal limits. Cubital  tunnel is normal. Retinaculum is intact.     IMPRESSION:  Strain versus healing partial tear of the common extensor tendon  without tear.     Trace elbow joint effusion.     Electronically signed by:  Viviana Doll MD  12/5/2022  3:55 PM CST Workstation: [id number]           ASSESSMENT:    Diagnoses and all orders for this visit:    Right arm pain    Medial epicondylitis of right elbow    Partial tear of common extensor tendon of elbow    Other orders  -     predniSONE (DELTASONE) 10 MG tablet; Take twice a day for 5 days, then take once daily until gone.          PLAN    X-rays were reviewed.  Patient's MRI shows strain to common extensor tendon, however exam and complaints are more consistent with medial epicondylitis rather than lateral.  After discussing available treatment options including risk, benefits, alternatives, patient prescribed oral prednisone and given HEP.  Patient also instructed on avoiding repetitive movements as much as possible and RICE therapy.  We discussed a localized injection which patient may be interested in if the above-mentioned plan does not resolve symptoms.  Patient to return in 2 to 4 weeks for recheck if not feeling better we will consider injection or formal PT at this time.    Return in about 2 weeks (around 2/20/2023).    EMR Dragon/Transciption Disclaimer: Some of this note may be an electronic transcription/translation of spoken language to printed text.  The electronic translation of spoken language may permit erroneous, or at times, nonsensical words or phrases to be inadvertently transcribed. Although I have reviewed the note for such errors, some may still exist.       This document has been electronically signed by Lyric CHRISITANSON on February 6, 2023 09:52 CST

## 2023-02-20 ENCOUNTER — LAB (OUTPATIENT)
Dept: LAB | Facility: HOSPITAL | Age: 43
End: 2023-02-20
Payer: COMMERCIAL

## 2023-02-20 ENCOUNTER — OFFICE VISIT (OUTPATIENT)
Dept: ORTHOPEDIC SURGERY | Facility: CLINIC | Age: 43
End: 2023-02-20
Payer: COMMERCIAL

## 2023-02-20 VITALS — WEIGHT: 230 LBS | BODY MASS INDEX: 34.86 KG/M2 | HEIGHT: 68 IN

## 2023-02-20 DIAGNOSIS — E78.2 MIXED HYPERLIPIDEMIA: ICD-10-CM

## 2023-02-20 DIAGNOSIS — K50.019 CROHN'S DISEASE OF SMALL INTESTINE WITH COMPLICATION: ICD-10-CM

## 2023-02-20 DIAGNOSIS — Z72.0 TOBACCO USER: ICD-10-CM

## 2023-02-20 DIAGNOSIS — M79.601 RIGHT ARM PAIN: Primary | ICD-10-CM

## 2023-02-20 DIAGNOSIS — S56.519A PARTIAL TEAR OF COMMON EXTENSOR TENDON OF ELBOW: ICD-10-CM

## 2023-02-20 DIAGNOSIS — R94.6 ABNORMAL THYROID FUNCTION TEST: ICD-10-CM

## 2023-02-20 DIAGNOSIS — G43.809 OTHER MIGRAINE WITHOUT STATUS MIGRAINOSUS, NOT INTRACTABLE: ICD-10-CM

## 2023-02-20 DIAGNOSIS — M77.01 MEDIAL EPICONDYLITIS OF RIGHT ELBOW: ICD-10-CM

## 2023-02-20 DIAGNOSIS — N18.31 STAGE 3A CHRONIC KIDNEY DISEASE: ICD-10-CM

## 2023-02-20 LAB
ALBUMIN SERPL-MCNC: 4.3 G/DL (ref 3.5–5.2)
ALBUMIN/GLOB SERPL: 1.7 G/DL
ALP SERPL-CCNC: 78 U/L (ref 39–117)
ALT SERPL W P-5'-P-CCNC: 24 U/L (ref 1–41)
ANION GAP SERPL CALCULATED.3IONS-SCNC: 9 MMOL/L (ref 5–15)
AST SERPL-CCNC: 15 U/L (ref 1–40)
BASOPHILS # BLD AUTO: 0.05 10*3/MM3 (ref 0–0.2)
BASOPHILS NFR BLD AUTO: 0.6 % (ref 0–1.5)
BILIRUB SERPL-MCNC: 0.3 MG/DL (ref 0–1.2)
BUN SERPL-MCNC: 17 MG/DL (ref 6–20)
BUN/CREAT SERPL: 15.6 (ref 7–25)
CALCIUM SPEC-SCNC: 9 MG/DL (ref 8.6–10.5)
CHLORIDE SERPL-SCNC: 105 MMOL/L (ref 98–107)
CHOLEST SERPL-MCNC: 202 MG/DL (ref 0–200)
CO2 SERPL-SCNC: 26 MMOL/L (ref 22–29)
CREAT SERPL-MCNC: 1.09 MG/DL (ref 0.76–1.27)
DEPRECATED RDW RBC AUTO: 43.9 FL (ref 37–54)
EGFRCR SERPLBLD CKD-EPI 2021: 85.8 ML/MIN/1.73
EOSINOPHIL # BLD AUTO: 0.22 10*3/MM3 (ref 0–0.4)
EOSINOPHIL NFR BLD AUTO: 2.5 % (ref 0.3–6.2)
ERYTHROCYTE [DISTWIDTH] IN BLOOD BY AUTOMATED COUNT: 13 % (ref 12.3–15.4)
GLOBULIN UR ELPH-MCNC: 2.6 GM/DL
GLUCOSE SERPL-MCNC: 113 MG/DL (ref 65–99)
HCT VFR BLD AUTO: 47 % (ref 37.5–51)
HDLC SERPL-MCNC: 42 MG/DL (ref 40–60)
HGB BLD-MCNC: 16.6 G/DL (ref 13–17.7)
IMM GRANULOCYTES # BLD AUTO: 0.04 10*3/MM3 (ref 0–0.05)
IMM GRANULOCYTES NFR BLD AUTO: 0.5 % (ref 0–0.5)
LDLC SERPL CALC-MCNC: 135 MG/DL (ref 0–100)
LDLC/HDLC SERPL: 3.16 {RATIO}
LYMPHOCYTES # BLD AUTO: 2.25 10*3/MM3 (ref 0.7–3.1)
LYMPHOCYTES NFR BLD AUTO: 25.5 % (ref 19.6–45.3)
MCH RBC QN AUTO: 32.2 PG (ref 26.6–33)
MCHC RBC AUTO-ENTMCNC: 35.3 G/DL (ref 31.5–35.7)
MCV RBC AUTO: 91.3 FL (ref 79–97)
MONOCYTES # BLD AUTO: 0.6 10*3/MM3 (ref 0.1–0.9)
MONOCYTES NFR BLD AUTO: 6.8 % (ref 5–12)
NEUTROPHILS NFR BLD AUTO: 5.65 10*3/MM3 (ref 1.7–7)
NEUTROPHILS NFR BLD AUTO: 64.1 % (ref 42.7–76)
NRBC BLD AUTO-RTO: 0 /100 WBC (ref 0–0.2)
PLATELET # BLD AUTO: 255 10*3/MM3 (ref 140–450)
PMV BLD AUTO: 10.1 FL (ref 6–12)
POTASSIUM SERPL-SCNC: 4.6 MMOL/L (ref 3.5–5.2)
PROT SERPL-MCNC: 6.9 G/DL (ref 6–8.5)
RBC # BLD AUTO: 5.15 10*6/MM3 (ref 4.14–5.8)
SODIUM SERPL-SCNC: 140 MMOL/L (ref 136–145)
T4 FREE SERPL-MCNC: 1.05 NG/DL (ref 0.93–1.7)
TRIGL SERPL-MCNC: 137 MG/DL (ref 0–150)
TSH SERPL DL<=0.05 MIU/L-ACNC: 2.36 UIU/ML (ref 0.27–4.2)
VLDLC SERPL-MCNC: 25 MG/DL (ref 5–40)
WBC NRBC COR # BLD: 8.81 10*3/MM3 (ref 3.4–10.8)

## 2023-02-20 PROCEDURE — 80061 LIPID PANEL: CPT

## 2023-02-20 PROCEDURE — 99214 OFFICE O/P EST MOD 30 MIN: CPT | Performed by: NURSE PRACTITIONER

## 2023-02-20 PROCEDURE — 85025 COMPLETE CBC W/AUTO DIFF WBC: CPT

## 2023-02-20 PROCEDURE — 84443 ASSAY THYROID STIM HORMONE: CPT

## 2023-02-20 PROCEDURE — 84439 ASSAY OF FREE THYROXINE: CPT

## 2023-02-20 PROCEDURE — 80053 COMPREHEN METABOLIC PANEL: CPT

## 2023-02-20 RX ORDER — PREDNISONE 10 MG/1
TABLET ORAL
Qty: 15 TABLET | Refills: 0 | Status: SHIPPED | OUTPATIENT
Start: 2023-02-20

## 2023-03-06 NOTE — PROGRESS NOTES
Subjective:  Michel Harmon is a 42 y.o. male who presents for       Patient Active Problem List   Diagnosis   • Crohn's disease of small intestine with complication (HCC)   • Hiatal hernia   • Long-term use of immunosuppressant medication   • Obstructive sleep apnea syndrome   • Pancreatitis   • Perforation of small intestine (HCC)   • Tobacco dependence   • Chronic migraine   • Obesity (BMI 30-39.9)   • Nausea and vomiting   • Gastroesophageal reflux disease with esophagitis   • Diarrhea   • Migraine   • High risk medication use   • Stage 3a chronic kidney disease (HCC)   • Elevated blood pressure reading   • Right lower quadrant abdominal pain   • Tobacco user   • Primary osteoarthritis of both feet   • Bilateral foot pain   • Abnormal results of thyroid function studies   • Right forearm pain   • Essential hypertension   • Class 1 obesity with serious comorbidity and body mass index (BMI) of 34.0 to 34.9 in adult   • Partial tear of common extensor tendon of elbow   • Stage 2 chronic kidney disease           Current Outpatient Medications:   •  amLODIPine (NORVASC) 5 MG tablet, , Disp: , Rfl:   •  Aspirin-Salicylamide-Caffeine (BC HEADACHE POWDER PO), Take  by mouth 2 (Two) Times a Day As Needed., Disp: , Rfl:   •  cyclobenzaprine (FLEXERIL) 5 MG tablet, Take 5 mg by mouth 3 (Three) Times a Day As Needed., Disp: , Rfl:   •  esomeprazole (nexIUM) 40 MG capsule, Take 1 capsule by mouth Every Morning Before Breakfast., Disp: 30 capsule, Rfl: 3  •  gabapentin (NEURONTIN) 400 MG capsule, , Disp: , Rfl:   •  glycopyrrolate (ROBINUL) 2 MG tablet, Take 1 tablet by mouth 2 (Two) Times a Day., Disp: 60 tablet, Rfl: 2  •  melatonin 5 MG tablet tablet, Take 5 mg by mouth As Needed., Disp: , Rfl:   •  ondansetron ODT (Zofran ODT) 8 MG disintegrating tablet, Place 1 tablet on the tongue Every 8 (Eight) Hours As Needed for Nausea or Vomiting., Disp: 90 tablet, Rfl: 3  •  oxyCODONE-acetaminophen (PERCOCET)  MG per  tablet, Take 1 tablet by mouth 3 (Three) Times a Day., Disp: , Rfl:   •  predniSONE (DELTASONE) 10 MG tablet, Take twice a day for 5 days, then take once daily until gone., Disp: 15 tablet, Rfl: 0  •  tiZANidine (ZANAFLEX) 4 MG tablet, Take 4 mg by mouth 2 (Two) Times a Day., Disp: , Rfl:   •  vitamin D (ERGOCALCIFEROL) 1.25 MG (71692 UT) capsule capsule, TAKE 1 CAPSULE WEEKLY, Disp: 5 capsule, Rfl: 3    Pt is 43 yo male with management of obesity, crohn's disease, GERD with esophagitis. History of pancreatitis , insomnia, migrained headaches. Obesity history of perforation of small intestines, Nausea/vomiting, hiatal hernia, KEESHA, tobacco user, immunocopromised, high risk medication use, sp ileum surgery., HLP, partial tear of common extensor tendon of elbow, medial epicondylitis of right elbow, CKD stage 2      11/16/22 in office visit for recheck . Pt has yet to get labwork ordered on 10/13/22. Pt had x-ray of forearm on 10/13/22 that was normal. X-ray of both feet on 10/13/22 showed osteoarthritis of both feet with left slightly worse than right. PT also has right calcaneal achilles spur. Pt was referred to Podiatry and has upcoming appt on 11/22/22. Pt continues to have pain in right antecubital space/right forearm. He was ordered MRI of radius/ulna but he would have to pay $2100.  He cannot  anything heavy. It is affecting his job for about 6 months. He is able to write his right hand.     1/27/23 in office visit for recheck. Pt saw Podiatry on 11/22/22 for his bilateral foot pain and plantar fascitis,  He was recommend OTC insole and stretching exercises.  He also saw GI on 1/20/23 for his Crohn's disease. Pt has problabl chronic ileitis,  He will be recheck in 6 months. He has yet to get labwork ordered on 10/13/22. Pt did have MRI of radius/ulna on 12/5/22 that showed strain vs healing partial tear of the common extensor tendon without tear.  Pt was referred to Orthopedic. BP is stable.   He continues  to take  maxalt for his headaches. He averages about 2 headaches a month. They are located frontal.  He does have sensitivity to light     4/2/23 in office visit for recheck. Pt has seen Orthopedic on 2/20/23 and 2/6/23 for his right arm pain/medial epicondylitis of right elbow and partial tear of common extensor tendon. His arm pain ahs improved with prednisone and localized injections and physical therapy. Pt had labwork done on 2/20/23 that showed normal thyroid studies. Lipid panel  LDL at 135. CMP showed gl;ucose at 113 GFR at 85.8 and CBC showed normal hemoglobin and wBC. His right forearm is better and less pain. He states that [employer] did not work for his migraines. He would like to try maxalt again. He continues to smoke 1 ppd of tobacco. His insurance did not cover nicotrol. He also reports a mass or bulge on his RUQ for about 2 months he also has pain when he eats. He does get nausea.         Nicotine Dependence  Presents for follow-up visit. Symptoms include fatigue. Symptoms are negative for sore throat. The symptoms have been stable. He smokes 1 pack of cigarettes per day. Compliance with prior treatments has been variable.   Migraine  Headache pattern:  Headache sometimes there, sometimes not at all  Initial event:  None  Recent changes:  Headaches last longer than they used to  Frequency:  1 to 3 per month  Providers seen:  None  Lifetime total:  20+  Number of ER visits for headache:  0  Time of day symptoms are worse:  No specific time of day  Do headaches wake patient from sleep?: No    Season symptoms are worse:  No particular season  Laterality:  Unable to describe  Aggravating factors:  Light  Changes in thinking and mood:  Fatigue  Abortive medication frequency of use:  Less than 1 day a week  Abortive medications tried:  Rizatriptan  Vitamins and supplements tried:  None  Arm Pain   The incident occurred more than 6 months   ago. The incident occurred at home. The injury mechanism is unknown  (lifting heavy object ). The pain is present in the right forearm (right antecubital space ). The quality of the pain is described as aching. The pain does not radiate. The pain is at a severity of 8/10. The pain is moderate. Associated symptoms include numbness. Pertinent negatives include no chest pain. The symptoms are aggravated by movement and lifting. And turning his right arm inward  He has tried nothing for the symptoms. The treatment provided no relief.   Abdominal Pain  This is a chronic problem. The current episode started more than 1 month ago. The onset quality is sudden. The problem occurs constantly. The problem has been unchanged. The pain is located in the RLQ. The pain is at a severity of 4/10. The pain is moderate. The quality of the pain is aching. The abdominal pain radiates to the RLQ. Associated symptoms include arthralgias and headaches. Pertinent negatives include no anorexia, belching, constipation, diarrhea, dysuria, fever, flatus, frequency, hematochezia, hematuria, melena, myalgias, nausea or vomiting. Nothing aggravates the pain. The pain is relieved by nothing. His past medical history is significant for Crohn's disease. There is no history of abdominal surgery, colon cancer, gallstones, GERD, irritable bowel syndrome, pancreatitis, PUD or ulcerative colitis.   GI Problem  The primary symptoms include fatigue, abdominal pain and arthralgias. Primary symptoms do not include fever, nausea, vomiting or diarrhea.   Obesity  This is a chronic problem. The current episode started more than 1 year ago. The problem occurs constantly. Associated symptoms include abdominal pain, arthralgias, fatigue, headaches, numbness and weakness. Pertinent negatives include no chest pain, chills, congestion, coughing, diaphoresis, fever, nausea, sore throat or vomiting. Nothing aggravates the symptoms. He has tried nothing for the symptoms. The treatment provided no relief      Review of Systems  Review of  Systems   Constitutional: Positive for activity change and fatigue. Negative for appetite change, chills, diaphoresis and fever.   HENT: Negative for congestion, postnasal drip, rhinorrhea, sinus pressure, sinus pain, sneezing, sore throat, trouble swallowing and voice change.    Respiratory: Negative for cough, choking, chest tightness, shortness of breath, wheezing and stridor.    Cardiovascular: Negative for chest pain.   Gastrointestinal: Positive for abdominal pain. Negative for diarrhea, nausea and vomiting.   Musculoskeletal: Positive for arthralgias.   Neurological: Positive for weakness and headaches.       Patient Active Problem List   Diagnosis   • Crohn's disease of small intestine with complication (HCC)   • Hiatal hernia   • Long-term use of immunosuppressant medication   • Obstructive sleep apnea syndrome   • Pancreatitis   • Perforation of small intestine (HCC)   • Tobacco dependence   • Chronic migraine   • Obesity (BMI 30-39.9)   • Nausea and vomiting   • Gastroesophageal reflux disease with esophagitis   • Diarrhea   • Migraine   • High risk medication use   • Stage 3a chronic kidney disease (HCC)   • Elevated blood pressure reading   • Right lower quadrant abdominal pain   • Tobacco user   • Primary osteoarthritis of both feet   • Bilateral foot pain   • Abnormal results of thyroid function studies   • Right forearm pain   • Essential hypertension   • Class 1 obesity with serious comorbidity and body mass index (BMI) of 34.0 to 34.9 in adult   • Partial tear of common extensor tendon of elbow   • Stage 2 chronic kidney disease     Past Surgical History:   Procedure Laterality Date   • COLON SURGERY  2015   • COLONOSCOPY     • COLONOSCOPY N/A 10/07/2020    Procedure: COLONOSCOPY--look TI;  Surgeon: Wisam Carpio MD;  Location: [facility] ENDOSCOPY;  Service: Gastroenterology;  Laterality: N/A;   • ENDOSCOPY N/A 10/07/2020    Procedure: ESOPHAGOGASTRODUODENOSCOPY--bx;  Surgeon: Wisam Carpio MD;   Location: [facility] END[facility];  Service: Gastroenterology;  Laterality: N/A;   • HERNIA REPAIR  1981   • UPPER GASTROINTESTINAL ENDOSCOPY  10/07/2020     Social History     Socioeconomic History   • Marital status:    Tobacco Use   • Smoking status: Every Day     Packs/day: 1.00     Years: 26.00     Pack years: 26.00     Types: Cigarettes     Start date: 1/1/1996   • Smokeless tobacco: Never   Vaping Use   • Vaping Use: Never used   Substance and Sexual Activity   • Alcohol use: Not Currently   • Drug use: Never   • Sexual activity: Yes     Partners: Female     Birth control/protection: None     Comment: We're old     Family History   Problem Relation Age of Onset   • Breast cancer Other    • Brain cancer Other    • Ulcers Other    • Bleeding Disorder Other    • Anxiety disorder Sister    • Mental illness Sister         Involuntary commitment mid 90s   • Cancer Maternal Aunt         Breast cancer then brain cancer     Lab on 02/20/2023   Component Date Value Ref Range Status   • WBC 02/20/2023 8.81  3.40 - 10.80 10*3/mm3 Final   • RBC 02/20/2023 5.15  4.14 - 5.80 10*6/mm3 Final   • Hemoglobin 02/20/2023 16.6  13.0 - 17.7 g/dL Final   • Hematocrit 02/20/2023 47.0  37.5 - 51.0 % Final   • MCV 02/20/2023 91.3  79.0 - 97.0 fL Final   • MCH 02/20/2023 32.2  26.6 - 33.0 pg Final   • MCHC 02/20/2023 35.3  31.5 - 35.7 g/dL Final   • RDW 02/20/2023 13.0  12.3 - 15.4 % Final   • RDW-SD 02/20/2023 43.9  37.0 - 54.0 fl Final   • MPV 02/20/2023 10.1  6.0 - 12.0 fL Final   • Platelets 02/20/2023 255  140 - 450 10*3/mm3 Final   • Neutrophil % 02/20/2023 64.1  42.7 - 76.0 % Final   • Lymphocyte % 02/20/2023 25.5  19.6 - 45.3 % Final   • Monocyte % 02/20/2023 6.8  5.0 - 12.0 % Final   • Eosinophil % 02/20/2023 2.5  0.3 - 6.2 % Final   • Basophil % 02/20/2023 0.6  0.0 - 1.5 % Final   • Immature Grans % 02/20/2023 0.5  0.0 - 0.5 % Final   • Neutrophils, Absolute 02/20/2023 5.65  1.70 - 7.00 10*3/mm3 Final   • Lymphocytes, Absolute  02/20/2023 2.25  0.70 - 3.10 10*3/mm3 Final   • Monocytes, Absolute 02/20/2023 0.60  0.10 - 0.90 10*3/mm3 Final   • Eosinophils, Absolute 02/20/2023 0.22  0.00 - 0.40 10*3/mm3 Final   • Basophils, Absolute 02/20/2023 0.05  0.00 - 0.20 10*3/mm3 Final   • Immature Grans, Absolute 02/20/2023 0.04  0.00 - 0.05 10*3/mm3 Final   • nRBC 02/20/2023 0.0  0.0 - 0.2 /100 WBC Final   • Glucose 02/20/2023 113 (H)  65 - 99 mg/dL Final   • BUN 02/20/2023 17  6 - 20 mg/dL Final   • Creatinine 02/20/2023 1.09  0.76 - 1.27 mg/dL Final   • Sodium 02/20/2023 140  136 - 145 mmol/L Final   • Potassium 02/20/2023 4.6  3.5 - 5.2 mmol/L Final   • Chloride 02/20/2023 105  98 - 107 mmol/L Final   • CO2 02/20/2023 26.0  22.0 - 29.0 mmol/L Final   • Calcium 02/20/2023 9.0  8.6 - 10.5 mg/dL Final   • Total Protein 02/20/2023 6.9  6.0 - 8.5 g/dL Final   • Albumin 02/20/2023 4.3  3.5 - 5.2 g/dL Final   • ALT (SGPT) 02/20/2023 24  1 - 41 U/L Final   • AST (SGOT) 02/20/2023 15  1 - 40 U/L Final   • Alkaline Phosphatase 02/20/2023 78  39 - 117 U/L Final   • Total Bilirubin 02/20/2023 0.3  0.0 - 1.2 mg/dL Final   • Globulin 02/20/2023 2.6  gm/dL Final   • A/G Ratio 02/20/2023 1.7  g/dL Final   • BUN/Creatinine Ratio 02/20/2023 15.6  7.0 - 25.0 Final   • Anion Gap 02/20/2023 9.0  5.0 - 15.0 mmol/L Final   • eGFR 02/20/2023 85.8  >60.0 mL/min/1.73 Final   • Total Cholesterol 02/20/2023 202 (H)  0 - 200 mg/dL Final   • Triglycerides 02/20/2023 137  0 - 150 mg/dL Final   • HDL Cholesterol 02/20/2023 42  40 - 60 mg/dL Final   • LDL Cholesterol  02/20/2023 135 (H)  0 - 100 mg/dL Final   • VLDL Cholesterol 02/20/2023 25  5 - 40 mg/dL Final   • LDL/HDL Ratio 02/20/2023 3.16   Final   • TSH 02/20/2023 2.360  0.270 - 4.200 uIU/mL Final   • Free T4 02/20/2023 1.05  0.93 - 1.70 ng/dL Final      MRI Radius Ulna Right Without Contrast  Narrative: EXAM: MR UPPER EXTREMITY WITHOUT IV CONTRAST    HISTORY: 41-year-old male with right upper extremity/forearm  pain.  Right-sided elbow pain, fall, pain in side of elbow 6  months prior    COMPARISONS: Radiograph 10/13/2022    TECHNIQUE: Multiplanar, multi sequence MR images were obtained of  the right elbow without the use of intravenous contrast.    FINDINGS:  Joint effusion: Trace joint effusion.    Medial Compartment:  Ulnar collateral ligament is intact. Common flexor tendon is  intact. Medial epicondyle is intact without fracture or edema.    Lateral Compartment:  Radial collateral ligament is intact. Lateral ulnar collateral  ligament is intact.  Common extensor tendon grossly intact with  very small amount of thin curvilinear increased T2 signal at its  lateral epicondylar insertion. Lateral epicondyle is intact  without fracture or edema.    Posterior Compartment:  Triceps is intact. Olecranon is intact without fracture or edema.  No bursitis.    Anterior compartment:  Biceps is intact. Brachialis is intact. No bicipitoradial  bursitis.    Articulations:  Radio-capitellar joint is normal. Ulno-humeral joint is normal.  Proximal radio-ulnar joint is normal.    Miscellaneous:  Bone marrow signal is normal. Muscles are normal in bulk and  signal. Neurovascular bundles are within normal limits. Cubital  tunnel is normal. Retinaculum is intact.  Impression: Strain versus healing partial tear of the common extensor tendon  without tear.    Trace elbow joint effusion.    Electronically signed by:  Viviana Doll MD  12/5/2022  3:55 PM [facility] Workstation: [id number]    @Send Word Now@  Immunization History   Administered Date(s) Administered   • FluLaval/Fluzone >6mos 11/13/2018   • Fluzone Quad >6mos (Multi-dose) 11/10/2017   • Pneumococcal Conjugate 13-Valent (PCV13) 07/17/2018   • Pneumococcal Polysaccharide (PPSV23) 05/11/2016, 01/09/2017       The following portions of the patient's history were reviewed and updated as appropriate: allergies, current medications, past family history, past medical history, past social  "history, past surgical history and problem list.        Physical Exam  /82 (BP Location: Left arm)   Pulse 74   Ht 172.7 cm (68\")   Wt 101 kg (223 lb)   SpO2 95%   BMI 33.91 kg/m²     Physical Exam  Vitals and nursing note reviewed.   Constitutional:       Appearance: He is well-developed. He is not diaphoretic.   HENT:      Head: Normocephalic and atraumatic.      Right Ear: External ear normal.   Eyes:      Conjunctiva/sclera: Conjunctivae normal.      Pupils: Pupils are equal, round, and reactive to light.   Cardiovascular:      Rate and Rhythm: Normal rate and regular rhythm.      Heart sounds: Normal heart sounds. No murmur heard.  Pulmonary:      Effort: Pulmonary effort is normal. No respiratory distress.      Breath sounds: Normal breath sounds.   Abdominal:      General: Bowel sounds are normal. There is no distension.      Palpations: Abdomen is soft.      Tenderness: There is abdominal tenderness. Positive signs include Jang's sign.      Comments: Obese abdomen     Right upper quadrant mass possible lipoma    Musculoskeletal:         General: Tenderness present. No deformity.      Right forearm: Tenderness and bony tenderness present.      Cervical back: Normal range of motion and neck supple.      Right foot: Decreased range of motion. Tenderness and bony tenderness present.      Left foot: Decreased range of motion. Tenderness present.      Comments: Right antecubital pain medially. Tenderness on palpation    Skin:     General: Skin is warm.      Coloration: Skin is not pale.      Findings: No erythema or rash.   Neurological:      Mental Status: He is alert and oriented to person, place, and time.      Cranial Nerves: No cranial nerve deficit.   Psychiatric:         Behavior: Behavior normal.         [unfilled]   Diagnosis Plan   1. Essential hypertension        2. Crohn's disease of small intestine with complication (HCC)        3. Gastroesophageal reflux disease with esophagitis " without hemorrhage        4. Hiatal hernia        5. Tobacco user        6. Obstructive sleep apnea syndrome        7. Other migraine without status migrainosus, not intractable        8. Partial tear of common extensor tendon of elbow        9. Stage 2 chronic kidney disease                -recommend labwork   -recommend pneumonia vaccination  -recommend COVID-19 vaccination  -recommend influenza vaccination    -mass on Right upper abdomen/RUQ pain  - get US of abdomen. Along with US of soft tissue Concerned for  RUQ and gallstones.  Possible lipoma. Information provided today. Consider HIDA scan   -right foream pain/antecubital pain/common extensor tear of elbow/right medial epicondylitis - Orthopedic following, has had PT/OT, local injections, and prednisone  -abnormal thyroid function - repeat studies   -bilateral foot pain/osteoarthritis of both feet - reviewed x-ray has achilles bone spur. Referred to Podiatry   -HTN - stable on norvasc 5 mg daily.   -HLP - recommend heart health ydiet   -N/V - zofran  8mg PO every 8 hours PRN    -high risk medication use / pain on right side -  PM following on neurontin and Percocet   -CKD stage 2- Nephrology following   -Crohn's disease/GERD  - GI following was  on lialda 4800 mg PO qam on nexium 40 mg daily.   -migraine headaches - was on ubrelvy and did not help. Restart on maxalt  But also gave samples of nurtech   -obesity - counseled weight loss >5 minutes BMI at 33.91   -tobacco user - counseled quit smoking >5 minutes recommend 1 800 QUIT NOW. Start on nicotine patch  -advised pt to be safe and call with questions and concerns  -advised pt to go to ER or call 911 if symptoms worrisome or severe  -advised pt to followup with specialist and referrals  -advised pt to be safe during COVID-19 pandemic  I spent 34 minutes caring for Michel on this date of service. This time includes time spent by me in the following activities: preparing for the visit, reviewing tests,  obtaining and/or reviewing a separately obtained history, performing a medically appropriate examination and/or evaluation, counseling and educating the patient/family/caregiver, ordering medications, tests, or procedures, referring and communicating with other health care professionals, documenting information in the medical record, independently interpreting results and communicating that information with the patient/family/caregiver and care coordination.         This document has been electronically signed by Dru Vicente MD on March 30, 2023 09:13 CDT

## 2023-03-22 PROBLEM — N18.2 STAGE 2 CHRONIC KIDNEY DISEASE: Status: ACTIVE | Noted: 2023-03-22

## 2023-03-22 RX ORDER — ERGOCALCIFEROL 1.25 MG/1
CAPSULE ORAL
Qty: 5 CAPSULE | Refills: 3 | Status: SHIPPED | OUTPATIENT
Start: 2023-03-22

## 2023-03-30 ENCOUNTER — OFFICE VISIT (OUTPATIENT)
Dept: FAMILY MEDICINE CLINIC | Facility: CLINIC | Age: 43
End: 2023-03-30
Payer: COMMERCIAL

## 2023-03-30 ENCOUNTER — LAB (OUTPATIENT)
Dept: LAB | Facility: HOSPITAL | Age: 43
End: 2023-03-30
Payer: COMMERCIAL

## 2023-03-30 VITALS
SYSTOLIC BLOOD PRESSURE: 120 MMHG | HEART RATE: 74 BPM | OXYGEN SATURATION: 95 % | DIASTOLIC BLOOD PRESSURE: 82 MMHG | BODY MASS INDEX: 33.8 KG/M2 | WEIGHT: 223 LBS | HEIGHT: 68 IN

## 2023-03-30 DIAGNOSIS — R10.11 RUQ PAIN: Primary | ICD-10-CM

## 2023-03-30 DIAGNOSIS — G47.33 OBSTRUCTIVE SLEEP APNEA SYNDROME: ICD-10-CM

## 2023-03-30 DIAGNOSIS — Z72.0 TOBACCO USER: ICD-10-CM

## 2023-03-30 DIAGNOSIS — N18.2 STAGE 2 CHRONIC KIDNEY DISEASE: ICD-10-CM

## 2023-03-30 DIAGNOSIS — G43.809 OTHER MIGRAINE WITHOUT STATUS MIGRAINOSUS, NOT INTRACTABLE: ICD-10-CM

## 2023-03-30 DIAGNOSIS — R73.01 IMPAIRED FASTING GLUCOSE: ICD-10-CM

## 2023-03-30 DIAGNOSIS — R19.01 RIGHT UPPER QUADRANT ABDOMINAL MASS: ICD-10-CM

## 2023-03-30 DIAGNOSIS — I10 ESSENTIAL HYPERTENSION: ICD-10-CM

## 2023-03-30 DIAGNOSIS — R10.11 RUQ PAIN: ICD-10-CM

## 2023-03-30 DIAGNOSIS — K50.019 CROHN'S DISEASE OF SMALL INTESTINE WITH COMPLICATION: ICD-10-CM

## 2023-03-30 DIAGNOSIS — E66.9 CLASS 1 OBESITY WITH SERIOUS COMORBIDITY AND BODY MASS INDEX (BMI) OF 33.0 TO 33.9 IN ADULT, UNSPECIFIED OBESITY TYPE: ICD-10-CM

## 2023-03-30 DIAGNOSIS — S56.519A PARTIAL TEAR OF COMMON EXTENSOR TENDON OF ELBOW: ICD-10-CM

## 2023-03-30 DIAGNOSIS — K21.00 GASTROESOPHAGEAL REFLUX DISEASE WITH ESOPHAGITIS WITHOUT HEMORRHAGE: ICD-10-CM

## 2023-03-30 DIAGNOSIS — K44.9 HIATAL HERNIA: ICD-10-CM

## 2023-03-30 LAB
ALBUMIN SERPL-MCNC: 4.2 G/DL (ref 3.5–5.2)
ALBUMIN/GLOB SERPL: 1.4 G/DL
ALP SERPL-CCNC: 77 U/L (ref 39–117)
ALT SERPL W P-5'-P-CCNC: 23 U/L (ref 1–41)
AMYLASE SERPL-CCNC: 76 U/L (ref 28–100)
ANION GAP SERPL CALCULATED.3IONS-SCNC: 10 MMOL/L (ref 5–15)
AST SERPL-CCNC: 16 U/L (ref 1–40)
BASOPHILS # BLD AUTO: 0.08 10*3/MM3 (ref 0–0.2)
BASOPHILS NFR BLD AUTO: 0.9 % (ref 0–1.5)
BILIRUB SERPL-MCNC: 0.2 MG/DL (ref 0–1.2)
BUN SERPL-MCNC: 19 MG/DL (ref 6–20)
BUN/CREAT SERPL: 17.4 (ref 7–25)
CALCIUM SPEC-SCNC: 9.4 MG/DL (ref 8.6–10.5)
CHLORIDE SERPL-SCNC: 104 MMOL/L (ref 98–107)
CHOLEST SERPL-MCNC: 197 MG/DL (ref 0–200)
CO2 SERPL-SCNC: 28 MMOL/L (ref 22–29)
CREAT SERPL-MCNC: 1.09 MG/DL (ref 0.76–1.27)
DEPRECATED RDW RBC AUTO: 42.5 FL (ref 37–54)
EGFRCR SERPLBLD CKD-EPI 2021: 85.8 ML/MIN/1.73
EOSINOPHIL # BLD AUTO: 0.29 10*3/MM3 (ref 0–0.4)
EOSINOPHIL NFR BLD AUTO: 3.1 % (ref 0.3–6.2)
ERYTHROCYTE [DISTWIDTH] IN BLOOD BY AUTOMATED COUNT: 12.8 % (ref 12.3–15.4)
GLOBULIN UR ELPH-MCNC: 2.9 GM/DL
GLUCOSE SERPL-MCNC: 85 MG/DL (ref 65–99)
HBA1C MFR BLD: 5.2 % (ref 4.8–5.6)
HCT VFR BLD AUTO: 45 % (ref 37.5–51)
HDLC SERPL-MCNC: 43 MG/DL (ref 40–60)
HGB BLD-MCNC: 15.9 G/DL (ref 13–17.7)
IMM GRANULOCYTES # BLD AUTO: 0.04 10*3/MM3 (ref 0–0.05)
IMM GRANULOCYTES NFR BLD AUTO: 0.4 % (ref 0–0.5)
LDLC SERPL CALC-MCNC: 133 MG/DL (ref 0–100)
LDLC/HDLC SERPL: 3.04 {RATIO}
LIPASE SERPL-CCNC: 44 U/L (ref 13–60)
LYMPHOCYTES # BLD AUTO: 2.57 10*3/MM3 (ref 0.7–3.1)
LYMPHOCYTES NFR BLD AUTO: 27.7 % (ref 19.6–45.3)
MCH RBC QN AUTO: 31.9 PG (ref 26.6–33)
MCHC RBC AUTO-ENTMCNC: 35.3 G/DL (ref 31.5–35.7)
MCV RBC AUTO: 90.2 FL (ref 79–97)
MONOCYTES # BLD AUTO: 0.68 10*3/MM3 (ref 0.1–0.9)
MONOCYTES NFR BLD AUTO: 7.3 % (ref 5–12)
NEUTROPHILS NFR BLD AUTO: 5.61 10*3/MM3 (ref 1.7–7)
NEUTROPHILS NFR BLD AUTO: 60.6 % (ref 42.7–76)
NRBC BLD AUTO-RTO: 0 /100 WBC (ref 0–0.2)
PLATELET # BLD AUTO: 308 10*3/MM3 (ref 140–450)
PMV BLD AUTO: 10.2 FL (ref 6–12)
POTASSIUM SERPL-SCNC: 4.9 MMOL/L (ref 3.5–5.2)
PROT SERPL-MCNC: 7.1 G/DL (ref 6–8.5)
RBC # BLD AUTO: 4.99 10*6/MM3 (ref 4.14–5.8)
SODIUM SERPL-SCNC: 142 MMOL/L (ref 136–145)
TRIGL SERPL-MCNC: 116 MG/DL (ref 0–150)
VLDLC SERPL-MCNC: 21 MG/DL (ref 5–40)
WBC NRBC COR # BLD: 9.27 10*3/MM3 (ref 3.4–10.8)

## 2023-03-30 PROCEDURE — 83690 ASSAY OF LIPASE: CPT

## 2023-03-30 PROCEDURE — 83036 HEMOGLOBIN GLYCOSYLATED A1C: CPT

## 2023-03-30 PROCEDURE — 80053 COMPREHEN METABOLIC PANEL: CPT

## 2023-03-30 PROCEDURE — 80061 LIPID PANEL: CPT

## 2023-03-30 PROCEDURE — 36415 COLL VENOUS BLD VENIPUNCTURE: CPT

## 2023-03-30 PROCEDURE — 82150 ASSAY OF AMYLASE: CPT

## 2023-03-30 PROCEDURE — 85025 COMPLETE CBC W/AUTO DIFF WBC: CPT

## 2023-03-30 RX ORDER — MESALAMINE 375 MG/1
CAPSULE, EXTENDED RELEASE ORAL
COMMUNITY
Start: 2023-03-01

## 2023-03-30 RX ORDER — RIZATRIPTAN BENZOATE 10 MG/1
10 TABLET ORAL ONCE AS NEEDED
Qty: 10 TABLET | Refills: 3 | Status: SHIPPED | OUTPATIENT
Start: 2023-03-30

## 2023-03-30 RX ORDER — ONDANSETRON 8 MG/1
8 TABLET, ORALLY DISINTEGRATING ORAL EVERY 8 HOURS PRN
Qty: 90 TABLET | Refills: 3 | Status: SHIPPED | OUTPATIENT
Start: 2023-03-30

## 2023-03-30 RX ORDER — NICOTINE 21 MG/24HR
1 PATCH, TRANSDERMAL 24 HOURS TRANSDERMAL EVERY 24 HOURS
Qty: 30 PATCH | Refills: 11 | Status: SHIPPED | OUTPATIENT
Start: 2023-03-30

## 2023-03-30 RX ORDER — AMLODIPINE BESYLATE 5 MG/1
5 TABLET ORAL DAILY
Qty: 30 TABLET | Refills: 3 | Status: SHIPPED | OUTPATIENT
Start: 2023-03-30

## 2023-03-30 RX ORDER — RIZATRIPTAN BENZOATE 10 MG/1
TABLET ORAL
COMMUNITY
Start: 2023-03-27 | End: 2023-03-30 | Stop reason: SDUPTHER

## 2023-03-30 NOTE — PATIENT INSTRUCTIONS
Akiko  to replace maxalt    Get labwork before      Get US of abdomen   for possible gallstones and also mass on right upper abdomen.  If normal consider JON gan    Recheck in 4 weeks

## 2023-03-31 NOTE — PROGRESS NOTES
Subjective:  Michel Harmon is a 42 y.o. male who presents for       Patient Active Problem List   Diagnosis   • Crohn's disease of small intestine with complication   • Hiatal hernia   • Long-term use of immunosuppressant medication   • Obstructive sleep apnea syndrome   • Pancreatitis   • Perforation of small intestine   • Tobacco dependence   • Chronic migraine   • Obesity (BMI 30-39.9)   • Nausea and vomiting   • Gastroesophageal reflux disease with esophagitis   • Diarrhea   • Migraine   • High risk medication use   • Stage 3a chronic kidney disease   • Elevated blood pressure reading   • Right lower quadrant abdominal pain   • Tobacco user   • Primary osteoarthritis of both feet   • Bilateral foot pain   • Abnormal results of thyroid function studies   • Right forearm pain   • Essential hypertension   • Class 1 obesity with serious comorbidity and body mass index (BMI) of 34.0 to 34.9 in adult   • Partial tear of common extensor tendon of elbow   • Stage 2 chronic kidney disease   • Class 1 obesity with serious comorbidity and body mass index (BMI) of 33.0 to 33.9 in adult   • Right upper quadrant abdominal mass   • RUQ pain   • Class 1 obesity due to excess calories with serious comorbidity and body mass index (BMI) of 34.0 to 34.9 in adult           Current Outpatient Medications:   •  amLODIPine (NORVASC) 5 MG tablet, Take 1 tablet by mouth Daily., Disp: 30 tablet, Rfl: 3  •  Apriso 0.375 g 24 hr capsule, TAKE 4 CAPSULES DAILY., Disp: 120 capsule, Rfl: 2  •  Aspirin-Salicylamide-Caffeine (BC HEADACHE POWDER PO), Take  by mouth 2 (Two) Times a Day As Needed., Disp: , Rfl:   •  cyclobenzaprine (FLEXERIL) 5 MG tablet, Take 1 tablet by mouth 3 (Three) Times a Day As Needed., Disp: , Rfl:   •  esomeprazole (nexIUM) 40 MG capsule, Take 1 capsule by mouth Every Morning Before Breakfast., Disp: 30 capsule, Rfl: 3  •  gabapentin (NEURONTIN) 400 MG capsule, , Disp: , Rfl:   •  glycopyrrolate (ROBINUL) 2 MG  tablet, Take 1 tablet by mouth 2 (Two) Times a Day., Disp: 60 tablet, Rfl: 2  •  melatonin 5 MG tablet tablet, Take 1 tablet by mouth As Needed., Disp: , Rfl:   •  nicotine (Nicoderm CQ) 21 MG/24HR patch, Place 1 patch on the skin as directed by provider Daily., Disp: 30 patch, Rfl: 11  •  ondansetron ODT (Zofran ODT) 8 MG disintegrating tablet, Place 1 tablet on the tongue Every 8 (Eight) Hours As Needed for Nausea or Vomiting., Disp: 90 tablet, Rfl: 3  •  oxyCODONE-acetaminophen (PERCOCET)  MG per tablet, Take 1 tablet by mouth 3 (Three) Times a Day., Disp: , Rfl:   •  vitamin D (ERGOCALCIFEROL) 1.25 MG (66032 UT) capsule capsule, Take 1 capsule by mouth 1 (One) Time Per Week., Disp: 5 capsule, Rfl: 3  •  Rimegepant Sulfate (Nurtec) 75 MG tablet dispersible tablet, Take 1 tablet by mouth Daily As Needed (migraine headache)., Disp: 8 tablet, Rfl: 3    Pt is 43 yo male with management of obesity, crohn's disease, GERD with esophagitis. History of pancreatitis , insomnia, migrained headaches. Obesity history of perforation of small intestines, Nausea/vomiting, hiatal hernia, KEESHA, tobacco user, immunocopromised, high risk medication use, sp ileum surgery., HLP, partial tear of common extensor tendon of elbow, medial epicondylitis of right elbow, CKD stage 2    1/27/23 in office visit for recheck. Pt saw Podiatry on 11/22/22 for his bilateral foot pain and plantar fascitis,  He was recommend OTC insole and stretching exercises.  He also saw GI on 1/20/23 for his Crohn's disease. Pt has problabl chronic ileitis,  He will be recheck in 6 months. He has yet to get labwork ordered on 10/13/22. Pt did have MRI of radius/ulna on 12/5/22 that showed strain vs healing partial tear of the common extensor tendon without tear.  Pt was referred to Orthopedic. BP is stable.   He continues to take  maxalt for his headaches. He averages about 2 headaches a month. They are located frontal.  He does have sensitivity to light      3/30/23 in office visit for recheck. Pt has seen Orthopedic on 2/20/23 and 2/6/23 for his right arm pain/medial epicondylitis of right elbow and partial tear of common extensor tendon. His arm pain ahs improved with prednisone and localized injections and physical therapy. Pt had labwork done on 2/20/23 that showed normal thyroid studies. Lipid panel  LDL at 135. CMP showed gl;ucose at 113 GFR at 85.8 and CBC showed normal hemoglobin and wBC. His right forearm is better and less pain. He states that ubrevly did not work for his migraines. He would like to try maxalt again. He continues to smoke 1 ppd of tobacco. His insurance did not cover nicotrol. He also reports a mass or bulge on his RUQ for about 2 months he also has pain when he eats. He does get nausea.       4/27/23 in office visit for recheck. Pt had labwork on 3/30/23 that showed normal amylase.lipase, hga1c normal lipid panel showed LDL at 133 from 135. CBC normal CMP showed stable kidney function and liver function. He was ordered US of abdomen and US of soft tissue for his upper abdominal mass and RUQ pain. His insurance would not cover US of abdomen. He reports no abdominal pain currently. He has appt tomorrow with Pain Management. No nausea or vomiting. He has cut back on smoking. No jaundice no fever. Hi migraine headaches have improved with nurtech and it does help get rid of headaches.               Nicotine Dependence  Presents for follow-up visit. Symptoms include fatigue. Symptoms are negative for sore throat. The symptoms have been stable. He smokes 1 pack of cigarettes per day. Compliance with prior treatments has been variable.   Migraine  Headache pattern:  Headache sometimes there, sometimes not at all  Initial event:  None  Recent changes:  Headaches last longer than they used to  Frequency:  1 to 3 per month  Providers seen:  None  Lifetime total:  20+  Number of ER visits for headache:  0  Time of day symptoms are worse:  No specific  time of day  Do headaches wake patient from sleep?: No    Season symptoms are worse:  No particular season  Laterality:  Unable to describe  Aggravating factors:  Light  Changes in thinking and mood:  Fatigue  Abortive medication frequency of use:  Less than 1 day a week  Abortive medications tried:  Rizatriptan  Vitamins and supplements tried:  None  Arm Pain   The incident occurred more than 6 months   ago. The incident occurred at home. The injury mechanism is unknown (lifting heavy object ). The pain is present in the right forearm (right antecubital space ). The quality of the pain is described as aching. The pain does not radiate. The pain is at a severity of 8/10. The pain is moderate. Associated symptoms include numbness. Pertinent negatives include no chest pain. The symptoms are aggravated by movement and lifting. And turning his right arm inward  He has tried nothing for the symptoms. The treatment provided no relief.   Abdominal Pain  This is a chronic problem. The current episode started more than 1 month ago. The onset quality is sudden. The problem occurs constantly. The problem has been unchanged. The pain is located in the RLQ. The pain is at a severity of 4/10. The pain is moderate. The quality of the pain is aching. The abdominal pain radiates to the RLQ. Associated symptoms include arthralgias and headaches. Pertinent negatives include no anorexia, belching, constipation, diarrhea, dysuria, fever, flatus, frequency, hematochezia, hematuria, melena, myalgias, nausea or vomiting. Nothing aggravates the pain. The pain is relieved by nothing. His past medical history is significant for Crohn's disease. There is no history of abdominal surgery, colon cancer, gallstones, GERD, irritable bowel syndrome, pancreatitis, PUD or ulcerative colitis.   GI Problem  The primary symptoms include fatigue, abdominal pain and arthralgias. Primary symptoms do not include fever, nausea, vomiting or diarrhea.    Obesity  This is a chronic problem. The current episode started more than 1 year ago. The problem occurs constantly. Associated symptoms include abdominal pain, arthralgias, fatigue, headaches, numbness and weakness. Pertinent negatives include no chest pain, chills, congestion, coughing, diaphoresis, fever, nausea, sore throat or vomiting. Nothing aggravates the symptoms. He has tried nothing for the symptoms. The treatment provided no relief      Review of Systems  Review of Systems   Constitutional: Positive for activity change and fatigue. Negative for appetite change, chills, diaphoresis and fever.   HENT: Negative for congestion, postnasal drip, rhinorrhea, sinus pressure, sinus pain, sneezing, sore throat, trouble swallowing and voice change.    Respiratory: Negative for cough, choking, chest tightness, shortness of breath, wheezing and stridor.    Cardiovascular: Negative for chest pain.   Gastrointestinal: Positive for abdominal pain. Negative for diarrhea, nausea and vomiting.   Musculoskeletal: Positive for arthralgias.   Neurological: Positive for weakness and headaches.       Patient Active Problem List   Diagnosis   • Crohn's disease of small intestine with complication   • Hiatal hernia   • Long-term use of immunosuppressant medication   • Obstructive sleep apnea syndrome   • Pancreatitis   • Perforation of small intestine   • Tobacco dependence   • Chronic migraine   • Obesity (BMI 30-39.9)   • Nausea and vomiting   • Gastroesophageal reflux disease with esophagitis   • Diarrhea   • Migraine   • High risk medication use   • Stage 3a chronic kidney disease   • Elevated blood pressure reading   • Right lower quadrant abdominal pain   • Tobacco user   • Primary osteoarthritis of both feet   • Bilateral foot pain   • Abnormal results of thyroid function studies   • Right forearm pain   • Essential hypertension   • Class 1 obesity with serious comorbidity and body mass index (BMI) of 34.0 to 34.9 in  adult   • Partial tear of common extensor tendon of elbow   • Stage 2 chronic kidney disease   • Class 1 obesity with serious comorbidity and body mass index (BMI) of 33.0 to 33.9 in adult   • Right upper quadrant abdominal mass   • RUQ pain   • Class 1 obesity due to excess calories with serious comorbidity and body mass index (BMI) of 34.0 to 34.9 in adult     Past Surgical History:   Procedure Laterality Date   • COLON SURGERY  2015   • COLONOSCOPY     • COLONOSCOPY N/A 10/07/2020    Procedure: COLONOSCOPY--look TI;  Surgeon: Wisam Carpio MD;  Location: [facility] ENDOSCOPY;  Service: Gastroenterology;  Laterality: N/A;   • ENDOSCOPY N/A 10/07/2020    Procedure: ESOPHAGOGASTRODUODENOSCOPY--bx;  Surgeon: Wisam Carpio MD;  Location: [facility] ENDOSCOPY;  Service: Gastroenterology;  Laterality: N/A;   • HERNIA REPAIR  1981   • UPPER GASTROINTESTINAL ENDOSCOPY  10/07/2020     Social History     Socioeconomic History   • Marital status:    Tobacco Use   • Smoking status: Every Day     Packs/day: 1.00     Years: 26.00     Pack years: 26.00     Types: Cigarettes     Start date: 1/1/1996   • Smokeless tobacco: Never   Vaping Use   • Vaping Use: Never used   Substance and Sexual Activity   • Alcohol use: Not Currently   • Drug use: Never   • Sexual activity: Yes     Partners: Female     Birth control/protection: None     Comment: We're old     Family History   Problem Relation Age of Onset   • Breast cancer Other    • Brain cancer Other    • Ulcers Other    • Bleeding Disorder Other    • Anxiety disorder Sister    • Mental illness Sister         Involuntary commitment mid 90s   • Cancer Maternal Aunt         Breast cancer then brain cancer     Lab on 03/30/2023   Component Date Value Ref Range Status   • WBC 03/30/2023 9.27  3.40 - 10.80 10*3/mm3 Final   • RBC 03/30/2023 4.99  4.14 - 5.80 10*6/mm3 Final   • Hemoglobin 03/30/2023 15.9  13.0 - 17.7 g/dL Final   • Hematocrit 03/30/2023 45.0  37.5 - 51.0 % Final   •  MCV 03/30/2023 90.2  79.0 - 97.0 fL Final   • MCH 03/30/2023 31.9  26.6 - 33.0 pg Final   • MCHC 03/30/2023 35.3  31.5 - 35.7 g/dL Final   • RDW 03/30/2023 12.8  12.3 - 15.4 % Final   • RDW-SD 03/30/2023 42.5  37.0 - 54.0 fl Final   • MPV 03/30/2023 10.2  6.0 - 12.0 fL Final   • Platelets 03/30/2023 308  140 - 450 10*3/mm3 Final   • Neutrophil % 03/30/2023 60.6  42.7 - 76.0 % Final   • Lymphocyte % 03/30/2023 27.7  19.6 - 45.3 % Final   • Monocyte % 03/30/2023 7.3  5.0 - 12.0 % Final   • Eosinophil % 03/30/2023 3.1  0.3 - 6.2 % Final   • Basophil % 03/30/2023 0.9  0.0 - 1.5 % Final   • Immature Grans % 03/30/2023 0.4  0.0 - 0.5 % Final   • Neutrophils, Absolute 03/30/2023 5.61  1.70 - 7.00 10*3/mm3 Final   • Lymphocytes, Absolute 03/30/2023 2.57  0.70 - 3.10 10*3/mm3 Final   • Monocytes, Absolute 03/30/2023 0.68  0.10 - 0.90 10*3/mm3 Final   • Eosinophils, Absolute 03/30/2023 0.29  0.00 - 0.40 10*3/mm3 Final   • Basophils, Absolute 03/30/2023 0.08  0.00 - 0.20 10*3/mm3 Final   • Immature Grans, Absolute 03/30/2023 0.04  0.00 - 0.05 10*3/mm3 Final   • nRBC 03/30/2023 0.0  0.0 - 0.2 /100 WBC Final   • Glucose 03/30/2023 85  65 - 99 mg/dL Final   • BUN 03/30/2023 19  6 - 20 mg/dL Final   • Creatinine 03/30/2023 1.09  0.76 - 1.27 mg/dL Final   • Sodium 03/30/2023 142  136 - 145 mmol/L Final   • Potassium 03/30/2023 4.9  3.5 - 5.2 mmol/L Final   • Chloride 03/30/2023 104  98 - 107 mmol/L Final   • CO2 03/30/2023 28.0  22.0 - 29.0 mmol/L Final   • Calcium 03/30/2023 9.4  8.6 - 10.5 mg/dL Final   • Total Protein 03/30/2023 7.1  6.0 - 8.5 g/dL Final   • Albumin 03/30/2023 4.2  3.5 - 5.2 g/dL Final   • ALT (SGPT) 03/30/2023 23  1 - 41 U/L Final   • AST (SGOT) 03/30/2023 16  1 - 40 U/L Final   • Alkaline Phosphatase 03/30/2023 77  39 - 117 U/L Final   • Total Bilirubin 03/30/2023 0.2  0.0 - 1.2 mg/dL Final   • Globulin 03/30/2023 2.9  gm/dL Final   • A/G Ratio 03/30/2023 1.4  g/dL Final   • BUN/Creatinine Ratio 03/30/2023  17.4  7.0 - 25.0 Final   • Anion Gap 03/30/2023 10.0  5.0 - 15.0 mmol/L Final   • eGFR 03/30/2023 85.8  >60.0 mL/min/1.73 Final   • Total Cholesterol 03/30/2023 197  0 - 200 mg/dL Final   • Triglycerides 03/30/2023 116  0 - 150 mg/dL Final   • HDL Cholesterol 03/30/2023 43  40 - 60 mg/dL Final   • LDL Cholesterol  03/30/2023 133 (H)  0 - 100 mg/dL Final   • VLDL Cholesterol 03/30/2023 21  5 - 40 mg/dL Final   • LDL/HDL Ratio 03/30/2023 3.04   Final   • Hemoglobin A1C 03/30/2023 5.20  4.80 - 5.60 % Final   • Amylase 03/30/2023 76  28 - 100 U/L Final   • Lipase 03/30/2023 44  13 - 60 U/L Final   Lab on 02/20/2023   Component Date Value Ref Range Status   • WBC 02/20/2023 8.81  3.40 - 10.80 10*3/mm3 Final   • RBC 02/20/2023 5.15  4.14 - 5.80 10*6/mm3 Final   • Hemoglobin 02/20/2023 16.6  13.0 - 17.7 g/dL Final   • Hematocrit 02/20/2023 47.0  37.5 - 51.0 % Final   • MCV 02/20/2023 91.3  79.0 - 97.0 fL Final   • MCH 02/20/2023 32.2  26.6 - 33.0 pg Final   • MCHC 02/20/2023 35.3  31.5 - 35.7 g/dL Final   • RDW 02/20/2023 13.0  12.3 - 15.4 % Final   • RDW-SD 02/20/2023 43.9  37.0 - 54.0 fl Final   • MPV 02/20/2023 10.1  6.0 - 12.0 fL Final   • Platelets 02/20/2023 255  140 - 450 10*3/mm3 Final   • Neutrophil % 02/20/2023 64.1  42.7 - 76.0 % Final   • Lymphocyte % 02/20/2023 25.5  19.6 - 45.3 % Final   • Monocyte % 02/20/2023 6.8  5.0 - 12.0 % Final   • Eosinophil % 02/20/2023 2.5  0.3 - 6.2 % Final   • Basophil % 02/20/2023 0.6  0.0 - 1.5 % Final   • Immature Grans % 02/20/2023 0.5  0.0 - 0.5 % Final   • Neutrophils, Absolute 02/20/2023 5.65  1.70 - 7.00 10*3/mm3 Final   • Lymphocytes, Absolute 02/20/2023 2.25  0.70 - 3.10 10*3/mm3 Final   • Monocytes, Absolute 02/20/2023 0.60  0.10 - 0.90 10*3/mm3 Final   • Eosinophils, Absolute 02/20/2023 0.22  0.00 - 0.40 10*3/mm3 Final   • Basophils, Absolute 02/20/2023 0.05  0.00 - 0.20 10*3/mm3 Final   • Immature Grans, Absolute 02/20/2023 0.04  0.00 - 0.05 10*3/mm3 Final   • nRBC  02/20/2023 0.0  0.0 - 0.2 /100 WBC Final   • Glucose 02/20/2023 113 (H)  65 - 99 mg/dL Final   • BUN 02/20/2023 17  6 - 20 mg/dL Final   • Creatinine 02/20/2023 1.09  0.76 - 1.27 mg/dL Final   • Sodium 02/20/2023 140  136 - 145 mmol/L Final   • Potassium 02/20/2023 4.6  3.5 - 5.2 mmol/L Final   • Chloride 02/20/2023 105  98 - 107 mmol/L Final   • CO2 02/20/2023 26.0  22.0 - 29.0 mmol/L Final   • Calcium 02/20/2023 9.0  8.6 - 10.5 mg/dL Final   • Total Protein 02/20/2023 6.9  6.0 - 8.5 g/dL Final   • Albumin 02/20/2023 4.3  3.5 - 5.2 g/dL Final   • ALT (SGPT) 02/20/2023 24  1 - 41 U/L Final   • AST (SGOT) 02/20/2023 15  1 - 40 U/L Final   • Alkaline Phosphatase 02/20/2023 78  39 - 117 U/L Final   • Total Bilirubin 02/20/2023 0.3  0.0 - 1.2 mg/dL Final   • Globulin 02/20/2023 2.6  gm/dL Final   • A/G Ratio 02/20/2023 1.7  g/dL Final   • BUN/Creatinine Ratio 02/20/2023 15.6  7.0 - 25.0 Final   • Anion Gap 02/20/2023 9.0  5.0 - 15.0 mmol/L Final   • eGFR 02/20/2023 85.8  >60.0 mL/min/1.73 Final   • Total Cholesterol 02/20/2023 202 (H)  0 - 200 mg/dL Final   • Triglycerides 02/20/2023 137  0 - 150 mg/dL Final   • HDL Cholesterol 02/20/2023 42  40 - 60 mg/dL Final   • LDL Cholesterol  02/20/2023 135 (H)  0 - 100 mg/dL Final   • VLDL Cholesterol 02/20/2023 25  5 - 40 mg/dL Final   • LDL/HDL Ratio 02/20/2023 3.16   Final   • TSH 02/20/2023 2.360  0.270 - 4.200 uIU/mL Final   • Free T4 02/20/2023 1.05  0.93 - 1.70 ng/dL Final      MRI Radius Ulna Right Without Contrast  Narrative: EXAM: MR UPPER EXTREMITY WITHOUT IV CONTRAST    HISTORY: 41-year-old male with right upper extremity/forearm  pain. Right-sided elbow pain, fall, pain in side of elbow 6  months prior    COMPARISONS: Radiograph 10/13/2022    TECHNIQUE: Multiplanar, multi sequence MR images were obtained of  the right elbow without the use of intravenous contrast.    FINDINGS:  Joint effusion: Trace joint effusion.    Medial Compartment:  Ulnar collateral ligament  "is intact. Common flexor tendon is  intact. Medial epicondyle is intact without fracture or edema.    Lateral Compartment:  Radial collateral ligament is intact. Lateral ulnar collateral  ligament is intact.  Common extensor tendon grossly intact with  very small amount of thin curvilinear increased T2 signal at its  lateral epicondylar insertion. Lateral epicondyle is intact  without fracture or edema.    Posterior Compartment:  Triceps is intact. Olecranon is intact without fracture or edema.  No bursitis.    Anterior compartment:  Biceps is intact. Brachialis is intact. No bicipitoradial  bursitis.    Articulations:  Radio-capitellar joint is normal. Ulno-humeral joint is normal.  Proximal radio-ulnar joint is normal.    Miscellaneous:  Bone marrow signal is normal. Muscles are normal in bulk and  signal. Neurovascular bundles are within normal limits. Cubital  tunnel is normal. Retinaculum is intact.  Impression: Strain versus healing partial tear of the common extensor tendon  without tear.    Trace elbow joint effusion.    Electronically signed by:  Viviana Doll MD  12/5/2022  3:55 PM [facility] Workstation: [id number]    @[email or web address]@  Immunization History   Administered Date(s) Administered   • FluLaval/Fluzone >6mos 11/13/2018   • Fluzone Quad >6mos (Multi-dose) 11/10/2017   • Pneumococcal Conjugate 13-Valent (PCV13) 07/17/2018   • Pneumococcal Polysaccharide (PPSV23) 05/11/2016, 01/09/2017       The following portions of the patient's history were reviewed and updated as appropriate: allergies, current medications, past family history, past medical history, past social history, past surgical history and problem list.        Physical Exam  /82 (BP Location: Left arm, Patient Position: Sitting, Cuff Size: Large Adult)   Pulse 85   Temp 98.3 °F (36.8 °C)   Ht 172.7 cm (68\")   Wt 103 kg (228 lb)   SpO2 95%   BMI 34.67 kg/m²       Physical Exam  Vitals and nursing note reviewed.   Constitutional: "       Appearance: He is well-developed. He is not diaphoretic.   HENT:      Head: Normocephalic and atraumatic.      Right Ear: External ear normal.   Eyes:      Conjunctiva/sclera: Conjunctivae normal.      Pupils: Pupils are equal, round, and reactive to light.   Cardiovascular:      Rate and Rhythm: Normal rate and regular rhythm.      Heart sounds: Normal heart sounds. No murmur heard.  Pulmonary:      Effort: Pulmonary effort is normal. No respiratory distress.      Breath sounds: Normal breath sounds.   Abdominal:      General: Bowel sounds are normal. There is no distension.      Palpations: Abdomen is soft.      Tenderness: There is abdominal tenderness. Positive signs include Jang's sign.      Comments: Obese abdomen     Right upper quadrant mass possible lipoma    Musculoskeletal:         General: Tenderness present. No deformity.      Right forearm: Tenderness and bony tenderness present.      Cervical back: Normal range of motion and neck supple.      Right foot: Decreased range of motion. Tenderness and bony tenderness present.      Left foot: Decreased range of motion. Tenderness present.      Comments: Right antecubital pain medially. Tenderness on palpation    Skin:     General: Skin is warm.      Coloration: Skin is not pale.      Findings: No erythema or rash.   Neurological:      Mental Status: He is alert and oriented to person, place, and time.      Cranial Nerves: No cranial nerve deficit.   Psychiatric:         Behavior: Behavior normal.         [unfilled]   Diagnosis Plan   1. RUQ pain  US Abdomen Complete      2. Tobacco user        3. Stage 3a chronic kidney disease        4. Right forearm pain        5. Partial tear of common extensor tendon of elbow        6. Other migraine without status migrainosus, not intractable        7. Gastroesophageal reflux disease with esophagitis without hemorrhage        8. Essential hypertension        9. Crohn's disease of small intestine with  complication        10. Class 1 obesity due to excess calories with serious comorbidity and body mass index (BMI) of 34.0 to 34.9 in adult        11. Positive Jang's Sign  US Abdomen Complete              -went over labwork   -recommend pneumonia vaccination  -recommend COVID-19 vaccination  -mass on Right upper abdomen/RUQ pain  - get US of abdomen and reorder at [facility] Along with US of soft tissue Concerned for  RUQ and gallstones.  Possible lipoma. Information provided today. Consider HIDA scan  -right foream pain/antecubital pain/common extensor tear of elbow/right medial epicondylitis - Orthopedic following, has had PT/OT, local injections, and prednisone  -abnormal thyroid function - repeat studies   -bilateral foot pain/osteoarthritis of both feet - reviewed x-ray has achilles bone spur. Referred to Podiatry   -HTN - stable on norvasc 5 mg daily.   -HLP - recommend heart health ydiet   -N/V - zofran  8mg PO every 8 hours PRN    -high risk medication use / pain on right side -  PM following on neurontin and Percocet   -CKD stage 2- Nephrology following   -Crohn's disease/GERD  - GI following was  on lialda 4800 mg PO qam on nexium 40 mg daily.   -migraine headaches - was on ubrelvy and did not help.has failed maxalt and imitrex.   Continue on nurtech 75 mg PO  PRN.   -obesity - counseled weight loss >5 minutes BMI at 34.67   -tobacco user - counseled quit smoking >5 minutes recommend 1 800 QUIT NOW. Start on nicotine patch  -advised pt to be safe and call with questions and concerns  -advised pt to go to ER or call 911 if symptoms worrisome or severe  -advised pt to followup with specialist and referrals  -advised pt to be safe during COVID-19 pandemic  I spent 37 minutes caring for Michel on this date of service. This time includes time spent by me in the following activities: preparing for the visit, reviewing tests, obtaining and/or reviewing a separately obtained history, performing a medically appropriate  examination and/or evaluation, counseling and educating the patient/family/caregiver, ordering medications, tests, or procedures, referring and communicating with other health care professionals, documenting information in the medical record, independently interpreting results and communicating that information with the patient/family/caregiver and care coordination.         This document has been electronically signed by Dru Vicente MD on April 27, 2023 10:59 CDT

## 2023-04-13 RX ORDER — MESALAMINE 375 MG/1
CAPSULE, EXTENDED RELEASE ORAL
Qty: 120 CAPSULE | Refills: 2 | Status: SHIPPED | OUTPATIENT
Start: 2023-04-13 | End: 2023-05-16 | Stop reason: SDUPTHER

## 2023-04-27 ENCOUNTER — OFFICE VISIT (OUTPATIENT)
Dept: FAMILY MEDICINE CLINIC | Facility: CLINIC | Age: 43
End: 2023-04-27
Payer: COMMERCIAL

## 2023-04-27 VITALS
OXYGEN SATURATION: 95 % | BODY MASS INDEX: 34.56 KG/M2 | DIASTOLIC BLOOD PRESSURE: 82 MMHG | HEART RATE: 85 BPM | HEIGHT: 68 IN | TEMPERATURE: 98.3 F | WEIGHT: 228 LBS | SYSTOLIC BLOOD PRESSURE: 124 MMHG

## 2023-04-27 DIAGNOSIS — S56.519A PARTIAL TEAR OF COMMON EXTENSOR TENDON OF ELBOW: ICD-10-CM

## 2023-04-27 DIAGNOSIS — G43.809 OTHER MIGRAINE WITHOUT STATUS MIGRAINOSUS, NOT INTRACTABLE: ICD-10-CM

## 2023-04-27 DIAGNOSIS — M79.631 RIGHT FOREARM PAIN: ICD-10-CM

## 2023-04-27 DIAGNOSIS — I10 ESSENTIAL HYPERTENSION: ICD-10-CM

## 2023-04-27 DIAGNOSIS — R10.11 RUQ PAIN: Primary | ICD-10-CM

## 2023-04-27 DIAGNOSIS — E66.09 CLASS 1 OBESITY DUE TO EXCESS CALORIES WITH SERIOUS COMORBIDITY AND BODY MASS INDEX (BMI) OF 34.0 TO 34.9 IN ADULT: ICD-10-CM

## 2023-04-27 DIAGNOSIS — Z72.0 TOBACCO USER: ICD-10-CM

## 2023-04-27 DIAGNOSIS — K50.019 CROHN'S DISEASE OF SMALL INTESTINE WITH COMPLICATION: ICD-10-CM

## 2023-04-27 DIAGNOSIS — R19.8 POSITIVE MURPHY'S SIGN: ICD-10-CM

## 2023-04-27 DIAGNOSIS — N18.31 STAGE 3A CHRONIC KIDNEY DISEASE: ICD-10-CM

## 2023-04-27 DIAGNOSIS — K21.00 GASTROESOPHAGEAL REFLUX DISEASE WITH ESOPHAGITIS WITHOUT HEMORRHAGE: ICD-10-CM

## 2023-04-27 RX ORDER — ERGOCALCIFEROL 1.25 MG/1
50000 CAPSULE ORAL WEEKLY
Qty: 5 CAPSULE | Refills: 3 | Status: SHIPPED | OUTPATIENT
Start: 2023-04-27

## 2023-04-27 RX ORDER — AMLODIPINE BESYLATE 5 MG/1
5 TABLET ORAL DAILY
Qty: 30 TABLET | Refills: 3 | Status: SHIPPED | OUTPATIENT
Start: 2023-04-27

## 2023-04-27 RX ORDER — ESOMEPRAZOLE MAGNESIUM 40 MG/1
40 CAPSULE, DELAYED RELEASE ORAL
Qty: 30 CAPSULE | Refills: 3 | Status: SHIPPED | OUTPATIENT
Start: 2023-04-27

## 2023-04-27 RX ORDER — ONDANSETRON 8 MG/1
8 TABLET, ORALLY DISINTEGRATING ORAL EVERY 8 HOURS PRN
Qty: 90 TABLET | Refills: 3 | Status: SHIPPED | OUTPATIENT
Start: 2023-04-27

## 2023-04-27 RX ORDER — RIMEGEPANT SULFATE 75 MG/75MG
75 TABLET, ORALLY DISINTEGRATING ORAL DAILY PRN
Qty: 8 TABLET | Refills: 3 | Status: SHIPPED | OUTPATIENT
Start: 2023-04-27

## 2023-04-27 RX ORDER — NICOTINE 21 MG/24HR
1 PATCH, TRANSDERMAL 24 HOURS TRANSDERMAL EVERY 24 HOURS
Qty: 30 PATCH | Refills: 11 | Status: SHIPPED | OUTPATIENT
Start: 2023-04-27

## 2023-05-09 ENCOUNTER — HOSPITAL ENCOUNTER (OUTPATIENT)
Dept: ULTRASOUND IMAGING | Facility: HOSPITAL | Age: 43
Discharge: HOME | End: 2023-05-09
Admitting: FAMILY MEDICINE
Payer: COMMERCIAL

## 2023-05-09 DIAGNOSIS — R19.8 POSITIVE MURPHY'S SIGN: ICD-10-CM

## 2023-05-09 DIAGNOSIS — R10.11 RUQ PAIN: ICD-10-CM

## 2023-05-09 PROCEDURE — 76700 US EXAM ABDOM COMPLETE: CPT

## 2023-05-16 RX ORDER — ESOMEPRAZOLE MAGNESIUM 40 MG/1
40 CAPSULE, DELAYED RELEASE ORAL
Qty: 30 CAPSULE | Refills: 3 | Status: SHIPPED | OUTPATIENT
Start: 2023-05-16

## 2023-05-16 RX ORDER — MESALAMINE 0.38 G/1
1.5 CAPSULE, EXTENDED RELEASE ORAL DAILY
Qty: 120 CAPSULE | Refills: 1 | Status: SHIPPED | OUTPATIENT
Start: 2023-05-16

## 2023-05-16 RX ORDER — ONDANSETRON 8 MG/1
8 TABLET, ORALLY DISINTEGRATING ORAL EVERY 8 HOURS PRN
Qty: 90 TABLET | Refills: 3 | Status: SHIPPED | OUTPATIENT
Start: 2023-05-16

## 2023-08-01 ENCOUNTER — OFFICE VISIT (OUTPATIENT)
Dept: FAMILY MEDICINE CLINIC | Facility: CLINIC | Age: 43
End: 2023-08-01
Payer: COMMERCIAL

## 2023-08-01 VITALS
SYSTOLIC BLOOD PRESSURE: 138 MMHG | BODY MASS INDEX: 34.89 KG/M2 | TEMPERATURE: 98.5 F | HEIGHT: 68 IN | DIASTOLIC BLOOD PRESSURE: 88 MMHG | HEART RATE: 76 BPM | WEIGHT: 230.2 LBS | OXYGEN SATURATION: 97 %

## 2023-08-01 DIAGNOSIS — K21.00 GASTROESOPHAGEAL REFLUX DISEASE WITH ESOPHAGITIS WITHOUT HEMORRHAGE: ICD-10-CM

## 2023-08-01 DIAGNOSIS — I10 ESSENTIAL HYPERTENSION: ICD-10-CM

## 2023-08-01 DIAGNOSIS — G43.809 OTHER MIGRAINE WITHOUT STATUS MIGRAINOSUS, NOT INTRACTABLE: ICD-10-CM

## 2023-08-01 DIAGNOSIS — N18.2 STAGE 2 CHRONIC KIDNEY DISEASE: ICD-10-CM

## 2023-08-01 DIAGNOSIS — R10.11 RUQ PAIN: Primary | ICD-10-CM

## 2023-08-01 DIAGNOSIS — Z72.0 TOBACCO USER: ICD-10-CM

## 2023-08-01 DIAGNOSIS — K50.019 CROHN'S DISEASE OF SMALL INTESTINE WITH COMPLICATION: ICD-10-CM

## 2023-08-01 DIAGNOSIS — Z78.9 NEED FOR NICOTINE REPLACEMENT THERAPY: ICD-10-CM

## 2023-08-01 DIAGNOSIS — K44.9 HIATAL HERNIA: ICD-10-CM

## 2023-08-01 RX ORDER — ONDANSETRON 8 MG/1
8 TABLET, ORALLY DISINTEGRATING ORAL EVERY 8 HOURS PRN
Qty: 90 TABLET | Refills: 3 | Status: SHIPPED | OUTPATIENT
Start: 2023-08-01

## 2023-08-01 RX ORDER — RIMEGEPANT SULFATE 75 MG/75MG
75 TABLET, ORALLY DISINTEGRATING ORAL DAILY PRN
Qty: 8 TABLET | Refills: 3 | Status: SHIPPED | OUTPATIENT
Start: 2023-08-01

## 2023-08-02 ENCOUNTER — PRIOR AUTHORIZATION (OUTPATIENT)
Dept: FAMILY MEDICINE CLINIC | Facility: CLINIC | Age: 43
End: 2023-08-02
Payer: COMMERCIAL

## 2023-08-08 DIAGNOSIS — R10.11 RUQ PAIN: Primary | ICD-10-CM

## 2023-08-14 ENCOUNTER — HOSPITAL ENCOUNTER (OUTPATIENT)
Dept: NUCLEAR MEDICINE | Facility: HOSPITAL | Age: 43
Discharge: HOME | End: 2023-08-14
Payer: COMMERCIAL

## 2023-08-14 ENCOUNTER — TELEPHONE (OUTPATIENT)
Dept: FAMILY MEDICINE CLINIC | Facility: CLINIC | Age: 43
End: 2023-08-14
Payer: COMMERCIAL

## 2023-08-14 DIAGNOSIS — R10.11 RUQ PAIN: ICD-10-CM

## 2023-08-14 PROCEDURE — A9537 TC99M MEBROFENIN: HCPCS | Performed by: FAMILY MEDICINE

## 2023-08-14 PROCEDURE — 78226 HEPATOBILIARY SYSTEM IMAGING: CPT

## 2023-08-14 PROCEDURE — 0 TECHNETIUM TC 99M MEBROFENIN KIT: Performed by: FAMILY MEDICINE

## 2023-08-14 RX ORDER — KIT FOR THE PREPARATION OF TECHNETIUM TC 99M MEBROFENIN 45 MG/10ML
1 INJECTION, POWDER, LYOPHILIZED, FOR SOLUTION INTRAVENOUS
Status: COMPLETED | OUTPATIENT
Start: 2023-08-14 | End: 2023-08-14

## 2023-08-14 RX ADMIN — MEBROFENIN 1 DOSE: 45 INJECTION, POWDER, LYOPHILIZED, FOR SOLUTION INTRAVENOUS at 12:40

## 2023-08-14 NOTE — TELEPHONE ENCOUNTER
----- Message from Dru Vicente MD sent at 8/14/2023  3:33 PM CDT -----  On recent HIDA scan pt has nonvisualization of his gallbladder which suggest a cystic duct obstruction. Highly recommend pt see his Gastroenterologist for this. He will need a diagnostic image called an MRCP for better understanding of the obstruction if any.  This is likely related to his RUQ pain. Please have pt call and give number for Gastroenterology with [facility] for a sooner appt. Thank you

## 2023-08-14 NOTE — TELEPHONE ENCOUNTER
Called pt and they voiced understanding. Pt states they will call Gastroenterology at [facility] to get an appointment as soon as they can.

## 2023-08-16 NOTE — PROGRESS NOTES
Subjective:  Michel Harmon is a 42 y.o. male who presents for       Patient Active Problem List   Diagnosis    Crohn's disease of small intestine with complication    Hiatal hernia    Long-term use of immunosuppressant medication    Obstructive sleep apnea syndrome    Pancreatitis    Perforation of small intestine    Tobacco dependence    Chronic migraine    Obesity (BMI 30-39.9)    Nausea and vomiting    Gastroesophageal reflux disease with esophagitis    Diarrhea    Migraine    High risk medication use    Stage 3a chronic kidney disease    Elevated blood pressure reading    Right lower quadrant abdominal pain    Tobacco user    Primary osteoarthritis of both feet    Bilateral foot pain    Abnormal results of thyroid function studies    Right forearm pain    Essential hypertension    Class 1 obesity with serious comorbidity and body mass index (BMI) of 34.0 to 34.9 in adult    Partial tear of common extensor tendon of elbow    Stage 2 chronic kidney disease    Class 1 obesity with serious comorbidity and body mass index (BMI) of 33.0 to 33.9 in adult    Right upper quadrant abdominal mass    RUQ pain    Class 1 obesity due to excess calories with serious comorbidity and body mass index (BMI) of 34.0 to 34.9 in adult    Epigastric pain    Abnormal finding on GI tract imaging    Adenomyosis of gallbladder           Current Outpatient Medications:     amLODIPine (NORVASC) 5 MG tablet, Take 1 tablet by mouth Daily., Disp: 30 tablet, Rfl: 3    Aspirin-Salicylamide-Caffeine (BC HEADACHE POWDER PO), Take  by mouth 2 (Two) Times a Day As Needed., Disp: , Rfl:     cyclobenzaprine (FLEXERIL) 5 MG tablet, Take 1 tablet by mouth 3 (Three) Times a Day As Needed., Disp: , Rfl:     esomeprazole (nexIUM) 40 MG capsule, Take 1 capsule by mouth Every Morning Before Breakfast., Disp: 30 capsule, Rfl: 3    famotidine (Pepcid) 40 MG tablet, Take 1 tablet by mouth 2 (Two) Times a Day As Needed for Heartburn., Disp: 60 tablet,  Rfl: 2    gabapentin (NEURONTIN) 400 MG capsule, Take 1 capsule by mouth 3 (Three) Times a Day., Disp: , Rfl:     glycopyrrolate (ROBINUL) 2 MG tablet, Take 1 tablet by mouth 2 (Two) Times a Day., Disp: 60 tablet, Rfl: 2    Melatonin 10 MG tablet, Take 1 tablet by mouth Every Night., Disp: , Rfl:     mesalamine (Apriso) 0.375 g 24 hr capsule, Take 4 capsules by mouth Daily., Disp: 120 capsule, Rfl: 1    nicotine (Nicoderm CQ) 21 MG/24HR patch, Place 1 patch on the skin as directed by provider Daily., Disp: 30 patch, Rfl: 11    ondansetron ODT (Zofran ODT) 8 MG disintegrating tablet, Place 1 tablet on the tongue Every 8 (Eight) Hours As Needed for Nausea or Vomiting., Disp: 90 tablet, Rfl: 3    oxyCODONE-acetaminophen (PERCOCET)  MG per tablet, Take 1 tablet by mouth Every 6 (Six) Hours As Needed for Moderate Pain for up to 7 days., Disp: 28 tablet, Rfl: 0    rizatriptan MLT (MAXALT-MLT) 10 MG disintegrating tablet, Place 1 tablet on the tongue 1 (One) Time As Needed for Migraine. May repeat in 2 hours if needed, Disp: 10 tablet, Rfl: 3    Pt is 43 yo male with management of obesity, crohn's disease, GERD with esophagitis. History of pancreatitis , insomnia, migrained headaches. Obesity history of perforation of small intestines, Nausea/vomiting, hiatal hernia, KEESHA, tobacco user, immunocopromised, high risk medication use, sp ileum surgery., HLP, partial tear of common extensor tendon of elbow, medial epicondylitis of right elbow, CKD stage a, fatty liver , sp cholecystectomy     4/27/23 in office visit for recheck. Pt had labwork on 3/30/23 that showed normal amylase.lipase, hga1c normal lipid panel showed LDL at 133 from 135. CBC normal CMP showed stable kidney function and liver function. He was ordered US of abdomen and US of soft tissue for his upper abdominal mass and RUQ pain. His insurance would not cover US of abdomen. He reports no abdominal pain currently. He has appt tomorrow with Pain Management.  No nausea or vomiting. He has cut back on smoking. No jaundice no fever. Hi migraine headaches have improved with nurtech and it does help get rid of headaches.        8/1/23 in office visit for recheck.  Pt continues to have pain in his RUQ that radiates to epigastric area. He has episodes twice a week. No vomiting no nausea.his last EGD was in 2020 that showed gastritis and esophagitis. His US of abdomen on 5/9/23 showed mild thick gallbladder but no gallstones. HIDA scan was recommend. He continues to smoke tobacco about 1/2 ppd. He has yet to use nicotine patches.     9/6/23 in office visit for recheck. Pt saw GI on 8/17/23 and was recommended urgent EGD on 8/18/23 that showed esophagitis gastritis. Pathology showed reactive gastropathy. Pt saw GI again on 8/24/23. Pt had HIDA scan that showed nonvisualization of gallbladder. Pt was referred to General Surgery and had visit on 8/21/23.  Pt had obotic assisted cholecystectomy on 9/1/23. Pt reports swelling in abodmen mainly in epigastric area. He rates pain 5/10 on severity. He had vomiting a few days when he ate but not recently.  he has nausea. He had  bowel movement early this morning. Pt reports no fever no chills. He lost 14 lbs since last visit     Nicotine Dependence  Presents for follow-up visit. Symptoms include fatigue. Symptoms are negative for sore throat. The symptoms have been stable. He smokes 1 pack of cigarettes per day. Compliance with prior treatments has been variable.   Migraine  Headache pattern:  Headache sometimes there, sometimes not at all  Initial event:  None  Recent changes:  Headaches last longer than they used to  Frequency:  1 to 3 per month  Providers seen:  None  Lifetime total:  20+  Number of ER visits for headache:  0  Time of day symptoms are worse:  No specific time of day  Do headaches wake patient from sleep?: No    Season symptoms are worse:  No particular season  Laterality:  Unable to describe  Aggravating factors:   Light  Changes in thinking and mood:  Fatigue  Abortive medication frequency of use:  Less than 1 day a week  Abortive medications tried:  Rizatriptan  Vitamins and supplements tried:  None  Abdominal Pain  This is a chronic problem. The current episode started more than 1 month ago. The onset quality is sudden. The problem occurs constantly. The problem has been worsening The pain is located in the RLQ. The pain is at a severity of 4/10. The pain is moderate. The quality of the pain is aching. The abdominal pain radiates to the RLQ. Associated symptoms include arthralgias and headaches. Pertinent negatives include no anorexia, belching, constipation, diarrhea, dysuria, fever, flatus, frequency, hematochezia, hematuria, melena, myalgias, nausea or vomiting. Nothing aggravates the pain. The pain is relieved by nothing. His past medical history is significant for Crohn's disease. There is no history of abdominal surgery, colon cancer, gallstones, GERD, irritable bowel syndrome, pancreatitis, PUD or ulcerative colitis.   GI Problem  The primary symptoms include fatigue, abdominal pain and arthralgias. Primary symptoms do not include fever, nausea, vomiting or diarrhea.   Obesity  This is a chronic problem. The current episode started more than 1 year ago. The problem occurs constantly. Associated symptoms include abdominal pain, arthralgias, fatigue, headaches, numbness and weakness. Pertinent negatives include no chest pain, chills, congestion, coughing, diaphoresis, fever, nausea, sore throat or vomiting. Nothing aggravates the symptoms. He has tried nothing for the symptoms. The treatment provided no relief      Review of Systems  Review of Systems   Constitutional:  Positive for activity change and fatigue. Negative for appetite change, chills, diaphoresis and fever.   HENT:  Negative for congestion, postnasal drip, rhinorrhea, sinus pressure, sinus pain, sneezing, sore throat, trouble swallowing and voice change.     Respiratory:  Negative for cough, choking, chest tightness, shortness of breath, wheezing and stridor.    Cardiovascular:  Negative for chest pain.   Gastrointestinal:  Positive for abdominal pain, nausea and vomiting. Negative for diarrhea.   Musculoskeletal:  Positive for arthralgias.   Neurological:  Positive for weakness and headaches.     Patient Active Problem List   Diagnosis    Crohn's disease of small intestine with complication    Hiatal hernia    Long-term use of immunosuppressant medication    Obstructive sleep apnea syndrome    Pancreatitis    Perforation of small intestine    Tobacco dependence    Chronic migraine    Obesity (BMI 30-39.9)    Nausea and vomiting    Gastroesophageal reflux disease with esophagitis    Diarrhea    Migraine    High risk medication use    Stage 3a chronic kidney disease    Elevated blood pressure reading    Right lower quadrant abdominal pain    Tobacco user    Primary osteoarthritis of both feet    Bilateral foot pain    Abnormal results of thyroid function studies    Right forearm pain    Essential hypertension    Class 1 obesity with serious comorbidity and body mass index (BMI) of 34.0 to 34.9 in adult    Partial tear of common extensor tendon of elbow    Stage 2 chronic kidney disease    Class 1 obesity with serious comorbidity and body mass index (BMI) of 33.0 to 33.9 in adult    Right upper quadrant abdominal mass    RUQ pain    Class 1 obesity due to excess calories with serious comorbidity and body mass index (BMI) of 34.0 to 34.9 in adult    Epigastric pain    Abnormal finding on GI tract imaging    Adenomyosis of gallbladder     Past Surgical History:   Procedure Laterality Date    CHOLECYSTECTOMY      COLON SURGERY  2015    ileoscectomy    COLONOSCOPY      COLONOSCOPY N/A 10/07/2020    Procedure: COLONOSCOPY--look TI;  Surgeon: Wisam Carpio MD;  Location: [facility] ENDOSCOPY;  Service: Gastroenterology;  Laterality: N/A;    ENDOSCOPY N/A 10/07/2020     Procedure: ESOPHAGOGASTRODUODENOSCOPY--bx;  Surgeon: Wisam Carpio MD;  Location: [facility] ENDOSCOPY;  Service: Gastroenterology;  Laterality: N/A;    ENDOSCOPY N/A 08/18/2023    Procedure: ESOPHAGOGASTRODUODENOSCOPY;  Surgeon: Wisam Carpio MD;  Location: [facility] ENDOSCOPY;  Service: Gastroenterology;  Laterality: N/A;    HERNIA REPAIR  1981    UPPER GASTROINTESTINAL ENDOSCOPY  10/07/2020     Social History     Socioeconomic History    Marital status:    Tobacco Use    Smoking status: Every Day     Packs/day: 0.50     Years: 26.00     Pack years: 13.00     Types: Cigarettes     Start date: 1/1/1996    Smokeless tobacco: Never   Vaping Use    Vaping Use: Never used   Substance and Sexual Activity    Alcohol use: Not Currently    Drug use: Never    Sexual activity: Defer     Partners: Female     Birth control/protection: None     Family History   Problem Relation Age of Onset    Breast cancer Other     Brain cancer Other     Ulcers Other     Bleeding Disorder Other     Anxiety disorder Sister     Mental illness Sister         Involuntary commitment mid 90s    Cancer Maternal Aunt         Breast cancer then brain cancer     Pre-Admission Testing on 08/28/2023   Component Date Value Ref Range Status    Glucose 08/28/2023 83  65 - 99 mg/dL Final    BUN 08/28/2023 14  6 - 20 mg/dL Final    Creatinine 08/28/2023 1.30 (H)  0.76 - 1.27 mg/dL Final    Sodium 08/28/2023 143  136 - 145 mmol/L Final    Potassium 08/28/2023 4.3  3.5 - 5.2 mmol/L Final    Chloride 08/28/2023 106  98 - 107 mmol/L Final    CO2 08/28/2023 28.0  22.0 - 29.0 mmol/L Final    Calcium 08/28/2023 8.8  8.6 - 10.5 mg/dL Final    BUN/Creatinine Ratio 08/28/2023 10.8  7.0 - 25.0 Final    Anion Gap 08/28/2023 9.0  5.0 - 15.0 mmol/L Final    eGFR 08/28/2023 69.5  >60.0 mL/min/1.73 Final    QT Interval 08/28/2023 400  ms Preliminary    QTC Interval 08/28/2023 425  ms Preliminary   Admission on 08/18/2023, Discharged on 08/18/2023   Component Date Value  Ref Range Status    Reference Lab Report 2023    Final                    Value:[facility]  [address] [place] [address]  Phone: [phone] or [phone]  Fax: [phone]  Juan Alberto Cisneros M.D., Medical Director    PATIENT NAME                                     LABORATORY NO.  [address]   [address].                            [id number]  [id number]                                 AGE                    SEX   SSN              CLIENT REF #  [facility]                   42        1980           [id number]      [id number]    [facility]                               REQUESTING M.D.           ATTENDING M.D.         COPY [place] [place] [address]                     DATE COLLECTED            DATE RECEIVED          DATE REPORTED  2023    DIAGNOSIS:  A.     SMALL BOWEL, BIOPSY:  Unremarkable duodenal type mucosa  Negative for villous blunting, increased                           intraepithelial lymphocytes,  organisms and malignancy  B.     ANTRUM, BIOPSY:  Gastric antral type mucosa with reactive (chemical) gastropathy  Negative for intestinal metaplasia or dysplasia  No Helicobacter pylori-like organisms seen  C.     ESOPHAGUS, BIOPSY, DISTAL:  Squamous mucosa with features suggestive of reflux esophagitis  Negative for eosinophilic esophagitis (0-2 eosinophils/hpf)  Negative for glandular type mucosa, intestinal metaplasia, or dysplasia    CLINICAL HISTORY:  Epigastric pain, gastroesophageal reflux disease with esophagitis without  hemorrhage, nausea, abnormal finding on GI tract imaging    SPECIMENS RECEIVED:  A.    SMALL BOWEL, BIOPSY  B.    ANTRUM, BIOPSY  C.    ESOPHAGUS, BIOPSY , DISTAL    MICROSCOPIC DESCRIPTION:  Tissue blocks are prepared and slides are examined microscopically on  "all  specimens. See diagnosis for details.    Professional interpretation rendered by Wisam Centeno M.D., ALICIA. at [facility], [address] [place] [address].    GROSS DESCRIPTION:  A.    Labeled \"small bowel biopsy\".  Consists of 2 pieces of tan soft tissue  measuring 0.5 x 0.2 x 0.2 cm in aggregate and is submitted entirely in 1  block.  SONIA  B.    Labeled \"antrum biopsy\".  Consists of 1 piece of tan soft tissue measuring  0.4 x 0.2 x 0.2 cm and is submitted entirely in 1 block.  C.    Labeled \"distal esophagus biopsy\".  Consists of 1 piece of tan soft tissue  measuring 0.4 x 0.2 x 0.1 cm in aggregate and is submitted entirely in 1  block.    REVIEWED, DIAGNOSED AND ELECTRONICALLY  SIGNED BY:    Wisam Centeno M.D., GERMAINCPamA.P.  CPT CODES:  88305x3     Lab on 08/17/2023   Component Date Value Ref Range Status    Fibrosis Score 08/17/2023 0.07  0.00 - 0.21 Final    Fibrosis Stage 08/17/2023 Comment   Final                       F0 - No fibrosis    Steatosis Score (Reference) 08/17/2023 0.37  0.00 - 0.40 Final    Steatosis Grade (Reference) 08/17/2023 Comment   Final                       S0 - No Steatosis (<5%)    COREAS Score (Reference) 08/17/2023 0.00  0.00 - 0.25 Final    Coreas Grade (Reference) 08/17/2023 Comment   Final                       N0 - No COREAS    Methodology: 08/17/2023 Comment   Final    The analytes tested are performed by FibroSure-Specific methods.  Not intended for use with other diagnostic considerations.    Alpha 2-Macroglobulins, Qn 08/17/2023 136  110 - 276 mg/dL Final    Haptoglobin 08/17/2023 165  23 - 355 mg/dL Final    Apolipoprotein A-1 08/17/2023 122  101 - 178 mg/dL Final    Total Bilirubin 08/17/2023 0.3  0.0 - 1.2 mg/dL Final    GGT 08/17/2023 20  0 - 65 IU/L Final    ALT (SGPT) 08/17/2023 29  0 - 55 IU/L Final    AST (SGOT) P5P (Reference) 08/17/2023 23  0 - 40 IU/L Final    Cholesterol, Total (Reference) 08/17/2023 181  100 - 199 mg/dL Final "    Glucose, Serum (Reference) 2023 79  70 - 99 mg/dL Final    Triglycerides 2023 131  0 - 149 mg/dL Final    Interpretations: (Reference) 2023 Comment   Final    Comment: Quantitative results of 10 biochemicals in combination with age and  gender, are analyzed using a computational algorithm to provide a  quantitative surrogate marker (0.0-1.0) of liver fibrosis (Metavir  F0-F4), hepatic steatosis (0.0-1.0, S0-S3), and Non-Alcoholic  Steato-Hepatitis (COREAS) (0.0-1.0, N0-N3). The absence of steatosis  (S<0.40) precludes the diagnosis of COREAS.  Fibrosis marker: In a study of 171 Non-Alcoholic Fatty Liver Disease  (NAFLD) patients where 23% had significant NAFLD fibrosis (Metavir  F2-F4) and 11% had cirrhosis by liver biopsy, a fibrosis result of  >0.3 yielded a sensitivity of 83% and a specificity of 78% for the  detection of significant fibrosis.[1]  Steatosis marker: In a population of 2997 patients, where 61% had  significant steatosis (>=5%) on a liver biopsy, a steatosis score  >0.4 had a sensitivity of 79% and a specificity of 50% for  identification of significant steatosis.[2]  COREAS marker: In a population of 1081 NAFLD patients, where 51% had  at                            least some COREAS by liver biopsy, a prediction of COREAS had a  sensitivity of 72% for identifying COREAS and a specificity of 71%.[3]    Fibrosis Scorin2023 Comment   Final         <=0.21 = Stage F0 - No fibrosis  0.21 - 0.27 = Stage F0 - F1  0.27 - 0.31 = Stage F1 - Portal fibrosis  0.31 - 0.48 = Stage F1 - F2  0.48 - 0.58 = Stage F2 - Bridging fibrosis with few septa  0.58 - 0.72 = Stage F3 - Bridging fibrosis with many septa  0.72 - 0.74 = Stage F3 - F4        >0.74 = Stage F4 - Cirrhosis    Steatosis Scoring 2023 Comment   Final         <=0.40 = S0  - No Steatosis (<5%)  0.40 - 0.55 = S1  - Mild Steatosis                      (but Clinically Significant) (5-33%)        >0.55 = S2S3- Moderate to Severe  Steatosis                      (Clinically Significant) (%)    COREAS Scoring 08/17/2023 Comment   Final         <=0.25 = N0 - No COREAS  0.25 - 0.50 = N1 - Mild COREAS  0.50 - 0.75 = N2 - Moderate COREAS        >0.75 = N3 - Severe COREAS    Limitations: 08/17/2023 Comment   Final    COREAS FibroSure(R) Plus is recommended for patients with suspected  non-alcoholic fatty liver disease. It is not recommended for  patients with other liver diseases. It is also not recommended  in patients with Gilbert Disease, acute hemolysis, acute viral  hepatitis, drug induced hepatitis, genetic liver disease,  autoimmune hepatitis and/or extra-hepatic cholestasis. Any of  these clinical situations may lead to inaccurate quantitative  predictions of fibrosis.    Comment 08/17/2023 Comment   Final    This test was developed and its performance characteristics  determined by [facility]. It has not been cleared or approved  by the Food and Drug Administration.  For questions regarding this report please contact customer service  at [phone].  References:  1.  Aline SOMMERS et al. Diagnostic Value of Biochemical Markers  (FibroTest) for the prediction of Liver Fibrosis in patients with  Non-Alcoholic Fatty Liver Disease. BMC Gastroenterology 2006; 6:6.  2.  Chris PEREZ. et al. The Diagnostic Performance of a Simplified  Blood Test (SteatoTest-2) for the Prediction of Liver Steatosis.  Eur J Gastroenterol Hepatol. 2019; 31:393-402.  3.  Chris PEREZ. et al. Diagnostic performance of a new noninvasive  test for nonalcoholic steatohepatitis using a simplified histological  reference. Eur J Gastroenterol Hepatol. 2018 May; 30:569-577.    Protime 08/17/2023 12.7  11.1 - 15.3 Seconds Final    INR 08/17/2023 0.95  0.80 - 1.20 Final    Amylase 08/17/2023 77  28 - 100 U/L Final    Lipase 08/17/2023 37  13 - 60 U/L Final   Lab on 03/30/2023   Component Date Value Ref Range Status    WBC 03/30/2023 9.27  3.40 - 10.80 10*3/mm3 Final    RBC 03/30/2023 4.99   4.14 - 5.80 10*6/mm3 Final    Hemoglobin 03/30/2023 15.9  13.0 - 17.7 g/dL Final    Hematocrit 03/30/2023 45.0  37.5 - 51.0 % Final    MCV 03/30/2023 90.2  79.0 - 97.0 fL Final    MCH 03/30/2023 31.9  26.6 - 33.0 pg Final    MCHC 03/30/2023 35.3  31.5 - 35.7 g/dL Final    RDW 03/30/2023 12.8  12.3 - 15.4 % Final    RDW-SD 03/30/2023 42.5  37.0 - 54.0 fl Final    MPV 03/30/2023 10.2  6.0 - 12.0 fL Final    Platelets 03/30/2023 308  140 - 450 10*3/mm3 Final    Neutrophil % 03/30/2023 60.6  42.7 - 76.0 % Final    Lymphocyte % 03/30/2023 27.7  19.6 - 45.3 % Final    Monocyte % 03/30/2023 7.3  5.0 - 12.0 % Final    Eosinophil % 03/30/2023 3.1  0.3 - 6.2 % Final    Basophil % 03/30/2023 0.9  0.0 - 1.5 % Final    Immature Grans % 03/30/2023 0.4  0.0 - 0.5 % Final    Neutrophils, Absolute 03/30/2023 5.61  1.70 - 7.00 10*3/mm3 Final    Lymphocytes, Absolute 03/30/2023 2.57  0.70 - 3.10 10*3/mm3 Final    Monocytes, Absolute 03/30/2023 0.68  0.10 - 0.90 10*3/mm3 Final    Eosinophils, Absolute 03/30/2023 0.29  0.00 - 0.40 10*3/mm3 Final    Basophils, Absolute 03/30/2023 0.08  0.00 - 0.20 10*3/mm3 Final    Immature Grans, Absolute 03/30/2023 0.04  0.00 - 0.05 10*3/mm3 Final    nRBC 03/30/2023 0.0  0.0 - 0.2 /100 WBC Final    Glucose 03/30/2023 85  65 - 99 mg/dL Final    BUN 03/30/2023 19  6 - 20 mg/dL Final    Creatinine 03/30/2023 1.09  0.76 - 1.27 mg/dL Final    Sodium 03/30/2023 142  136 - 145 mmol/L Final    Potassium 03/30/2023 4.9  3.5 - 5.2 mmol/L Final    Chloride 03/30/2023 104  98 - 107 mmol/L Final    CO2 03/30/2023 28.0  22.0 - 29.0 mmol/L Final    Calcium 03/30/2023 9.4  8.6 - 10.5 mg/dL Final    Total Protein 03/30/2023 7.1  6.0 - 8.5 g/dL Final    Albumin 03/30/2023 4.2  3.5 - 5.2 g/dL Final    ALT (SGPT) 03/30/2023 23  1 - 41 U/L Final    AST (SGOT) 03/30/2023 16  1 - 40 U/L Final    Alkaline Phosphatase 03/30/2023 77  39 - 117 U/L Final    Total Bilirubin 03/30/2023 0.2  0.0 - 1.2 mg/dL Final    Globulin  03/30/2023 2.9  gm/dL Final    A/G Ratio 03/30/2023 1.4  g/dL Final    BUN/Creatinine Ratio 03/30/2023 17.4  7.0 - 25.0 Final    Anion Gap 03/30/2023 10.0  5.0 - 15.0 mmol/L Final    eGFR 03/30/2023 85.8  >60.0 mL/min/1.73 Final    Total Cholesterol 03/30/2023 197  0 - 200 mg/dL Final    Triglycerides 03/30/2023 116  0 - 150 mg/dL Final    HDL Cholesterol 03/30/2023 43  40 - 60 mg/dL Final    LDL Cholesterol  03/30/2023 133 (H)  0 - 100 mg/dL Final    VLDL Cholesterol 03/30/2023 21  5 - 40 mg/dL Final    LDL/HDL Ratio 03/30/2023 3.04   Final    Hemoglobin A1C 03/30/2023 5.20  4.80 - 5.60 % Final    Amylase 03/30/2023 76  28 - 100 U/L Final    Lipase 03/30/2023 44  13 - 60 U/L Final      NM HIDA SCAN WITHOUT PHARMACOLOGICAL INTERVENTION  Narrative: INDICATION:  Biliary colic, recurrent, gallbladder dyskinesia suspected.    COMPARISON:  None relevant.    RADIOPHARMACEUTICALS:  6.4 mCi technetium 99m mebrofenin, IV    TECHNIQUE:  Following intravenous administration of the radiopharmaceutical, 60 minutes of  anterior dynamic imaging was obtained.    FINDINGS:  Good uptake of radiotracer by the liver is seen. Gallbladder is nonvisualized..  Small bowel activity is seen by 15 minutes minutes postinjection.  Impression: Nonvisualization of the gallbladder which may be related to cystic duct  obstruction    [unfilled]  Immunization History   Administered Date(s) Administered    Fluzone >6mos 11/13/2018    Fluzone Quad >6mos (Multi-dose) 11/10/2017    Pneumococcal Conjugate 13-Valent (PCV13) 07/17/2018    Pneumococcal Polysaccharide (PPSV23) 05/11/2016, 01/09/2017       The following portions of the patient's history were reviewed and updated as appropriate: allergies, current medications, past family history, past medical history, past social history, past surgical history and problem list.        Physical Exam  /82 (BP Location: Left arm, Patient Position: Sitting, Cuff Size: Adult)   Pulse 84   Temp 98.2 °F  "(36.8 °C)   Ht 172.7 cm (68\")   Wt 98.3 kg (216 lb 12.8 oz)   SpO2 98%   BMI 32.96 kg/m²         Physical Exam  Vitals and nursing note reviewed.   Constitutional:       Appearance: He is well-developed. He is not diaphoretic.   HENT:      Head: Normocephalic and atraumatic.      Right Ear: External ear normal.   Eyes:      Conjunctiva/sclera: Conjunctivae normal.      Pupils: Pupils are equal, round, and reactive to light.   Cardiovascular:      Rate and Rhythm: Normal rate and regular rhythm.      Heart sounds: Normal heart sounds. No murmur heard.  Pulmonary:      Effort: Pulmonary effort is normal. No respiratory distress.      Breath sounds: Normal breath sounds.   Abdominal:      General: Bowel sounds are normal. There is no distension.      Palpations: Abdomen is soft.      Tenderness: There is abdominal tenderness. Positive signs include Jang's sign.      Comments: Obese abdomen    Some swelling in epigastric area. Moderate tenderness on palpation   Musculoskeletal:         General: Tenderness present. No deformity.      Right forearm: Tenderness and bony tenderness present.      Cervical back: Normal range of motion and neck supple.      Right foot: Decreased range of motion. Tenderness and bony tenderness present.      Left foot: Decreased range of motion. Tenderness present.      Comments: Right antecubital pain medially. Tenderness on palpation    Skin:     General: Skin is warm.      Coloration: Skin is not pale.      Findings: No erythema or rash.   Neurological:      Mental Status: He is alert and oriented to person, place, and time.      Cranial Nerves: No cranial nerve deficit.   Psychiatric:         Behavior: Behavior normal.       [unfilled]   Diagnosis Plan   1. Essential hypertension  CBC Auto Differential    Comprehensive Metabolic Panel    C-reactive protein    XR Abdomen KUB      2. Crohn's disease of small intestine with complication  CBC Auto Differential    Comprehensive " Metabolic Panel    C-reactive protein    XR Abdomen KUB      3. Gastroesophageal reflux disease with esophagitis without hemorrhage  CBC Auto Differential    Comprehensive Metabolic Panel    C-reactive protein    XR Abdomen KUB      4. RUQ pain  CBC Auto Differential    Comprehensive Metabolic Panel    C-reactive protein    XR Abdomen KUB      5. Stage 3a chronic kidney disease  CBC Auto Differential    Comprehensive Metabolic Panel    C-reactive protein    XR Abdomen KUB      6. Other migraine without status migrainosus, not intractable  CBC Auto Differential    Comprehensive Metabolic Panel    C-reactive protein    XR Abdomen KUB      7. Tobacco user  CBC Auto Differential    Comprehensive Metabolic Panel    C-reactive protein    XR Abdomen KUB      8. Nicotine dependence with nicotine-induced disorder, unspecified nicotine product type  CBC Auto Differential    Comprehensive Metabolic Panel    C-reactive protein    XR Abdomen KUB      9. Gastritis without bleeding, unspecified chronicity, unspecified gastritis type  CBC Auto Differential    Comprehensive Metabolic Panel    C-reactive protein    XR Abdomen KUB      10. S/P cholecystectomy  CBC Auto Differential    Comprehensive Metabolic Panel    C-reactive protein    XR Abdomen KUB      11. Epigastric pain  CBC Auto Differential    Comprehensive Metabolic Panel    C-reactive protein    XR Abdomen KUB              -went over labwork   -recommend pneumonia vaccination  -recommend COVID-19 vaccination  -RUQ/sp cholecystectomy/abdominal pain [facility] following had recent EGD.  Had cholecystectomy on 9/1/23.pt is in pain. Will get labwork and KUB. Advised pt to go to ER or call 911 if symptoms getting worse.    -nausea/vomiting -on zofran   -right foream pain/antecubital pain/common extensor tear of elbow/right medial epicondylitis - Orthopedic following, has had PT/OT, local injections, and prednisone  -bilateral foot pain/osteoarthritis of  both feet - reviewed x-ray has achilles bone spur. Referred to Podiatry   -HTN - stable on norvasc 5 mg daily.   -HLP - recommend heart health ydiet   -N/V - zofran  8mg PO every 8 hours PRN    -high risk medication use / pain on right side -  PM following on neurontin and Percocet   -CKD stage 2- Nephrology following   -Crohn's disease/GERD/ with esophagitis/gastritis/fatty liver   - GI following was on lialda 4800 mg PO qam on nexium 40 mg daily. Recommend repeat EGD and for pt go Call GI for an appt   -migraine headaches - was on ubrelvy and did not help.has failed maxalt and imitrex.   Continue on nurtech 75 mg PO  PRN.   -obesity - counseled weight loss >5 minutes BMI at 34.67   -tobacco user - counseled quit smoking >5 minutes recommend 1 800 QUIT NOW. Start on nicotine patch  -advised pt to be safe and call with questions and concerns  -advised pt to go to ER or call 911 if symptoms worrisome or severe  -advised pt to followup with specialist and referrals  -advised pt to be safe during COVID-19 pandemic  I spent 35 minutes caring for Michel on this date of service. This time includes time spent by me in the following activities: preparing for the visit, reviewing tests, obtaining and/or reviewing a separately obtained history, performing a medically appropriate examination and/or evaluation, counseling and educating the patient/family/caregiver, ordering medications, tests, or procedures, referring and communicating with other health care professionals, documenting information in the medical record, independently interpreting results and communicating that information with the patient/family/caregiver and care coordination.   -recheck in 6 weeks         This document has been electronically signed by Dru Vicente MD on September 6, 2023 09:42 CDT

## 2023-08-17 ENCOUNTER — LAB (OUTPATIENT)
Dept: LAB | Facility: HOSPITAL | Age: 43
End: 2023-08-17
Payer: COMMERCIAL

## 2023-08-17 ENCOUNTER — OFFICE VISIT (OUTPATIENT)
Dept: GASTROENTEROLOGY | Facility: CLINIC | Age: 43
End: 2023-08-17
Payer: COMMERCIAL

## 2023-08-17 VITALS
BODY MASS INDEX: 34.53 KG/M2 | WEIGHT: 227.8 LBS | HEART RATE: 76 BPM | DIASTOLIC BLOOD PRESSURE: 92 MMHG | HEIGHT: 68 IN | SYSTOLIC BLOOD PRESSURE: 135 MMHG

## 2023-08-17 DIAGNOSIS — K44.9 HIATAL HERNIA: ICD-10-CM

## 2023-08-17 DIAGNOSIS — R10.33 PERIUMBILICAL ABDOMINAL PAIN: ICD-10-CM

## 2023-08-17 DIAGNOSIS — Z72.0 TOBACCO USER: ICD-10-CM

## 2023-08-17 DIAGNOSIS — K76.0 FATTY LIVER: ICD-10-CM

## 2023-08-17 DIAGNOSIS — K21.00 GASTROESOPHAGEAL REFLUX DISEASE WITH ESOPHAGITIS WITHOUT HEMORRHAGE: ICD-10-CM

## 2023-08-17 DIAGNOSIS — R11.0 NAUSEA: ICD-10-CM

## 2023-08-17 DIAGNOSIS — R93.3 ABNORMAL FINDING ON GI TRACT IMAGING: ICD-10-CM

## 2023-08-17 DIAGNOSIS — Z78.9 NEED FOR NICOTINE REPLACEMENT THERAPY: ICD-10-CM

## 2023-08-17 DIAGNOSIS — R10.11 RUQ PAIN: ICD-10-CM

## 2023-08-17 DIAGNOSIS — K50.019 CROHN'S DISEASE OF SMALL INTESTINE WITH COMPLICATION: ICD-10-CM

## 2023-08-17 DIAGNOSIS — I10 ESSENTIAL HYPERTENSION: ICD-10-CM

## 2023-08-17 DIAGNOSIS — R10.13 EPIGASTRIC PAIN: Primary | ICD-10-CM

## 2023-08-17 DIAGNOSIS — N18.2 STAGE 2 CHRONIC KIDNEY DISEASE: ICD-10-CM

## 2023-08-17 DIAGNOSIS — R19.7 DIARRHEA, UNSPECIFIED TYPE: ICD-10-CM

## 2023-08-17 DIAGNOSIS — G43.809 OTHER MIGRAINE WITHOUT STATUS MIGRAINOSUS, NOT INTRACTABLE: ICD-10-CM

## 2023-08-17 LAB
AMYLASE SERPL-CCNC: 77 U/L (ref 28–100)
INR PPP: 0.95 (ref 0.8–1.2)
LIPASE SERPL-CCNC: 37 U/L (ref 13–60)
PROTHROMBIN TIME: 12.7 SECONDS (ref 11.1–15.3)

## 2023-08-17 PROCEDURE — 82977 ASSAY OF GGT: CPT

## 2023-08-17 PROCEDURE — 82947 ASSAY GLUCOSE BLOOD QUANT: CPT

## 2023-08-17 PROCEDURE — 83010 ASSAY OF HAPTOGLOBIN QUANT: CPT

## 2023-08-17 PROCEDURE — 84478 ASSAY OF TRIGLYCERIDES: CPT

## 2023-08-17 PROCEDURE — 82465 ASSAY BLD/SERUM CHOLESTEROL: CPT

## 2023-08-17 PROCEDURE — 82172 ASSAY OF APOLIPOPROTEIN: CPT

## 2023-08-17 PROCEDURE — 82247 BILIRUBIN TOTAL: CPT

## 2023-08-17 PROCEDURE — 83690 ASSAY OF LIPASE: CPT

## 2023-08-17 PROCEDURE — 84460 ALANINE AMINO (ALT) (SGPT): CPT

## 2023-08-17 PROCEDURE — 84450 TRANSFERASE (AST) (SGOT): CPT

## 2023-08-17 PROCEDURE — 85610 PROTHROMBIN TIME: CPT

## 2023-08-17 PROCEDURE — 82150 ASSAY OF AMYLASE: CPT

## 2023-08-17 PROCEDURE — 83883 ASSAY NEPHELOMETRY NOT SPEC: CPT

## 2023-08-17 RX ORDER — DEXTROSE AND SODIUM CHLORIDE 5; .45 G/100ML; G/100ML
30 INJECTION, SOLUTION INTRAVENOUS CONTINUOUS PRN
Status: CANCELLED | OUTPATIENT
Start: 2023-08-17

## 2023-08-17 NOTE — PROGRESS NOTES
Chief Complaint   Patient presents with    Abdominal Pain    Diarrhea    Heartburn    fatty  liver       ENDO PROCEDURE ORDERED: EGD n, abd pain, gerd    Subjective    Michel Harmon is a 42 y.o. male. he is here today for follow-up.    History of Present Illness    Patient presents with increasing abdominal pain, nausea, vomiting, feels very drained all the time. Last seen 06/30/2023.  At that time he was having some intermittent abdominal discomfort.  He had had an ultrasound done 05/09/2023 showing adenomyomatosis of the gallbladder with a fatty liver.  He complains of 4 out of 10 pain today.  He states it has been persistent, he feels very drained,he has been having nausea and vomiting.  The reflux has been intermittently worse.  He is on Nexium 40 mg daily.  His bowel movements have been okay without blood or mucus.  Weight is up 2.8 pounds since last visit.  He is on Robinul as needed.  He takes 4 Apriso daily for previous ileitis.  Last EGD/colonoscopy 10/07/2020.  He has been seeing Dr. Vicente recently, several times for this persistent pain.  He had reached out to the office.  There was a lack of communication between his messages and myself causing delay until I could get him in today.  Patient states Dr. Vicente suggested he try using apple cider vinegar to see if it would help with some of his symptoms,  He states it does seem to be helping a little bit but he does not like the taste.  He did have a HIDA scan 08/14/2023.  There was nonvisualization of the gallbladder suggesting possible cystic duct obstruction.  I did look at the images with the patient.  Dr. Vicente had suggested further imaging but I think given the patient's symptoms MRCP would not be necessary at this point.  He did get laboratories drawn today.  Prothrombin time was 0.95, an amylase, lipase, CORAES FibroSure were drawn but are still pending.      A/P:  Patient with nausea, vomiting, abdominal pain, increased GERD symptoms.   Consider peptic ulcer disease.  I recommended an urgent EGD.  This will be done tomorrow.  I have recommended surgical consultation and patient did not have a surgical preference, I have recommended Dr. Quintana as I suspect he does have symptomatic gallbladder disease with nonvisualization of the gallbladder on HIDA scan, the adenomyomatosis, and his abdominal pain with positive Jang's.  Will plan follow-up after the above, further pending clinical course and the results of the above.            The following portions of the patient's history were reviewed and updated as appropriate:   Past Medical History:   Diagnosis Date    Anxiety 2016    Asthma 2013    Sleep Apnea    Cholelithiasis 2013    Colon polyp 2018    Crohn's disease     Erectile dysfunction 2014    GERD (gastroesophageal reflux disease)     Headache 2016    Irritable bowel syndrome 2016    Kidney stone 2018    Pancreatitis 2017    Renal insufficiency 2022    3C    Sleep apnea     Stroke 2015    During ielloscectomy    Visual impairment 1985    Glasses     Past Surgical History:   Procedure Laterality Date    COLON SURGERY  2015    COLONOSCOPY      COLONOSCOPY N/A 10/07/2020    Procedure: COLONOSCOPY--look TI;  Surgeon: Wisam Carpio MD;  Location: [facility] ENDOSCOPY;  Service: Gastroenterology;  Laterality: N/A;    ENDOSCOPY N/A 10/07/2020    Procedure: ESOPHAGOGASTRODUODENOSCOPY--bx;  Surgeon: Wisam Carpio MD;  Location: [facility] ENDOSCOPY;  Service: Gastroenterology;  Laterality: N/A;    HERNIA REPAIR  1981    UPPER GASTROINTESTINAL ENDOSCOPY  10/07/2020     Family History   Problem Relation Age of Onset    Breast cancer Other     Brain cancer Other     Ulcers Other     Bleeding Disorder Other     Anxiety disorder Sister     Mental illness Sister         Involuntary commitment mid 90s    Cancer Maternal Aunt         Breast cancer then brain cancer       No Known Allergies  Social History     Socioeconomic History    Marital status:     Tobacco Use    Smoking status: Every Day     Packs/day: 1.00     Years: 26.00     Pack years: 26.00     Types: Cigarettes     Start date: 1/1/1996    Smokeless tobacco: Never   Vaping Use    Vaping Use: Never used   Substance and Sexual Activity    Alcohol use: Not Currently    Drug use: Never    Sexual activity: Yes     Partners: Female     Birth control/protection: None     Comment: We're old     Current Medications:  Prior to Admission medications    Medication Sig Start Date End Date Taking? Authorizing Provider   amLODIPine (NORVASC) 5 MG tablet Take 1 tablet by mouth Daily. 4/27/23  Yes Dru Vicente MD   Aspirin-Salicylamide-Caffeine (BC HEADACHE POWDER PO) Take  by mouth 2 (Two) Times a Day As Needed.   Yes Sharon Verduzco MD   cyclobenzaprine (FLEXERIL) 5 MG tablet Take 1 tablet by mouth 3 (Three) Times a Day As Needed. 3/18/22  Yes Sharon Verduzco MD   esomeprazole (nexIUM) 40 MG capsule Take 1 capsule by mouth Every Morning Before Breakfast. 5/16/23  Yes Dru Vicente MD   gabapentin (NEURONTIN) 400 MG capsule  12/28/22  Yes Sharon Verduzco MD   glycopyrrolate (ROBINUL) 2 MG tablet Take 1 tablet by mouth 2 (Two) Times a Day. 1/10/23  Yes Dru Corrales PA-C   melatonin 5 MG tablet tablet Take 1 tablet by mouth As Needed.   Yes Sharon Verduzco MD   mesalamine (Apriso) 0.375 g 24 hr capsule Take 4 capsules by mouth Daily. 6/30/23  Yes Dru Corrales PA-C   nicotine (Nicoderm CQ) 21 MG/24HR patch Place 1 patch on the skin as directed by provider Daily. 4/27/23  Yes Dru Vicente MD   ondansetron ODT (Zofran ODT) 8 MG disintegrating tablet Place 1 tablet on the tongue Every 8 (Eight) Hours As Needed for Nausea or Vomiting. 8/1/23  Yes Dru Vicente MD   oxyCODONE-acetaminophen (PERCOCET)  MG per tablet Take 1 tablet by mouth 3 (Three) Times a Day. 8/6/20  Yes Sharon Verduzco MD   Rimegepant Sulfate (Nurtec) 75 MG tablet dispersible tablet Take 1 tablet  "by mouth Daily As Needed (migraine headache). 8/1/23  Yes Dru Vicente MD     Review of Systems  Review of Systems   Constitutional:  Positive for activity change, appetite change and fatigue. Negative for unexpected weight change.   HENT:  Negative for trouble swallowing.    Gastrointestinal:  Positive for abdominal pain, nausea and vomiting.        Objective    /92   Pulse 76   Ht 172.7 cm (68\")   Wt 103 kg (227 lb 12.8 oz)   BMI 34.64 kg/mý   Physical Exam  Vitals and nursing note reviewed.   Constitutional:       General: He is not in acute distress.     Appearance: He is well-developed. He is obese. He is ill-appearing.   HENT:      Head: Normocephalic and atraumatic.   Eyes:      Pupils: Pupils are equal, round, and reactive to light.   Cardiovascular:      Rate and Rhythm: Normal rate and regular rhythm.      Heart sounds: Normal heart sounds.   Pulmonary:      Effort: Pulmonary effort is normal.      Breath sounds: Normal breath sounds.   Abdominal:      General: Bowel sounds are normal. There is distension. There is no abdominal bruit.      Palpations: Abdomen is soft. Abdomen is not rigid. There is no shifting dullness or mass.      Tenderness: There is abdominal tenderness. There is no guarding or rebound.      Hernia: No hernia is present. There is no hernia in the ventral area.   Musculoskeletal:         General: Normal range of motion.      Cervical back: Normal range of motion.   Skin:     General: Skin is warm and dry.   Neurological:      Mental Status: He is alert and oriented to person, place, and time.   Psychiatric:         Behavior: Behavior normal.         Thought Content: Thought content normal.         Judgment: Judgment normal.     Assessment & Plan      1. Epigastric pain    2. Gastroesophageal reflux disease with esophagitis without hemorrhage    3. Nausea    4. Abnormal finding on GI tract imaging    .   Diagnoses and all orders for this visit:    1. Epigastric pain " (Primary)  -     Case Request; Standing  -     dextrose 5 % and sodium chloride 0.45 % infusion  -     Case Request  -     Ambulatory Referral to General Surgery    2. Gastroesophageal reflux disease with esophagitis without hemorrhage  -     Case Request; Standing  -     dextrose 5 % and sodium chloride 0.45 % infusion  -     Case Request  -     Ambulatory Referral to General Surgery    3. Nausea  -     Case Request; Standing  -     dextrose 5 % and sodium chloride 0.45 % infusion  -     Case Request  -     Ambulatory Referral to General Surgery    4. Abnormal finding on GI tract imaging  -     Case Request; Standing  -     dextrose 5 % and sodium chloride 0.45 % infusion  -     Case Request  -     Ambulatory Referral to General Surgery    Other orders  -     Obtain Informed Consent; Standing  -     Follow Anesthesia Guidelines / Protocol; Future  -     Obtain Informed Consent; Future  -     POC Glucose Once; Standing        Orders placed during this encounter include:  Orders Placed This Encounter   Procedures    Ambulatory Referral to General Surgery     Referral Priority:   Routine     Referral Type:   Consultation     Referral Reason:   Specialty Services Required     Referred to Provider:   Justino Quintana MD     Requested Specialty:   General Surgery     Number of Visits Requested:   1    Obtain Informed Consent     Standing Status:   Future     Order Specific Question:   Informed Consent Given For     Answer:   ESOPHAGOGASTRODUODENOSCOPY       Medications prescribed:  No orders of the defined types were placed in this encounter.      Requested Prescriptions      No prescriptions requested or ordered in this encounter       Review and/or summary of lab tests, radiology, procedures, medications. Review and summary of old records and obtaining of history. The risks and benefits of my recommendations, as well as other treatment options were discussed with the patient today. Questions were  answered.    Follow-up: Return if symptoms worsen or fail to improve, for After the above.     ESOPHAGOGASTRODUODENOSCOPY (N/A)      This document has been electronically signed by Dru Corrales PA-C on August 17, 2023 20:20 CDT      Results for orders placed or performed in visit on 08/17/23   Protime-INR    Specimen: Blood   Result Value Ref Range    Protime 12.7 11.1 - 15.3 Seconds    INR 0.95 0.80 - 1.20   Lipase    Specimen: Blood   Result Value Ref Range    Lipase 37 13 - 60 U/L   Amylase    Specimen: Blood   Result Value Ref Range    Amylase 77 28 - 100 U/L   Results for orders placed or performed in visit on 03/30/23   CBC Auto Differential    Specimen: Blood   Result Value Ref Range    WBC 9.27 3.40 - 10.80 10*3/mm3    RBC 4.99 4.14 - 5.80 10*6/mm3    Hemoglobin 15.9 13.0 - 17.7 g/dL    Hematocrit 45.0 37.5 - 51.0 %    MCV 90.2 79.0 - 97.0 fL    MCH 31.9 26.6 - 33.0 pg    MCHC 35.3 31.5 - 35.7 g/dL    RDW 12.8 12.3 - 15.4 %    RDW-SD 42.5 37.0 - 54.0 fl    MPV 10.2 6.0 - 12.0 fL    Platelets 308 140 - 450 10*3/mm3    Neutrophil % 60.6 42.7 - 76.0 %    Lymphocyte % 27.7 19.6 - 45.3 %    Monocyte % 7.3 5.0 - 12.0 %    Eosinophil % 3.1 0.3 - 6.2 %    Basophil % 0.9 0.0 - 1.5 %    Immature Grans % 0.4 0.0 - 0.5 %    Neutrophils, Absolute 5.61 1.70 - 7.00 10*3/mm3    Lymphocytes, Absolute 2.57 0.70 - 3.10 10*3/mm3    Monocytes, Absolute 0.68 0.10 - 0.90 10*3/mm3    Eosinophils, Absolute 0.29 0.00 - 0.40 10*3/mm3    Basophils, Absolute 0.08 0.00 - 0.20 10*3/mm3    Immature Grans, Absolute 0.04 0.00 - 0.05 10*3/mm3    nRBC 0.0 0.0 - 0.2 /100 WBC   Lipase    Specimen: Blood   Result Value Ref Range    Lipase 44 13 - 60 U/L   Hemoglobin A1c    Specimen: Blood   Result Value Ref Range    Hemoglobin A1C 5.20 4.80 - 5.60 %   Amylase    Specimen: Blood   Result Value Ref Range    Amylase 76 28 - 100 U/L   Lipid Panel    Specimen: Blood   Result Value Ref Range    Total Cholesterol 197 0 - 200 mg/dL    Triglycerides  116 0 - 150 mg/dL    HDL Cholesterol 43 40 - 60 mg/dL    LDL Cholesterol  133 (H) 0 - 100 mg/dL    VLDL Cholesterol 21 5 - 40 mg/dL    LDL/HDL Ratio 3.04    Comprehensive Metabolic Panel    Specimen: Blood   Result Value Ref Range    Glucose 85 65 - 99 mg/dL    BUN 19 6 - 20 mg/dL    Creatinine 1.09 0.76 - 1.27 mg/dL    Sodium 142 136 - 145 mmol/L    Potassium 4.9 3.5 - 5.2 mmol/L    Chloride 104 98 - 107 mmol/L    CO2 28.0 22.0 - 29.0 mmol/L    Calcium 9.4 8.6 - 10.5 mg/dL    Total Protein 7.1 6.0 - 8.5 g/dL    Albumin 4.2 3.5 - 5.2 g/dL    ALT (SGPT) 23 1 - 41 U/L    AST (SGOT) 16 1 - 40 U/L    Alkaline Phosphatase 77 39 - 117 U/L    Total Bilirubin 0.2 0.0 - 1.2 mg/dL    Globulin 2.9 gm/dL    A/G Ratio 1.4 g/dL    BUN/Creatinine Ratio 17.4 7.0 - 25.0    Anion Gap 10.0 5.0 - 15.0 mmol/L    eGFR 85.8 >60.0 mL/min/1.73   Results for orders placed or performed in visit on 02/20/23   CBC Auto Differential    Specimen: Blood   Result Value Ref Range    WBC 8.81 3.40 - 10.80 10*3/mm3    RBC 5.15 4.14 - 5.80 10*6/mm3    Hemoglobin 16.6 13.0 - 17.7 g/dL    Hematocrit 47.0 37.5 - 51.0 %    MCV 91.3 79.0 - 97.0 fL    MCH 32.2 26.6 - 33.0 pg    MCHC 35.3 31.5 - 35.7 g/dL    RDW 13.0 12.3 - 15.4 %    RDW-SD 43.9 37.0 - 54.0 fl    MPV 10.1 6.0 - 12.0 fL    Platelets 255 140 - 450 10*3/mm3    Neutrophil % 64.1 42.7 - 76.0 %    Lymphocyte % 25.5 19.6 - 45.3 %    Monocyte % 6.8 5.0 - 12.0 %    Eosinophil % 2.5 0.3 - 6.2 %    Basophil % 0.6 0.0 - 1.5 %    Immature Grans % 0.5 0.0 - 0.5 %    Neutrophils, Absolute 5.65 1.70 - 7.00 10*3/mm3    Lymphocytes, Absolute 2.25 0.70 - 3.10 10*3/mm3    Monocytes, Absolute 0.60 0.10 - 0.90 10*3/mm3    Eosinophils, Absolute 0.22 0.00 - 0.40 10*3/mm3    Basophils, Absolute 0.05 0.00 - 0.20 10*3/mm3    Immature Grans, Absolute 0.04 0.00 - 0.05 10*3/mm3    nRBC 0.0 0.0 - 0.2 /100 WBC   TSH    Specimen: Blood   Result Value Ref Range    TSH 2.360 0.270 - 4.200 uIU/mL   T4, Free    Specimen: Blood    Result Value Ref Range    Free T4 1.05 0.93 - 1.70 ng/dL   Lipid Panel    Specimen: Blood   Result Value Ref Range    Total Cholesterol 202 (H) 0 - 200 mg/dL    Triglycerides 137 0 - 150 mg/dL    HDL Cholesterol 42 40 - 60 mg/dL    LDL Cholesterol  135 (H) 0 - 100 mg/dL    VLDL Cholesterol 25 5 - 40 mg/dL    LDL/HDL Ratio 3.16    Comprehensive Metabolic Panel    Specimen: Blood   Result Value Ref Range    Glucose 113 (H) 65 - 99 mg/dL    BUN 17 6 - 20 mg/dL    Creatinine 1.09 0.76 - 1.27 mg/dL    Sodium 140 136 - 145 mmol/L    Potassium 4.6 3.5 - 5.2 mmol/L    Chloride 105 98 - 107 mmol/L    CO2 26.0 22.0 - 29.0 mmol/L    Calcium 9.0 8.6 - 10.5 mg/dL    Total Protein 6.9 6.0 - 8.5 g/dL    Albumin 4.3 3.5 - 5.2 g/dL    ALT (SGPT) 24 1 - 41 U/L    AST (SGOT) 15 1 - 40 U/L    Alkaline Phosphatase 78 39 - 117 U/L    Total Bilirubin 0.3 0.0 - 1.2 mg/dL    Globulin 2.6 gm/dL    A/G Ratio 1.7 g/dL    BUN/Creatinine Ratio 15.6 7.0 - 25.0    Anion Gap 9.0 5.0 - 15.0 mmol/L    eGFR 85.8 >60.0 mL/min/1.73     *Note: Due to a large number of results and/or encounters for the requested time period, some results have not been displayed. A complete set of results can be found in Results Review.

## 2023-08-18 ENCOUNTER — TELEPHONE (OUTPATIENT)
Dept: FAMILY MEDICINE CLINIC | Facility: CLINIC | Age: 43
End: 2023-08-18
Payer: COMMERCIAL

## 2023-08-18 ENCOUNTER — HOSPITAL ENCOUNTER (OUTPATIENT)
Facility: HOSPITAL | Age: 43
Setting detail: HOSPITAL OUTPATIENT SURGERY
Discharge: HOME | End: 2023-08-18
Attending: INTERNAL MEDICINE | Admitting: INTERNAL MEDICINE
Payer: COMMERCIAL

## 2023-08-18 ENCOUNTER — ANESTHESIA (OUTPATIENT)
Dept: GASTROENTEROLOGY | Facility: HOSPITAL | Age: 43
End: 2023-08-18
Payer: COMMERCIAL

## 2023-08-18 ENCOUNTER — ANESTHESIA EVENT (OUTPATIENT)
Dept: GASTROENTEROLOGY | Facility: HOSPITAL | Age: 43
End: 2023-08-18
Payer: COMMERCIAL

## 2023-08-18 VITALS
RESPIRATION RATE: 16 BRPM | DIASTOLIC BLOOD PRESSURE: 78 MMHG | HEIGHT: 68 IN | WEIGHT: 226 LBS | SYSTOLIC BLOOD PRESSURE: 123 MMHG | HEART RATE: 65 BPM | TEMPERATURE: 97 F | BODY MASS INDEX: 34.25 KG/M2 | OXYGEN SATURATION: 96 %

## 2023-08-18 DIAGNOSIS — R93.3 ABNORMAL FINDING ON GI TRACT IMAGING: ICD-10-CM

## 2023-08-18 DIAGNOSIS — K21.00 GASTROESOPHAGEAL REFLUX DISEASE WITH ESOPHAGITIS WITHOUT HEMORRHAGE: ICD-10-CM

## 2023-08-18 DIAGNOSIS — R10.13 EPIGASTRIC PAIN: ICD-10-CM

## 2023-08-18 DIAGNOSIS — R11.0 NAUSEA: ICD-10-CM

## 2023-08-18 PROCEDURE — 25010000002 PROPOFOL 10 MG/ML EMULSION

## 2023-08-18 PROCEDURE — 43239 EGD BIOPSY SINGLE/MULTIPLE: CPT | Performed by: INTERNAL MEDICINE

## 2023-08-18 RX ORDER — DEXTROSE AND SODIUM CHLORIDE 5; .45 G/100ML; G/100ML
30 INJECTION, SOLUTION INTRAVENOUS CONTINUOUS PRN
Status: DISCONTINUED | OUTPATIENT
Start: 2023-08-18 | End: 2023-08-18 | Stop reason: HOSPADM

## 2023-08-18 RX ORDER — LIDOCAINE HYDROCHLORIDE 20 MG/ML
INJECTION, SOLUTION INTRAVENOUS AS NEEDED
Status: DISCONTINUED | OUTPATIENT
Start: 2023-08-18 | End: 2023-08-18 | Stop reason: SURG

## 2023-08-18 RX ORDER — PROPOFOL 10 MG/ML
VIAL (ML) INTRAVENOUS AS NEEDED
Status: DISCONTINUED | OUTPATIENT
Start: 2023-08-18 | End: 2023-08-18 | Stop reason: SURG

## 2023-08-18 RX ADMIN — PROPOFOL 60 MG: 10 INJECTION, EMULSION INTRAVENOUS at 11:07

## 2023-08-18 RX ADMIN — LIDOCAINE HYDROCHLORIDE 100 MG: 20 INJECTION, SOLUTION INTRAVENOUS at 11:06

## 2023-08-18 RX ADMIN — DEXTROSE AND SODIUM CHLORIDE 30 ML/HR: 5; 450 INJECTION, SOLUTION INTRAVENOUS at 10:07

## 2023-08-18 RX ADMIN — PROPOFOL 70 MG: 10 INJECTION, EMULSION INTRAVENOUS at 11:06

## 2023-08-18 NOTE — H&P
Chief Complaint   Patient presents with    Abdominal Pain    Diarrhea    Heartburn    fatty  liver       ENDO PROCEDURE ORDERED: EGD n, abd pain, gerd    Subjective    Michel Harmon is a 42 y.o. male. he is here today for follow-up.    History of Present Illness I agree with the current note with no changes in the history.  Risks and benefits discussed with patient. Patient understands these and would like to proceed with procedure.       Dictation on: 08/17/2023  8:24 PM by: EVARISTO GARCÍA [[id number]]        The following portions of the patient's history were reviewed and updated as appropriate:   Past Medical History:   Diagnosis Date    Anxiety 2016    Asthma 2013    Sleep Apnea    Cholelithiasis 2013    Colon polyp 2018    Crohn's disease     Erectile dysfunction 2014    GERD (gastroesophageal reflux disease)     Headache 2016    Irritable bowel syndrome 2016    Kidney stone 2018    Pancreatitis 2017    Renal insufficiency 2022    3C    Sleep apnea     Stroke 2015    During ielloscectomy    Visual impairment 1985    Glasses     Past Surgical History:   Procedure Laterality Date    COLON SURGERY  2015    COLONOSCOPY      COLONOSCOPY N/A 10/07/2020    Procedure: COLONOSCOPY--look TI;  Surgeon: Wisam Carpio MD;  Location: [facility] ENDOSCOPY;  Service: Gastroenterology;  Laterality: N/A;    ENDOSCOPY N/A 10/07/2020    Procedure: ESOPHAGOGASTRODUODENOSCOPY--bx;  Surgeon: Wisam Carpio MD;  Location: [facility] ENDOSCOPY;  Service: Gastroenterology;  Laterality: N/A;    HERNIA REPAIR  1981    UPPER GASTROINTESTINAL ENDOSCOPY  10/07/2020     Family History   Problem Relation Age of Onset    Breast cancer Other     Brain cancer Other     Ulcers Other     Bleeding Disorder Other     Anxiety disorder Sister     Mental illness Sister         Involuntary commitment mid 90s    Cancer Maternal Aunt         Breast cancer then brain cancer       No Known Allergies  Social History     Socioeconomic History    Marital  status:    Tobacco Use    Smoking status: Every Day     Packs/day: 1.00     Years: 26.00     Pack years: 26.00     Types: Cigarettes     Start date: 1/1/1996    Smokeless tobacco: Never   Vaping Use    Vaping Use: Never used   Substance and Sexual Activity    Alcohol use: Not Currently    Drug use: Never    Sexual activity: Yes     Partners: Female     Birth control/protection: None     Comment: We're old     Current Medications:  Prior to Admission medications    Medication Sig Start Date End Date Taking? Authorizing Provider   amLODIPine (NORVASC) 5 MG tablet Take 1 tablet by mouth Daily. 4/27/23  Yes Dru Vicente MD   Aspirin-Salicylamide-Caffeine (BC HEADACHE POWDER PO) Take  by mouth 2 (Two) Times a Day As Needed.   Yes Sharon Verduzco MD   cyclobenzaprine (FLEXERIL) 5 MG tablet Take 1 tablet by mouth 3 (Three) Times a Day As Needed. 3/18/22  Yes Sharon Verduzco MD   esomeprazole (nexIUM) 40 MG capsule Take 1 capsule by mouth Every Morning Before Breakfast. 5/16/23  Yes Dru Vicente MD   gabapentin (NEURONTIN) 400 MG capsule  12/28/22  Yes Sharon Verduzco MD   glycopyrrolate (ROBINUL) 2 MG tablet Take 1 tablet by mouth 2 (Two) Times a Day. 1/10/23  Yes Dru Corrales PA-C   melatonin 5 MG tablet tablet Take 1 tablet by mouth As Needed.   Yes Sharon Verduzco MD   mesalamine (Apriso) 0.375 g 24 hr capsule Take 4 capsules by mouth Daily. 6/30/23  Yes Dru Corrales PA-C   nicotine (Nicoderm CQ) 21 MG/24HR patch Place 1 patch on the skin as directed by provider Daily. 4/27/23  Yes Dru Vicente MD   ondansetron ODT (Zofran ODT) 8 MG disintegrating tablet Place 1 tablet on the tongue Every 8 (Eight) Hours As Needed for Nausea or Vomiting. 8/1/23  Yes Dru Vicente MD   oxyCODONE-acetaminophen (PERCOCET)  MG per tablet Take 1 tablet by mouth 3 (Three) Times a Day. 8/6/20  Yes Sharon Verduzco MD   Rimegepant Sulfate (Nurtec) 75 MG tablet dispersible tablet  "Take 1 tablet by mouth Daily As Needed (migraine headache). 8/1/23  Yes Dru Vicente MD     Review of Systems  Review of Systems   Constitutional:  Positive for activity change, appetite change and fatigue. Negative for unexpected weight change.   HENT:  Negative for trouble swallowing.    Gastrointestinal:  Positive for abdominal pain, nausea and vomiting.        Objective    /92   Pulse 76   Ht 172.7 cm (68\")   Wt 103 kg (227 lb 12.8 oz)   BMI 34.64 kg/mý   Physical Exam  Vitals and nursing note reviewed.   Constitutional:       General: He is not in acute distress.     Appearance: He is well-developed. He is obese. He is ill-appearing.   HENT:      Head: Normocephalic and atraumatic.   Eyes:      Pupils: Pupils are equal, round, and reactive to light.   Cardiovascular:      Rate and Rhythm: Normal rate and regular rhythm.      Heart sounds: Normal heart sounds.   Pulmonary:      Effort: Pulmonary effort is normal.      Breath sounds: Normal breath sounds.   Abdominal:      General: Bowel sounds are normal. There is distension. There is no abdominal bruit.      Palpations: Abdomen is soft. Abdomen is not rigid. There is no shifting dullness or mass.      Tenderness: There is abdominal tenderness. There is no guarding or rebound.      Hernia: No hernia is present. There is no hernia in the ventral area.   Musculoskeletal:         General: Normal range of motion.      Cervical back: Normal range of motion.   Skin:     General: Skin is warm and dry.   Neurological:      Mental Status: He is alert and oriented to person, place, and time.   Psychiatric:         Behavior: Behavior normal.         Thought Content: Thought content normal.         Judgment: Judgment normal.     Assessment & Plan      1. Epigastric pain    2. Gastroesophageal reflux disease with esophagitis without hemorrhage    3. Nausea    4. Abnormal finding on GI tract imaging    .   Diagnoses and all orders for this visit:    1. " Epigastric pain (Primary)  -     Case Request; Standing  -     dextrose 5 % and sodium chloride 0.45 % infusion  -     Case Request  -     Ambulatory Referral to General Surgery    2. Gastroesophageal reflux disease with esophagitis without hemorrhage  -     Case Request; Standing  -     dextrose 5 % and sodium chloride 0.45 % infusion  -     Case Request  -     Ambulatory Referral to General Surgery    3. Nausea  -     Case Request; Standing  -     dextrose 5 % and sodium chloride 0.45 % infusion  -     Case Request  -     Ambulatory Referral to General Surgery    4. Abnormal finding on GI tract imaging  -     Case Request; Standing  -     dextrose 5 % and sodium chloride 0.45 % infusion  -     Case Request  -     Ambulatory Referral to General Surgery    Other orders  -     Obtain Informed Consent; Standing  -     Follow Anesthesia Guidelines / Protocol; Future  -     Obtain Informed Consent; Future  -     POC Glucose Once; Standing        Orders placed during this encounter include:  Orders Placed This Encounter   Procedures    Ambulatory Referral to General Surgery     Referral Priority:   Routine     Referral Type:   Consultation     Referral Reason:   Specialty Services Required     Referred to Provider:   Justino Quintana MD     Requested Specialty:   General Surgery     Number of Visits Requested:   1    Obtain Informed Consent     Standing Status:   Future     Order Specific Question:   Informed Consent Given For     Answer:   ESOPHAGOGASTRODUODENOSCOPY       Medications prescribed:  No orders of the defined types were placed in this encounter.      Requested Prescriptions      No prescriptions requested or ordered in this encounter       Review and/or summary of lab tests, radiology, procedures, medications. Review and summary of old records and obtaining of history. The risks and benefits of my recommendations, as well as other treatment options were discussed with the patient today. Questions were  answered.    Follow-up: Return if symptoms worsen or fail to improve, for After the above.     ESOPHAGOGASTRODUODENOSCOPY (N/A)      This document has been electronically signed by Dru Corrales PA-C on August 17, 2023 20:20 CDT      Results for orders placed or performed in visit on 08/17/23   Protime-INR    Specimen: Blood   Result Value Ref Range    Protime 12.7 11.1 - 15.3 Seconds    INR 0.95 0.80 - 1.20   Lipase    Specimen: Blood   Result Value Ref Range    Lipase 37 13 - 60 U/L   Amylase    Specimen: Blood   Result Value Ref Range    Amylase 77 28 - 100 U/L   Results for orders placed or performed in visit on 03/30/23   CBC Auto Differential    Specimen: Blood   Result Value Ref Range    WBC 9.27 3.40 - 10.80 10*3/mm3    RBC 4.99 4.14 - 5.80 10*6/mm3    Hemoglobin 15.9 13.0 - 17.7 g/dL    Hematocrit 45.0 37.5 - 51.0 %    MCV 90.2 79.0 - 97.0 fL    MCH 31.9 26.6 - 33.0 pg    MCHC 35.3 31.5 - 35.7 g/dL    RDW 12.8 12.3 - 15.4 %    RDW-SD 42.5 37.0 - 54.0 fl    MPV 10.2 6.0 - 12.0 fL    Platelets 308 140 - 450 10*3/mm3    Neutrophil % 60.6 42.7 - 76.0 %    Lymphocyte % 27.7 19.6 - 45.3 %    Monocyte % 7.3 5.0 - 12.0 %    Eosinophil % 3.1 0.3 - 6.2 %    Basophil % 0.9 0.0 - 1.5 %    Immature Grans % 0.4 0.0 - 0.5 %    Neutrophils, Absolute 5.61 1.70 - 7.00 10*3/mm3    Lymphocytes, Absolute 2.57 0.70 - 3.10 10*3/mm3    Monocytes, Absolute 0.68 0.10 - 0.90 10*3/mm3    Eosinophils, Absolute 0.29 0.00 - 0.40 10*3/mm3    Basophils, Absolute 0.08 0.00 - 0.20 10*3/mm3    Immature Grans, Absolute 0.04 0.00 - 0.05 10*3/mm3    nRBC 0.0 0.0 - 0.2 /100 WBC   Lipase    Specimen: Blood   Result Value Ref Range    Lipase 44 13 - 60 U/L   Hemoglobin A1c    Specimen: Blood   Result Value Ref Range    Hemoglobin A1C 5.20 4.80 - 5.60 %   Amylase    Specimen: Blood   Result Value Ref Range    Amylase 76 28 - 100 U/L   Lipid Panel    Specimen: Blood   Result Value Ref Range    Total Cholesterol 197 0 - 200 mg/dL    Triglycerides  116 0 - 150 mg/dL    HDL Cholesterol 43 40 - 60 mg/dL    LDL Cholesterol  133 (H) 0 - 100 mg/dL    VLDL Cholesterol 21 5 - 40 mg/dL    LDL/HDL Ratio 3.04    Comprehensive Metabolic Panel    Specimen: Blood   Result Value Ref Range    Glucose 85 65 - 99 mg/dL    BUN 19 6 - 20 mg/dL    Creatinine 1.09 0.76 - 1.27 mg/dL    Sodium 142 136 - 145 mmol/L    Potassium 4.9 3.5 - 5.2 mmol/L    Chloride 104 98 - 107 mmol/L    CO2 28.0 22.0 - 29.0 mmol/L    Calcium 9.4 8.6 - 10.5 mg/dL    Total Protein 7.1 6.0 - 8.5 g/dL    Albumin 4.2 3.5 - 5.2 g/dL    ALT (SGPT) 23 1 - 41 U/L    AST (SGOT) 16 1 - 40 U/L    Alkaline Phosphatase 77 39 - 117 U/L    Total Bilirubin 0.2 0.0 - 1.2 mg/dL    Globulin 2.9 gm/dL    A/G Ratio 1.4 g/dL    BUN/Creatinine Ratio 17.4 7.0 - 25.0    Anion Gap 10.0 5.0 - 15.0 mmol/L    eGFR 85.8 >60.0 mL/min/1.73   Results for orders placed or performed in visit on 02/20/23   CBC Auto Differential    Specimen: Blood   Result Value Ref Range    WBC 8.81 3.40 - 10.80 10*3/mm3    RBC 5.15 4.14 - 5.80 10*6/mm3    Hemoglobin 16.6 13.0 - 17.7 g/dL    Hematocrit 47.0 37.5 - 51.0 %    MCV 91.3 79.0 - 97.0 fL    MCH 32.2 26.6 - 33.0 pg    MCHC 35.3 31.5 - 35.7 g/dL    RDW 13.0 12.3 - 15.4 %    RDW-SD 43.9 37.0 - 54.0 fl    MPV 10.1 6.0 - 12.0 fL    Platelets 255 140 - 450 10*3/mm3    Neutrophil % 64.1 42.7 - 76.0 %    Lymphocyte % 25.5 19.6 - 45.3 %    Monocyte % 6.8 5.0 - 12.0 %    Eosinophil % 2.5 0.3 - 6.2 %    Basophil % 0.6 0.0 - 1.5 %    Immature Grans % 0.5 0.0 - 0.5 %    Neutrophils, Absolute 5.65 1.70 - 7.00 10*3/mm3    Lymphocytes, Absolute 2.25 0.70 - 3.10 10*3/mm3    Monocytes, Absolute 0.60 0.10 - 0.90 10*3/mm3    Eosinophils, Absolute 0.22 0.00 - 0.40 10*3/mm3    Basophils, Absolute 0.05 0.00 - 0.20 10*3/mm3    Immature Grans, Absolute 0.04 0.00 - 0.05 10*3/mm3    nRBC 0.0 0.0 - 0.2 /100 WBC   TSH    Specimen: Blood   Result Value Ref Range    TSH 2.360 0.270 - 4.200 uIU/mL   T4, Free    Specimen: Blood    Result Value Ref Range    Free T4 1.05 0.93 - 1.70 ng/dL   Lipid Panel    Specimen: Blood   Result Value Ref Range    Total Cholesterol 202 (H) 0 - 200 mg/dL    Triglycerides 137 0 - 150 mg/dL    HDL Cholesterol 42 40 - 60 mg/dL    LDL Cholesterol  135 (H) 0 - 100 mg/dL    VLDL Cholesterol 25 5 - 40 mg/dL    LDL/HDL Ratio 3.16    Comprehensive Metabolic Panel    Specimen: Blood   Result Value Ref Range    Glucose 113 (H) 65 - 99 mg/dL    BUN 17 6 - 20 mg/dL    Creatinine 1.09 0.76 - 1.27 mg/dL    Sodium 140 136 - 145 mmol/L    Potassium 4.6 3.5 - 5.2 mmol/L    Chloride 105 98 - 107 mmol/L    CO2 26.0 22.0 - 29.0 mmol/L    Calcium 9.0 8.6 - 10.5 mg/dL    Total Protein 6.9 6.0 - 8.5 g/dL    Albumin 4.3 3.5 - 5.2 g/dL    ALT (SGPT) 24 1 - 41 U/L    AST (SGOT) 15 1 - 40 U/L    Alkaline Phosphatase 78 39 - 117 U/L    Total Bilirubin 0.3 0.0 - 1.2 mg/dL    Globulin 2.6 gm/dL    A/G Ratio 1.7 g/dL    BUN/Creatinine Ratio 15.6 7.0 - 25.0    Anion Gap 9.0 5.0 - 15.0 mmol/L    eGFR 85.8 >60.0 mL/min/1.73     *Note: Due to a large number of results and/or encounters for the requested time period, some results have not been displayed. A complete set of results can be found in Results Review.

## 2023-08-18 NOTE — ANESTHESIA POSTPROCEDURE EVALUATION
Patient: Michel Harmon    Procedure Summary       Date: 08/18/23 Room / Location: [facility] ENDOSCOPY 3 / [facility] ENDOSCOPY    Anesthesia Start: 1104 Anesthesia Stop: 1114    Procedure: ESOPHAGOGASTRODUODENOSCOPY Diagnosis:       Epigastric pain      Gastroesophageal reflux disease with esophagitis without hemorrhage      Nausea      Abnormal finding on GI tract imaging      (Epigastric pain [R10.13])      (Gastroesophageal reflux disease with esophagitis without hemorrhage [K21.00])      (Nausea [R11.0])      (Abnormal finding on GI tract imaging [R93.3])    Surgeons: Wisam Carpio MD Provider: Akosua Ascencio CRNA    Anesthesia Type: general ASA Status: 3            Anesthesia Type: general    Vitals  No vitals data found for the desired time range.          Post Anesthesia Care and Evaluation    Patient location during evaluation: PHASE II  Patient participation: complete - patient cannot participate  Level of consciousness: sleepy but conscious  Pain score: 0  Pain management: adequate    Airway patency: patent  Anesthetic complications: No anesthetic complications  PONV Status: none  Cardiovascular status: acceptable  Respiratory status: acceptable  Hydration status: acceptable  No anesthesia care post op

## 2023-08-18 NOTE — ANESTHESIA PREPROCEDURE EVALUATION
Anesthesia Evaluation     Patient summary reviewed and Nursing notes reviewed   NPO Solid Status: > 8 hours  NPO Liquid Status: > 4 hours           Airway   Mallampati: II  Dental - normal exam     Pulmonary - normal exam   (+) a smoker Former, asthma,sleep apnea  Cardiovascular - normal exam    (+) hypertension well controlled less than 2 medications      Neuro/Psych  (+) CVA, headaches, psychiatric history Anxiety  GI/Hepatic/Renal/Endo    (+) obesity, GERD poorly controlled, renal disease CRI    ROS Comment: Crohn's disease    Musculoskeletal     Abdominal   (+) obese   Substance History      OB/GYN          Other   arthritis,                   Anesthesia Plan    ASA 3     general     intravenous induction       CODE STATUS:

## 2023-08-18 NOTE — TELEPHONE ENCOUNTER
----- Message from Dru Vicente MD sent at 8/17/2023 10:04 PM CDT -----  Pancreatic enzymes normal    Pt has appt with General Surgery for cholecystectomy soon.

## 2023-08-21 ENCOUNTER — OFFICE VISIT (OUTPATIENT)
Dept: SURGERY | Facility: CLINIC | Age: 43
End: 2023-08-21
Payer: COMMERCIAL

## 2023-08-21 VITALS
OXYGEN SATURATION: 96 % | SYSTOLIC BLOOD PRESSURE: 122 MMHG | DIASTOLIC BLOOD PRESSURE: 72 MMHG | BODY MASS INDEX: 34.43 KG/M2 | TEMPERATURE: 97.2 F | WEIGHT: 227.2 LBS | HEIGHT: 68 IN | HEART RATE: 75 BPM

## 2023-08-21 DIAGNOSIS — K82.8 ADENOMYOSIS OF GALLBLADDER: Primary | ICD-10-CM

## 2023-08-21 LAB
A2 MACROGLOB SERPL-MCNC: 136 MG/DL (ref 110–276)
ALT SERPL W P-5'-P-CCNC: 29 IU/L (ref 0–55)
APO A-I SERPL-MCNC: 122 MG/DL (ref 101–178)
AST SERPL W P-5'-P-CCNC: 23 IU/L (ref 0–40)
BILIRUB SERPL-MCNC: 0.3 MG/DL (ref 0–1.2)
CHOLEST SERPL-MCNC: 181 MG/DL (ref 100–199)
FIBROSIS SCORING:: NORMAL
FIBROSIS STAGE SERPL QL: NORMAL
GGT SERPL-CCNC: 20 IU/L (ref 0–65)
GLUCOSE SERPL-MCNC: 79 MG/DL (ref 70–99)
HAPTOGLOB SERPL-MCNC: 165 MG/DL (ref 23–355)
LABORATORY COMMENT REPORT: NORMAL
LIVER FIBR SCORE SERPL CALC.FIBROSURE: 0.07 (ref 0–0.21)
LIVER STEATOSIS GRADE SERPL QL: NORMAL
LIVER STEATOSIS SCORE SERPL: 0.37 (ref 0–0.4)
NASH GRADE SERPL QL: NORMAL
NASH INTERPRETATION SERPL-IMP: NORMAL
NASH SCORE SERPL: 0 (ref 0–0.25)
NASH SCORING: NORMAL
REF LAB TEST METHOD: NORMAL
STEATOSIS SCORING: NORMAL
TEST PERFORMANCE INFO SPEC: NORMAL
TEST PERFORMANCE INFO SPEC: NORMAL
TRIGL SERPL-MCNC: 131 MG/DL (ref 0–149)

## 2023-08-21 PROCEDURE — 99214 OFFICE O/P EST MOD 30 MIN: CPT | Performed by: STUDENT IN AN ORGANIZED HEALTH CARE EDUCATION/TRAINING PROGRAM

## 2023-08-21 RX ORDER — INDOCYANINE GREEN AND WATER 25 MG
5 KIT INJECTION ONCE
OUTPATIENT
Start: 2023-08-21 | End: 2023-08-21

## 2023-08-21 NOTE — H&P (VIEW-ONLY)
Subjective   Michel Harmon is a 42 y.o. male     Chief Complaint: Abdominal pain    History of Present Illness  43 yo gentleman with abdominal pain. He has a history of gallstones and non-vis on his HIDA. He also has symptoms consistent with peptic ulcer disease and underwent an EGD which did not demonstrate any ulcers.      Review of Systems   Gastrointestinal:  Positive for abdominal distention, abdominal pain, constipation, diarrhea, nausea and vomiting.   Past Medical History:   Diagnosis Date    Anxiety 2016    Cholelithiasis 2013    Colon polyp 2018    Crohn's disease     Erectile dysfunction 2014    GERD (gastroesophageal reflux disease)     Headache 2016    Irritable bowel syndrome 2016    Kidney stone 2018    Pancreatitis 2017    Renal insufficiency 2022    3C    Sleep apnea     Stroke 2015    During ielloscectomy    Visual impairment 1985    Glasses     Past Surgical History:   Procedure Laterality Date    COLON SURGERY  2015    COLONOSCOPY      COLONOSCOPY N/A 10/07/2020    Procedure: COLONOSCOPY--look TI;  Surgeon: Wisam Caprio MD;  Location: [facility] ENDOSCOPY;  Service: Gastroenterology;  Laterality: N/A;    ENDOSCOPY N/A 10/07/2020    Procedure: ESOPHAGOGASTRODUODENOSCOPY--bx;  Surgeon: Wisam Carpio MD;  Location: [facility] ENDOSCOPY;  Service: Gastroenterology;  Laterality: N/A;    HERNIA REPAIR  1981    UPPER GASTROINTESTINAL ENDOSCOPY  10/07/2020     Family History   Problem Relation Age of Onset    Breast cancer Other     Brain cancer Other     Ulcers Other     Bleeding Disorder Other     Anxiety disorder Sister     Mental illness Sister         Involuntary commitment mid 90s    Cancer Maternal Aunt         Breast cancer then brain cancer     Social History     Socioeconomic History    Marital status:    Tobacco Use    Smoking status: Every Day     Packs/day: 0.50      Years: 26.00     Pack years: 13.00     Types: Cigarettes     Start date: 1/1/1996    Smokeless tobacco: Never   Vaping Use    Vaping Use: Never used   Substance and Sexual Activity    Alcohol use: Not Currently    Drug use: Never    Sexual activity: Yes     Partners: Female     Birth control/protection: None     Comment: We're old     No Known Allergies  Vitals:    08/21/23 0923   BP: 122/72   Pulse: 75   Temp: 97.2 °F (36.2 °C)   SpO2: 96%       Home Medications:  Prior to Admission medications    Medication Sig Start Date End Date Taking? Authorizing Provider   amLODIPine (NORVASC) 5 MG tablet Take 1 tablet by mouth Daily. 4/27/23  Yes Dru Vicente MD   Aspirin-Salicylamide-Caffeine (BC HEADACHE POWDER PO) Take  by mouth 2 (Two) Times a Day As Needed.   Yes ProviderSharon MD   cyclobenzaprine (FLEXERIL) 5 MG tablet Take 1 tablet by mouth 3 (Three) Times a Day As Needed. 3/18/22  Yes Sharon Verduzco MD   esomeprazole (nexIUM) 40 MG capsule Take 1 capsule by mouth Every Morning Before Breakfast. 5/16/23  Yes Dru Vicente MD   gabapentin (NEURONTIN) 400 MG capsule  12/28/22  Yes Sharon Verduzco MD   glycopyrrolate (ROBINUL) 2 MG tablet Take 1 tablet by mouth 2 (Two) Times a Day. 1/10/23  Yes Dru Corrales PA-C   melatonin 5 MG tablet tablet Take 2 tablets by mouth As Needed.   Yes Sharon Verduzco MD   mesalamine (Apriso) 0.375 g 24 hr capsule Take 4 capsules by mouth Daily. 6/30/23  Yes Dru Corrales PA-C   nicotine (Nicoderm CQ) 21 MG/24HR patch Place 1 patch on the skin as directed by provider Daily. 4/27/23  Yes Dru Vicente MD   ondansetron ODT (Zofran ODT) 8 MG disintegrating tablet Place 1 tablet on the tongue Every 8 (Eight) Hours As Needed for Nausea or Vomiting. 8/1/23  Yes Dru Vicente MD   oxyCODONE-acetaminophen (PERCOCET)  MG per tablet Take 1 tablet by mouth 3 (Three) Times a Day. 8/6/20  Yes Sharon Verduzco MD   Rimegepant Sulfate  (Nurtec) 75 MG tablet dispersible tablet Take 1 tablet by mouth Daily As Needed (migraine headache). 8/1/23  Yes Dru Vicente MD       Objective   Physical Exam  Constitutional:       Appearance: Normal appearance.   HENT:      Head: Normocephalic and atraumatic.      Nose: Nose normal. No congestion.      Mouth/Throat:      Mouth: Mucous membranes are moist.      Pharynx: Oropharynx is clear.   Eyes:      General: No scleral icterus.     Pupils: Pupils are equal, round, and reactive to light.   Cardiovascular:      Rate and Rhythm: Normal rate and regular rhythm.   Pulmonary:      Effort: Pulmonary effort is normal. No respiratory distress.   Abdominal:      Tenderness: There is abdominal tenderness.   Skin:     General: Skin is warm and dry.   Neurological:      General: No focal deficit present.      Mental Status: He is alert and oriented to person, place, and time.   Psychiatric:         Mood and Affect: Mood normal.         Behavior: Behavior normal.       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    - Will plan on robotic assisted cholecystectomy given that his EGD did not demonstrate any significant abnormality.  I have counseled the patient about the nature of the problem, the natural history of the disease, the risks and benefits of surgery, the expected recovery, and the alternatives to surgery.  After this discussion, they were given an opportunity to ask questions, have an understanding of diagnosis and treatment options, and wish to proceed with surgery.                    This document has been electronically signed by Justino Quintana MD on August 24, 2023 07:45 CDT

## 2023-08-21 NOTE — PROGRESS NOTES
Subjective   Michel Harmon is a 42 y.o. male     Chief Complaint: Abdominal pain    History of Present Illness  43 yo gentleman with abdominal pain. He has a history of gallstones and non-vis on his HIDA. He also has symptoms consistent with peptic ulcer disease and underwent an EGD which did not demonstrate any ulcers.      Review of Systems   Gastrointestinal:  Positive for abdominal distention, abdominal pain, constipation, diarrhea, nausea and vomiting.   Past Medical History:   Diagnosis Date    Anxiety 2016    Cholelithiasis 2013    Colon polyp 2018    Crohn's disease     Erectile dysfunction 2014    GERD (gastroesophageal reflux disease)     Headache 2016    Irritable bowel syndrome 2016    Kidney stone 2018    Pancreatitis 2017    Renal insufficiency 2022    3C    Sleep apnea     Stroke 2015    During ielloscectomy    Visual impairment 1985    Glasses     Past Surgical History:   Procedure Laterality Date    COLON SURGERY  2015    COLONOSCOPY      COLONOSCOPY N/A 10/07/2020    Procedure: COLONOSCOPY--look TI;  Surgeon: Wisam Carpio MD;  Location: [facility] ENDOSCOPY;  Service: Gastroenterology;  Laterality: N/A;    ENDOSCOPY N/A 10/07/2020    Procedure: ESOPHAGOGASTRODUODENOSCOPY--bx;  Surgeon: Wisam Carpio MD;  Location: [facility] ENDOSCOPY;  Service: Gastroenterology;  Laterality: N/A;    HERNIA REPAIR  1981    UPPER GASTROINTESTINAL ENDOSCOPY  10/07/2020     Family History   Problem Relation Age of Onset    Breast cancer Other     Brain cancer Other     Ulcers Other     Bleeding Disorder Other     Anxiety disorder Sister     Mental illness Sister         Involuntary commitment mid 90s    Cancer Maternal Aunt         Breast cancer then brain cancer     Social History     Socioeconomic History    Marital status:    Tobacco Use    Smoking status: Every Day     Packs/day: 0.50      Years: 26.00     Pack years: 13.00     Types: Cigarettes     Start date: 1/1/1996    Smokeless tobacco: Never   Vaping Use    Vaping Use: Never used   Substance and Sexual Activity    Alcohol use: Not Currently    Drug use: Never    Sexual activity: Yes     Partners: Female     Birth control/protection: None     Comment: We're old     No Known Allergies  Vitals:    08/21/23 0923   BP: 122/72   Pulse: 75   Temp: 97.2 øF (36.2 øC)   SpO2: 96%       Home Medications:  Prior to Admission medications    Medication Sig Start Date End Date Taking? Authorizing Provider   amLODIPine (NORVASC) 5 MG tablet Take 1 tablet by mouth Daily. 4/27/23  Yes Dru Vicente MD   Aspirin-Salicylamide-Caffeine (BC HEADACHE POWDER PO) Take  by mouth 2 (Two) Times a Day As Needed.   Yes Sharon Verduzco MD   cyclobenzaprine (FLEXERIL) 5 MG tablet Take 1 tablet by mouth 3 (Three) Times a Day As Needed. 3/18/22  Yes Sharon Verduzco MD   esomeprazole (nexIUM) 40 MG capsule Take 1 capsule by mouth Every Morning Before Breakfast. 5/16/23  Yes Dru Vicente MD   gabapentin (NEURONTIN) 400 MG capsule  12/28/22  Yes Sharon Verduzco MD   glycopyrrolate (ROBINUL) 2 MG tablet Take 1 tablet by mouth 2 (Two) Times a Day. 1/10/23  Yes Dru Corrales PA-C   melatonin 5 MG tablet tablet Take 2 tablets by mouth As Needed.   Yes Sharon Verduzco MD   mesalamine (Apriso) 0.375 g 24 hr capsule Take 4 capsules by mouth Daily. 6/30/23  Yes Dru Corrales PA-C   nicotine (Nicoderm CQ) 21 MG/24HR patch Place 1 patch on the skin as directed by provider Daily. 4/27/23  Yes Dru Vicente MD   ondansetron ODT (Zofran ODT) 8 MG disintegrating tablet Place 1 tablet on the tongue Every 8 (Eight) Hours As Needed for Nausea or Vomiting. 8/1/23  Yes Dru Vicente MD   oxyCODONE-acetaminophen (PERCOCET)  MG per tablet Take 1 tablet by mouth 3 (Three) Times a Day. 8/6/20  Yes Sharon Verduzco MD   Rimegepant Sulfate  (Nurtec) 75 MG tablet dispersible tablet Take 1 tablet by mouth Daily As Needed (migraine headache). 8/1/23  Yes Dru Vicente MD       Objective   Physical Exam  Constitutional:       Appearance: Normal appearance.   HENT:      Head: Normocephalic and atraumatic.      Nose: Nose normal. No congestion.      Mouth/Throat:      Mouth: Mucous membranes are moist.      Pharynx: Oropharynx is clear.   Eyes:      General: No scleral icterus.     Pupils: Pupils are equal, round, and reactive to light.   Cardiovascular:      Rate and Rhythm: Normal rate and regular rhythm.   Pulmonary:      Effort: Pulmonary effort is normal. No respiratory distress.   Abdominal:      Tenderness: There is abdominal tenderness.   Skin:     General: Skin is warm and dry.   Neurological:      General: No focal deficit present.      Mental Status: He is alert and oriented to person, place, and time.   Psychiatric:         Mood and Affect: Mood normal.         Behavior: Behavior normal.       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    - Will plan on robotic assisted cholecystectomy given that his EGD did not demonstrate any significant abnormality.  I have counseled the patient about the nature of the problem, the natural history of the disease, the risks and benefits of surgery, the expected recovery, and the alternatives to surgery.  After this discussion, they were given an opportunity to ask questions, have an understanding of diagnosis and treatment options, and wish to proceed with surgery.                    This document has been electronically signed by Justino Quintana MD on August 24, 2023 07:45 CDT

## 2023-08-24 ENCOUNTER — OFFICE VISIT (OUTPATIENT)
Dept: GASTROENTEROLOGY | Facility: CLINIC | Age: 43
End: 2023-08-24
Payer: COMMERCIAL

## 2023-08-24 VITALS
DIASTOLIC BLOOD PRESSURE: 90 MMHG | SYSTOLIC BLOOD PRESSURE: 135 MMHG | HEART RATE: 84 BPM | HEIGHT: 68 IN | BODY MASS INDEX: 34.37 KG/M2 | WEIGHT: 226.8 LBS

## 2023-08-24 DIAGNOSIS — K76.0 FATTY LIVER: ICD-10-CM

## 2023-08-24 DIAGNOSIS — R11.0 NAUSEA: Primary | ICD-10-CM

## 2023-08-24 DIAGNOSIS — R10.13 EPIGASTRIC PAIN: ICD-10-CM

## 2023-08-24 DIAGNOSIS — K21.00 GASTROESOPHAGEAL REFLUX DISEASE WITH ESOPHAGITIS WITHOUT HEMORRHAGE: ICD-10-CM

## 2023-08-24 RX ORDER — FAMOTIDINE 40 MG/1
40 TABLET, FILM COATED ORAL 2 TIMES DAILY PRN
Qty: 60 TABLET | Refills: 2 | Status: SHIPPED | OUTPATIENT
Start: 2023-08-24

## 2023-08-24 NOTE — PROGRESS NOTES
Chief Complaint   Patient presents with    endo f/u    Abdominal Pain    Heartburn    Nausea       ENDO PROCEDURE ORDERED:    Subjective    Michel Harmon is a 42 y.o. male. he is here today for follow-up.    History of Present Illness    Patient is seen on a recheck of his epigastric pain, nausea, GERD, abdominal pain, previous ileitis. Last seen on 08/17/2023. Had normal amylase and lipase. COREAS FibroSURE was negative. INR 0.95. He did see Dr. Quintana on 08/21/2023 and is scheduled for a cholecystectomy on 09/01/2023. He underwent EGD on 08/18/2023 that showed grade 3 esophagitis, diffuse gastritis. Distal esophageal biopsy showed reflux esophagitis, negative for eosinophilia, negative for De La Garza's. Antral biopsy showed reactive gastropathy, negative for H. pylori. Small bowel biopsy was negative. The patient complains of 2 out of 10 abdominal pain. He is on the Nexium 40 mg daily, Robinul 2 mg b.i.d. and Apriso 4 daily for his ileitis. He states he did get back on his medication after his pharmacy had closed and he had to try to get them renewed elsewhere. Weight is down 1 pound since last visit. He had a prior EGD/colonoscopy on 10/07/2020.     ASSESSMENT/PLAN:  Patient with symptomatic gallbladder with significant esophagitis, gastritis. Will continue on the Nexium 40 mg daily. Encouraged dietary modification and weight loss. We will try adding Pepcid 40 mg b.i.d. as needed for breakthrough GERD. I encouraged him to continue dietary modification and weight loss. Will keep his regularly scheduled follow-up with further laboratory depending on the results of the above. He was encouraged to keep his appointment for cholecystectomy.         The following portions of the patient's history were reviewed and updated as appropriate:   Past Medical History:   Diagnosis Date    Anxiety 2016    Cholelithiasis 2013    Colon polyp 2018    Crohn's disease     Erectile dysfunction 2014    GERD (gastroesophageal reflux  disease)     Headache 2016    Hypertension     Irritable bowel syndrome 2016    Kidney stone 2018    Migraine     Pancreatitis 2017    Renal insufficiency 2022    3C    Sleep apnea     Stroke 2015    During ielloscectomy    Visual impairment 1985    Glasses     Past Surgical History:   Procedure Laterality Date    CHOLECYSTECTOMY      COLON SURGERY  2015    ileoscectomy    COLONOSCOPY      COLONOSCOPY N/A 10/07/2020    Procedure: COLONOSCOPY--look TI;  Surgeon: Wisam Carpio MD;  Location: [facility] ENDOSCOPY;  Service: Gastroenterology;  Laterality: N/A;    ENDOSCOPY N/A 10/07/2020    Procedure: ESOPHAGOGASTRODUODENOSCOPY--bx;  Surgeon: Wisam Carpio MD;  Location: [facility] ENDOSCOPY;  Service: Gastroenterology;  Laterality: N/A;    ENDOSCOPY N/A 08/18/2023    Procedure: ESOPHAGOGASTRODUODENOSCOPY;  Surgeon: Wisam Carpio MD;  Location: [facility] ENDOSCOPY;  Service: Gastroenterology;  Laterality: N/A;    HERNIA REPAIR  1981    UPPER GASTROINTESTINAL ENDOSCOPY  10/07/2020     Family History   Problem Relation Age of Onset    Breast cancer Other     Brain cancer Other     Ulcers Other     Bleeding Disorder Other     Anxiety disorder Sister     Mental illness Sister         Involuntary commitment mid 90s    Cancer Maternal Aunt         Breast cancer then brain cancer       Allergies   Allergen Reactions    Hydrocodone Itching     Social History     Socioeconomic History    Marital status:    Tobacco Use    Smoking status: Every Day     Packs/day: 0.50     Years: 26.00     Pack years: 13.00     Types: Cigarettes     Start date: 1/1/1996    Smokeless tobacco: Never   Vaping Use    Vaping Use: Never used   Substance and Sexual Activity    Alcohol use: Not Currently    Drug use: Never    Sexual activity: Defer     Partners: Female     Birth control/protection: None     Current Medications:  Prior to Admission medications    Medication Sig Start Date End Date Taking? Authorizing Provider   amLODIPine (NORVASC) 5  "MG tablet Take 1 tablet by mouth Daily. 4/27/23  Yes Dru Vicente MD   Aspirin-Salicylamide-Caffeine (BC HEADACHE POWDER PO) Take  by mouth 2 (Two) Times a Day As Needed.   Yes ProviderSharon MD   cyclobenzaprine (FLEXERIL) 5 MG tablet Take 1 tablet by mouth 3 (Three) Times a Day As Needed. 3/18/22  Yes Sharon Verduzco MD   esomeprazole (nexIUM) 40 MG capsule Take 1 capsule by mouth Every Morning Before Breakfast. 5/16/23  Yes Dru Vicente MD   gabapentin (NEURONTIN) 400 MG capsule  12/28/22  Yes ProviderSharon MD   glycopyrrolate (ROBINUL) 2 MG tablet Take 1 tablet by mouth 2 (Two) Times a Day. 1/10/23  Yes Dru Corrales PA-C   melatonin 5 MG tablet tablet Take 2 tablets by mouth As Needed.   Yes Sharon Verduzco MD   mesalamine (Apriso) 0.375 g 24 hr capsule Take 4 capsules by mouth Daily. 6/30/23  Yes Dru Corrales PA-C   nicotine (Nicoderm CQ) 21 MG/24HR patch Place 1 patch on the skin as directed by provider Daily. 4/27/23  Yes Dru Vicente MD   ondansetron ODT (Zofran ODT) 8 MG disintegrating tablet Place 1 tablet on the tongue Every 8 (Eight) Hours As Needed for Nausea or Vomiting. 8/1/23  Yes Dru Vicente MD   oxyCODONE-acetaminophen (PERCOCET)  MG per tablet Take 1 tablet by mouth 3 (Three) Times a Day. 8/6/20  Yes ProviderSharon MD   Rimegepant Sulfate (Nurtec) 75 MG tablet dispersible tablet Take 1 tablet by mouth Daily As Needed (migraine headache). 8/1/23  Yes Dru Vicente MD     Review of Systems  Review of Systems       Objective    /90   Pulse 84   Ht 172.7 cm (68\")   Wt 103 kg (226 lb 12.8 oz)   BMI 34.48 kg/m²   Physical Exam  Vitals and nursing note reviewed.   Constitutional:       General: He is not in acute distress.     Appearance: He is well-developed. He is ill-appearing.   HENT:      Head: Normocephalic and atraumatic.   Eyes:      Pupils: Pupils are equal, round, and reactive to light.   Cardiovascular:      Rate " and Rhythm: Normal rate and regular rhythm.      Heart sounds: Normal heart sounds.   Pulmonary:      Effort: Pulmonary effort is normal.      Breath sounds: Normal breath sounds.   Abdominal:      General: Bowel sounds are normal. There is no distension or abdominal bruit.      Palpations: Abdomen is soft. Abdomen is not rigid. There is no shifting dullness or mass.      Tenderness: There is abdominal tenderness. There is no guarding or rebound.      Hernia: No hernia is present. There is no hernia in the ventral area.   Musculoskeletal:         General: Normal range of motion.      Cervical back: Normal range of motion.   Skin:     General: Skin is warm and dry.   Neurological:      Mental Status: He is alert and oriented to person, place, and time.   Psychiatric:         Behavior: Behavior normal.         Thought Content: Thought content normal.         Judgment: Judgment normal.     Assessment & Plan      1. Nausea    2. Epigastric pain    3. Gastroesophageal reflux disease with esophagitis without hemorrhage    4. Fatty liver    .   Diagnoses and all orders for this visit:    1. Nausea (Primary)    2. Epigastric pain    3. Gastroesophageal reflux disease with esophagitis without hemorrhage    4. Fatty liver    Other orders  -     famotidine (Pepcid) 40 MG tablet; Take 1 tablet by mouth 2 (Two) Times a Day As Needed for Heartburn.  Dispense: 60 tablet; Refill: 2        Orders placed during this encounter include:  No orders of the defined types were placed in this encounter.      Medications prescribed:  New Medications Ordered This Visit   Medications    famotidine (Pepcid) 40 MG tablet     Sig: Take 1 tablet by mouth 2 (Two) Times a Day As Needed for Heartburn.     Dispense:  60 tablet     Refill:  2       Requested Prescriptions     Signed Prescriptions Disp Refills    famotidine (Pepcid) 40 MG tablet 60 tablet 2     Sig: Take 1 tablet by mouth 2 (Two) Times a Day As Needed for Heartburn.       Review and/or  summary of lab tests, radiology, procedures, medications. Review and summary of old records and obtaining of history. The risks and benefits of my recommendations, as well as other treatment options were discussed with the patient today. Questions were answered.    Follow-up: Return if symptoms worsen or fail to improve, for Next scheduled follow up.     * Surgery not found *      This document has been electronically signed by Dru Corrales PA-C on September 7, 2023 19:38 CDT      Results for orders placed or performed in visit on 09/06/23   CBC Auto Differential    Specimen: Blood   Result Value Ref Range    WBC 9.83 3.40 - 10.80 10*3/mm3    RBC 5.13 4.14 - 5.80 10*6/mm3    Hemoglobin 16.6 13.0 - 17.7 g/dL    Hematocrit 47.0 37.5 - 51.0 %    MCV 91.6 79.0 - 97.0 fL    MCH 32.4 26.6 - 33.0 pg    MCHC 35.3 31.5 - 35.7 g/dL    RDW 13.2 12.3 - 15.4 %    RDW-SD 44.5 37.0 - 54.0 fl    MPV 10.5 6.0 - 12.0 fL    Platelets 294 140 - 450 10*3/mm3    Neutrophil % 61.1 42.7 - 76.0 %    Lymphocyte % 25.1 19.6 - 45.3 %    Monocyte % 8.7 5.0 - 12.0 %    Eosinophil % 4.0 0.3 - 6.2 %    Basophil % 0.7 0.0 - 1.5 %    Immature Grans % 0.4 0.0 - 0.5 %    Neutrophils, Absolute 6.00 1.70 - 7.00 10*3/mm3    Lymphocytes, Absolute 2.47 0.70 - 3.10 10*3/mm3    Monocytes, Absolute 0.86 0.10 - 0.90 10*3/mm3    Eosinophils, Absolute 0.39 0.00 - 0.40 10*3/mm3    Basophils, Absolute 0.07 0.00 - 0.20 10*3/mm3    Immature Grans, Absolute 0.04 0.00 - 0.05 10*3/mm3    nRBC 0.0 0.0 - 0.2 /100 WBC   C-reactive protein    Specimen: Blood   Result Value Ref Range    C-Reactive Protein 2.31 (H) 0.00 - 0.50 mg/dL   Lipid Panel    Specimen: Blood   Result Value Ref Range    Total Cholesterol 150 0 - 200 mg/dL    Triglycerides 126 0 - 150 mg/dL    HDL Cholesterol 34 (L) 40 - 60 mg/dL    LDL Cholesterol  93 0 - 100 mg/dL    VLDL Cholesterol 23 5 - 40 mg/dL    LDL/HDL Ratio 2.67    Comprehensive Metabolic Panel    Specimen: Blood   Result Value Ref Range     Glucose 91 65 - 99 mg/dL    BUN 16 6 - 20 mg/dL    Creatinine 1.21 0.76 - 1.27 mg/dL    Sodium 144 136 - 145 mmol/L    Potassium 4.4 3.5 - 5.2 mmol/L    Chloride 104 98 - 107 mmol/L    CO2 24.0 22.0 - 29.0 mmol/L    Calcium 9.8 8.6 - 10.5 mg/dL    Total Protein 6.9 6.0 - 8.5 g/dL    Albumin 4.1 3.5 - 5.2 g/dL    ALT (SGPT) 25 1 - 41 U/L    AST (SGOT) 15 1 - 40 U/L    Alkaline Phosphatase 91 39 - 117 U/L    Total Bilirubin 0.7 0.0 - 1.2 mg/dL    Globulin 2.8 gm/dL    A/G Ratio 1.5 g/dL    BUN/Creatinine Ratio 13.2 7.0 - 25.0    Anion Gap 16.0 (H) 5.0 - 15.0 mmol/L    eGFR 75.7 >60.0 mL/min/1.73   Results for orders placed or performed in visit on 23   Basic Metabolic Panel    Specimen: Blood   Result Value Ref Range    Glucose 83 65 - 99 mg/dL    BUN 14 6 - 20 mg/dL    Creatinine 1.30 (H) 0.76 - 1.27 mg/dL    Sodium 143 136 - 145 mmol/L    Potassium 4.3 3.5 - 5.2 mmol/L    Chloride 106 98 - 107 mmol/L    CO2 28.0 22.0 - 29.0 mmol/L    Calcium 8.8 8.6 - 10.5 mg/dL    BUN/Creatinine Ratio 10.8 7.0 - 25.0    Anion Gap 9.0 5.0 - 15.0 mmol/L    eGFR 69.5 >60.0 mL/min/1.73   ECG 12 Lead   Result Value Ref Range    QT Interval 400 ms    QTC Interval 425 ms   Results for orders placed or performed during the hospital encounter of 23   TISSUE EXAM, [facility] ([facility])    Specimen: A: Small Intestine, Duodenum; Tissue    B: Gastric, Antrum; Tissue    C: Esophagus, Distal; Tissue   Result Value Ref Range    Reference Lab Report       [facility]  [address] [place] [address]  Phone: [phone] or [phone]  Fax: [phone]  Juan Alberto Cisneros M.D., Medical Director    PATIENT NAME                                     LABORATORY NO.  [id number]   [address].                            [id number]  [id number]                                 AGE                    SEX   SSN              CLIENT REF #  [facility]                   42        1980      " M     [id number]      [id number]    [place]                               REQUESTING M.D.           ATTENDING M.D.         COPY TO.  [address] [place] [address]                         [place] [address]                     DATE COLLECTED            DATE RECEIVED          DATE REPORTED  08/18/2023 08/18/2023 08/21/2023    DIAGNOSIS:  A.     SMALL BOWEL, BIOPSY:  Unremarkable duodenal type mucosa  Negative for villous blunting, increased  intraepithelial lymphocytes,  organisms and malignancy  B.     ANTRUM, BIOPSY:  Gastric antral type mucosa with reactive (chemical) gastropathy  Negative for intestinal metaplasia or dysplasia  No Helicobacter pylori-like organisms seen  C.     ESOPHAGUS, BIOPSY, DISTAL:  Squamous mucosa with features suggestive of reflux esophagitis  Negative for eosinophilic esophagitis (0-2 eosinophils/hpf)  Negative for glandular type mucosa, intestinal metaplasia, or dysplasia    CLINICAL HISTORY:  Epigastric pain, gastroesophageal reflux disease with esophagitis without  hemorrhage, nausea, abnormal finding on GI tract imaging    SPECIMENS RECEIVED:  A.    SMALL BOWEL, BIOPSY  B.    ANTRUM, BIOPSY  C.    ESOPHAGUS, BIOPSY , DISTAL    MICROSCOPIC DESCRIPTION:  Tissue blocks are prepared and slides are examined microscopically on all  specimens. See diagnosis for details.    Professional interpretation rendered by Lupillo Centeno M.D., F.C.A.P. at [email or web address], [facility], [address] [place] [address].    GROSS DESCRIPTION:  A.    Labeled \"small bowel biopsy\".  Consists of 2 pieces of tan soft tissue  measuring 0.5 x 0.2 x 0.2 cm in aggregate and is submitted entirely in 1  block.  SONIA  B.    Labeled \"antrum biopsy\".  Consists of 1 piece of tan soft tissue measuring  0.4 x 0.2 x 0.2 cm and is submitted entirely in 1 block.  C.    Labeled \"distal esophagus biopsy\".  Consists of 1 piece of tan soft tissue  measuring 0.4 x 0.2 x " 0.1 cm in aggregate and is submitted entirely in 1  block.    REVIEWED, DIAGNOSED AND ELECTRONICALLY  SIGNED BY:    Wisam Centeno M.D., F.C.A.P.  CPT CODES:  88305x3     Results for orders placed or performed in visit on 08/17/23   COREAS Fibrosure Plus    Specimen: Blood   Result Value Ref Range    Fibrosis Score 0.07 0.00 - 0.21    Fibrosis Stage Comment     Steatosis Score (Reference) 0.37 0.00 - 0.40    Steatosis Grade (Reference) Comment     COREAS Score (Reference) 0.00 0.00 - 0.25    Coreas Grade (Reference) Comment     Methodology: Comment     Alpha 2-Macroglobulins, Qn 136 110 - 276 mg/dL    Haptoglobin 165 23 - 355 mg/dL    Apolipoprotein A-1 122 101 - 178 mg/dL    Total Bilirubin 0.3 0.0 - 1.2 mg/dL    GGT 20 0 - 65 IU/L    ALT (SGPT) 29 0 - 55 IU/L    AST (SGOT) P5P (Reference) 23 0 - 40 IU/L    Cholesterol, Total (Reference) 181 100 - 199 mg/dL    Glucose, Serum (Reference) 79 70 - 99 mg/dL    Triglycerides 131 0 - 149 mg/dL    Interpretations: (Reference) Comment     Fibrosis Scoring: Comment     Steatosis Scoring Comment     COREAS Scoring Comment     Limitations: Comment     Comment Comment    Protime-INR    Specimen: Blood   Result Value Ref Range    Protime 12.7 11.1 - 15.3 Seconds    INR 0.95 0.80 - 1.20   Lipase    Specimen: Blood   Result Value Ref Range    Lipase 37 13 - 60 U/L   Amylase    Specimen: Blood   Result Value Ref Range    Amylase 77 28 - 100 U/L   Results for orders placed or performed in visit on 03/30/23   CBC Auto Differential    Specimen: Blood   Result Value Ref Range    WBC 9.27 3.40 - 10.80 10*3/mm3    RBC 4.99 4.14 - 5.80 10*6/mm3    Hemoglobin 15.9 13.0 - 17.7 g/dL    Hematocrit 45.0 37.5 - 51.0 %    MCV 90.2 79.0 - 97.0 fL    MCH 31.9 26.6 - 33.0 pg    MCHC 35.3 31.5 - 35.7 g/dL    RDW 12.8 12.3 - 15.4 %    RDW-SD 42.5 37.0 - 54.0 fl    MPV 10.2 6.0 - 12.0 fL    Platelets 308 140 - 450 10*3/mm3    Neutrophil % 60.6 42.7 - 76.0 %    Lymphocyte % 27.7 19.6 - 45.3 %    Monocyte %  7.3 5.0 - 12.0 %    Eosinophil % 3.1 0.3 - 6.2 %    Basophil % 0.9 0.0 - 1.5 %    Immature Grans % 0.4 0.0 - 0.5 %    Neutrophils, Absolute 5.61 1.70 - 7.00 10*3/mm3    Lymphocytes, Absolute 2.57 0.70 - 3.10 10*3/mm3    Monocytes, Absolute 0.68 0.10 - 0.90 10*3/mm3    Eosinophils, Absolute 0.29 0.00 - 0.40 10*3/mm3    Basophils, Absolute 0.08 0.00 - 0.20 10*3/mm3    Immature Grans, Absolute 0.04 0.00 - 0.05 10*3/mm3    nRBC 0.0 0.0 - 0.2 /100 WBC     *Note: Due to a large number of results and/or encounters for the requested time period, some results have not been displayed. A complete set of results can be found in Results Review.

## 2023-08-28 ENCOUNTER — PRE-ADMISSION TESTING (OUTPATIENT)
Dept: PREADMISSION TESTING | Facility: HOSPITAL | Age: 43
End: 2023-08-28
Payer: COMMERCIAL

## 2023-08-28 VITALS
SYSTOLIC BLOOD PRESSURE: 144 MMHG | BODY MASS INDEX: 34.4 KG/M2 | HEART RATE: 74 BPM | OXYGEN SATURATION: 96 % | DIASTOLIC BLOOD PRESSURE: 76 MMHG | WEIGHT: 227 LBS | HEIGHT: 68 IN | RESPIRATION RATE: 16 BRPM

## 2023-08-28 LAB
ANION GAP SERPL CALCULATED.3IONS-SCNC: 9 MMOL/L (ref 5–15)
BUN SERPL-MCNC: 14 MG/DL (ref 6–20)
BUN/CREAT SERPL: 10.8 (ref 7–25)
CALCIUM SPEC-SCNC: 8.8 MG/DL (ref 8.6–10.5)
CHLORIDE SERPL-SCNC: 106 MMOL/L (ref 98–107)
CO2 SERPL-SCNC: 28 MMOL/L (ref 22–29)
CREAT SERPL-MCNC: 1.3 MG/DL (ref 0.76–1.27)
EGFRCR SERPLBLD CKD-EPI 2021: 69.5 ML/MIN/1.73
GLUCOSE SERPL-MCNC: 83 MG/DL (ref 65–99)
POTASSIUM SERPL-SCNC: 4.3 MMOL/L (ref 3.5–5.2)
QT INTERVAL: 400 MS
QTC INTERVAL: 425 MS
SODIUM SERPL-SCNC: 143 MMOL/L (ref 136–145)

## 2023-08-28 PROCEDURE — 93005 ELECTROCARDIOGRAM TRACING: CPT

## 2023-08-28 PROCEDURE — 36415 COLL VENOUS BLD VENIPUNCTURE: CPT

## 2023-08-28 PROCEDURE — 80048 BASIC METABOLIC PNL TOTAL CA: CPT

## 2023-08-28 RX ORDER — RIZATRIPTAN BENZOATE 10 MG/1
10 TABLET, ORALLY DISINTEGRATING ORAL ONCE AS NEEDED
COMMUNITY
End: 2023-09-06 | Stop reason: SDUPTHER

## 2023-08-28 RX ORDER — SODIUM CHLORIDE 9 MG/ML
1000 INJECTION, SOLUTION INTRAVENOUS CONTINUOUS
Status: CANCELLED | OUTPATIENT
Start: 2023-09-01

## 2023-08-31 ENCOUNTER — ANESTHESIA EVENT (OUTPATIENT)
Dept: PERIOP | Facility: HOSPITAL | Age: 43
End: 2023-08-31
Payer: COMMERCIAL

## 2023-09-01 ENCOUNTER — ANESTHESIA (OUTPATIENT)
Dept: PERIOP | Facility: HOSPITAL | Age: 43
End: 2023-09-01
Payer: COMMERCIAL

## 2023-09-01 ENCOUNTER — HOSPITAL ENCOUNTER (OUTPATIENT)
Facility: HOSPITAL | Age: 43
Setting detail: HOSPITAL OUTPATIENT SURGERY
Discharge: HOME | End: 2023-09-01
Attending: STUDENT IN AN ORGANIZED HEALTH CARE EDUCATION/TRAINING PROGRAM | Admitting: STUDENT IN AN ORGANIZED HEALTH CARE EDUCATION/TRAINING PROGRAM
Payer: COMMERCIAL

## 2023-09-01 VITALS
RESPIRATION RATE: 20 BRPM | HEART RATE: 75 BPM | DIASTOLIC BLOOD PRESSURE: 85 MMHG | OXYGEN SATURATION: 94 % | SYSTOLIC BLOOD PRESSURE: 127 MMHG | TEMPERATURE: 97 F | BODY MASS INDEX: 33.61 KG/M2 | HEIGHT: 68 IN | WEIGHT: 221.78 LBS

## 2023-09-01 DIAGNOSIS — R10.11 RUQ PAIN: ICD-10-CM

## 2023-09-01 DIAGNOSIS — R10.13 EPIGASTRIC PAIN: Primary | ICD-10-CM

## 2023-09-01 DIAGNOSIS — K82.8 ADENOMYOSIS OF GALLBLADDER: ICD-10-CM

## 2023-09-01 PROCEDURE — 25010000002 HYDROMORPHONE 1 MG/ML SOLUTION: Performed by: ANESTHESIOLOGY

## 2023-09-01 PROCEDURE — 25010000002 FENTANYL CITRATE (PF) 100 MCG/2ML SOLUTION: Performed by: NURSE ANESTHETIST, CERTIFIED REGISTERED

## 2023-09-01 PROCEDURE — 25010000002 SUCCINYLCHOLINE PER 20 MG: Performed by: NURSE ANESTHETIST, CERTIFIED REGISTERED

## 2023-09-01 PROCEDURE — 25010000002 PROPOFOL 200 MG/20ML EMULSION: Performed by: NURSE ANESTHETIST, CERTIFIED REGISTERED

## 2023-09-01 PROCEDURE — S2900 ROBOTIC SURGICAL SYSTEM: HCPCS | Performed by: STUDENT IN AN ORGANIZED HEALTH CARE EDUCATION/TRAINING PROGRAM

## 2023-09-01 PROCEDURE — 25010000002 MIDAZOLAM PER 1 MG: Performed by: NURSE ANESTHETIST, CERTIFIED REGISTERED

## 2023-09-01 PROCEDURE — 94640 AIRWAY INHALATION TREATMENT: CPT

## 2023-09-01 PROCEDURE — 25010000002 ONDANSETRON PER 1 MG: Performed by: ANESTHESIOLOGY

## 2023-09-01 PROCEDURE — 25010000002 ONDANSETRON PER 1 MG: Performed by: NURSE ANESTHETIST, CERTIFIED REGISTERED

## 2023-09-01 PROCEDURE — 25010000002 INDOCYANINE GREEN 25 MG RECONSTITUTED SOLUTION: Performed by: STUDENT IN AN ORGANIZED HEALTH CARE EDUCATION/TRAINING PROGRAM

## 2023-09-01 PROCEDURE — 47562 LAPAROSCOPIC CHOLECYSTECTOMY: CPT | Performed by: STUDENT IN AN ORGANIZED HEALTH CARE EDUCATION/TRAINING PROGRAM

## 2023-09-01 PROCEDURE — 25010000002 CEFAZOLIN PER 500 MG: Performed by: STUDENT IN AN ORGANIZED HEALTH CARE EDUCATION/TRAINING PROGRAM

## 2023-09-01 DEVICE — CLIP LIG HEMOLOK PA LG 6CT PRP: Type: IMPLANTABLE DEVICE | Site: ABDOMEN | Status: FUNCTIONAL

## 2023-09-01 RX ORDER — IPRATROPIUM BROMIDE AND ALBUTEROL SULFATE 2.5; .5 MG/3ML; MG/3ML
3 SOLUTION RESPIRATORY (INHALATION) ONCE
Status: COMPLETED | OUTPATIENT
Start: 2023-09-01 | End: 2023-09-01

## 2023-09-01 RX ORDER — BUPIVACAINE HCL/0.9 % NACL/PF 0.1 %
2000 PLASTIC BAG, INJECTION (ML) EPIDURAL ONCE
Status: COMPLETED | OUTPATIENT
Start: 2023-09-01 | End: 2023-09-01

## 2023-09-01 RX ORDER — OXYCODONE AND ACETAMINOPHEN 10; 325 MG/1; MG/1
1 TABLET ORAL EVERY 6 HOURS PRN
Qty: 28 TABLET | Refills: 0 | Status: SHIPPED | OUTPATIENT
Start: 2023-09-01 | End: 2023-09-25

## 2023-09-01 RX ORDER — PROMETHAZINE HYDROCHLORIDE 25 MG/1
25 SUPPOSITORY RECTAL ONCE AS NEEDED
Status: DISCONTINUED | OUTPATIENT
Start: 2023-09-01 | End: 2023-09-01 | Stop reason: HOSPADM

## 2023-09-01 RX ORDER — INDOCYANINE GREEN AND WATER 25 MG
5 KIT INJECTION ONCE
Status: COMPLETED | OUTPATIENT
Start: 2023-09-01 | End: 2023-09-01

## 2023-09-01 RX ORDER — MEPERIDINE HYDROCHLORIDE 25 MG/ML
12.5 INJECTION INTRAMUSCULAR; INTRAVENOUS; SUBCUTANEOUS
Status: DISCONTINUED | OUTPATIENT
Start: 2023-09-01 | End: 2023-09-01 | Stop reason: HOSPADM

## 2023-09-01 RX ORDER — VECURONIUM BROMIDE 1 MG/ML
INJECTION, POWDER, LYOPHILIZED, FOR SOLUTION INTRAVENOUS AS NEEDED
Status: DISCONTINUED | OUTPATIENT
Start: 2023-09-01 | End: 2023-09-01 | Stop reason: SURG

## 2023-09-01 RX ORDER — BUPIVACAINE HYDROCHLORIDE AND EPINEPHRINE 5; 5 MG/ML; UG/ML
INJECTION, SOLUTION EPIDURAL; INTRACAUDAL; PERINEURAL AS NEEDED
Status: DISCONTINUED | OUTPATIENT
Start: 2023-09-01 | End: 2023-09-01 | Stop reason: HOSPADM

## 2023-09-01 RX ORDER — ACETAMINOPHEN 325 MG/1
650 TABLET ORAL ONCE AS NEEDED
Status: DISCONTINUED | OUTPATIENT
Start: 2023-09-01 | End: 2023-09-01 | Stop reason: HOSPADM

## 2023-09-01 RX ORDER — ONDANSETRON 2 MG/ML
4 INJECTION INTRAMUSCULAR; INTRAVENOUS ONCE AS NEEDED
Status: COMPLETED | OUTPATIENT
Start: 2023-09-01 | End: 2023-09-01

## 2023-09-01 RX ORDER — PROPOFOL 10 MG/ML
INJECTION, EMULSION INTRAVENOUS AS NEEDED
Status: DISCONTINUED | OUTPATIENT
Start: 2023-09-01 | End: 2023-09-01 | Stop reason: SURG

## 2023-09-01 RX ORDER — EPHEDRINE SULFATE 50 MG/ML
5 INJECTION, SOLUTION INTRAVENOUS ONCE AS NEEDED
Status: DISCONTINUED | OUTPATIENT
Start: 2023-09-01 | End: 2023-09-01 | Stop reason: HOSPADM

## 2023-09-01 RX ORDER — PHENYLEPHRINE HCL IN 0.9% NACL 0.5 MG/5ML
SYRINGE (ML) INTRAVENOUS AS NEEDED
Status: DISCONTINUED | OUTPATIENT
Start: 2023-09-01 | End: 2023-09-01 | Stop reason: SURG

## 2023-09-01 RX ORDER — PROMETHAZINE HYDROCHLORIDE 25 MG/1
25 TABLET ORAL ONCE AS NEEDED
Status: DISCONTINUED | OUTPATIENT
Start: 2023-09-01 | End: 2023-09-01 | Stop reason: HOSPADM

## 2023-09-01 RX ORDER — DIPHENHYDRAMINE HYDROCHLORIDE 50 MG/ML
12.5 INJECTION INTRAMUSCULAR; INTRAVENOUS
Status: DISCONTINUED | OUTPATIENT
Start: 2023-09-01 | End: 2023-09-01 | Stop reason: HOSPADM

## 2023-09-01 RX ORDER — NALOXONE HCL 0.4 MG/ML
0.4 VIAL (ML) INJECTION AS NEEDED
Status: DISCONTINUED | OUTPATIENT
Start: 2023-09-01 | End: 2023-09-01 | Stop reason: HOSPADM

## 2023-09-01 RX ORDER — MIDAZOLAM HYDROCHLORIDE 1 MG/ML
INJECTION INTRAMUSCULAR; INTRAVENOUS AS NEEDED
Status: DISCONTINUED | OUTPATIENT
Start: 2023-09-01 | End: 2023-09-01 | Stop reason: SURG

## 2023-09-01 RX ORDER — OXYCODONE AND ACETAMINOPHEN 10; 325 MG/1; MG/1
1 TABLET ORAL ONCE AS NEEDED
Status: COMPLETED | OUTPATIENT
Start: 2023-09-01 | End: 2023-09-01

## 2023-09-01 RX ORDER — ONDANSETRON 2 MG/ML
4 INJECTION INTRAMUSCULAR; INTRAVENOUS ONCE
Status: COMPLETED | OUTPATIENT
Start: 2023-09-01 | End: 2023-09-01

## 2023-09-01 RX ORDER — FENTANYL CITRATE 50 UG/ML
INJECTION, SOLUTION INTRAMUSCULAR; INTRAVENOUS AS NEEDED
Status: DISCONTINUED | OUTPATIENT
Start: 2023-09-01 | End: 2023-09-01 | Stop reason: SURG

## 2023-09-01 RX ORDER — SUCCINYLCHOLINE CHLORIDE 20 MG/ML
INJECTION INTRAMUSCULAR; INTRAVENOUS AS NEEDED
Status: DISCONTINUED | OUTPATIENT
Start: 2023-09-01 | End: 2023-09-01 | Stop reason: SURG

## 2023-09-01 RX ORDER — SODIUM CHLORIDE 9 MG/ML
1000 INJECTION, SOLUTION INTRAVENOUS CONTINUOUS
Status: DISCONTINUED | OUTPATIENT
Start: 2023-09-01 | End: 2023-09-01 | Stop reason: HOSPADM

## 2023-09-01 RX ORDER — FLUMAZENIL 0.1 MG/ML
0.2 INJECTION INTRAVENOUS AS NEEDED
Status: DISCONTINUED | OUTPATIENT
Start: 2023-09-01 | End: 2023-09-01 | Stop reason: HOSPADM

## 2023-09-01 RX ADMIN — SODIUM CHLORIDE 1000 ML: 9 INJECTION, SOLUTION INTRAVENOUS at 06:11

## 2023-09-01 RX ADMIN — FENTANYL CITRATE 50 MCG: 50 INJECTION, SOLUTION INTRAMUSCULAR; INTRAVENOUS at 08:19

## 2023-09-01 RX ADMIN — Medication 50 MCG: at 09:00

## 2023-09-01 RX ADMIN — FENTANYL CITRATE 50 MCG: 50 INJECTION, SOLUTION INTRAMUSCULAR; INTRAVENOUS at 08:45

## 2023-09-01 RX ADMIN — MIDAZOLAM HYDROCHLORIDE 2 MG: 1 INJECTION, SOLUTION INTRAMUSCULAR; INTRAVENOUS at 07:41

## 2023-09-01 RX ADMIN — PROPOFOL 20 MG: 10 INJECTION, EMULSION INTRAVENOUS at 07:53

## 2023-09-01 RX ADMIN — INDOCYANINE GREEN AND WATER 5 MG: KIT at 06:11

## 2023-09-01 RX ADMIN — VECURONIUM BROMIDE 2 MG: 1 INJECTION, POWDER, LYOPHILIZED, FOR SOLUTION INTRAVENOUS at 08:14

## 2023-09-01 RX ADMIN — OXYCODONE HYDROCHLORIDE AND ACETAMINOPHEN 1 TABLET: 10; 325 TABLET ORAL at 10:46

## 2023-09-01 RX ADMIN — HYDROMORPHONE HYDROCHLORIDE 0.5 MG: 1 INJECTION, SOLUTION INTRAMUSCULAR; INTRAVENOUS; SUBCUTANEOUS at 10:05

## 2023-09-01 RX ADMIN — Medication 2000 MG: at 07:53

## 2023-09-01 RX ADMIN — IPRATROPIUM BROMIDE AND ALBUTEROL SULFATE 3 ML: 2.5; .5 SOLUTION RESPIRATORY (INHALATION) at 09:54

## 2023-09-01 RX ADMIN — VECURONIUM BROMIDE 2 MG: 1 INJECTION, POWDER, LYOPHILIZED, FOR SOLUTION INTRAVENOUS at 07:51

## 2023-09-01 RX ADMIN — FENTANYL CITRATE 50 MCG: 50 INJECTION, SOLUTION INTRAMUSCULAR; INTRAVENOUS at 08:14

## 2023-09-01 RX ADMIN — VECURONIUM BROMIDE 4 MG: 1 INJECTION, POWDER, LYOPHILIZED, FOR SOLUTION INTRAVENOUS at 07:58

## 2023-09-01 RX ADMIN — HYDROMORPHONE HYDROCHLORIDE 0.5 MG: 1 INJECTION, SOLUTION INTRAMUSCULAR; INTRAVENOUS; SUBCUTANEOUS at 10:16

## 2023-09-01 RX ADMIN — VECURONIUM BROMIDE 2 MG: 1 INJECTION, POWDER, LYOPHILIZED, FOR SOLUTION INTRAVENOUS at 09:03

## 2023-09-01 RX ADMIN — ONDANSETRON 4 MG: 2 INJECTION INTRAMUSCULAR; INTRAVENOUS at 10:07

## 2023-09-01 RX ADMIN — ONDANSETRON 4 MG: 2 INJECTION INTRAMUSCULAR; INTRAVENOUS at 08:34

## 2023-09-01 RX ADMIN — PROPOFOL 180 MG: 10 INJECTION, EMULSION INTRAVENOUS at 07:51

## 2023-09-01 RX ADMIN — SUCCINYLCHOLINE CHLORIDE 60 MG: 20 INJECTION, SOLUTION INTRAMUSCULAR; INTRAVENOUS at 07:52

## 2023-09-01 NOTE — ANESTHESIA POSTPROCEDURE EVALUATION
"Patient: Michel Harmon    Procedure Summary       Date: 09/01/23 Room / Location: [facility] OR 03 / [facility] OR    Anesthesia Start: 0743 Anesthesia Stop: 0952    Procedure: CHOLECYSTECTOMY LAPAROSCOPIC WITH DAVINCI ROBOT (Abdomen) Diagnosis:       Adenomyosis of gallbladder      (Adenomyosis of gallbladder [K82.8])    Surgeons: Justino Quintana MD Provider: Bisi Schaffer CRNA    Anesthesia Type: general ASA Status: 3            Anesthesia Type: general    Vitals  No vitals data found for the desired time range.          Post Anesthesia Care and Evaluation    Patient location during evaluation: PACU  Patient participation: complete - patient participated  Level of consciousness: sleepy but conscious  Pain score: 0  Pain management: adequate    Airway patency: patent  Anesthetic complications: No anesthetic complications  PONV Status: none  Cardiovascular status: acceptable  Respiratory status: face mask  Hydration status: acceptable    Comments: /77   Pulse 64   Temp 97.9 øF (36.1 øC)   Resp 18   Ht 172.7 cm (68\")   Wt 101 kg (221 lb 12.5 oz)   SpO2 96%   BMI 33.72 kg/mý       "

## 2023-09-01 NOTE — BRIEF OP NOTE
CHOLECYSTECTOMY LAPAROSCOPIC WITH DAVINCI ROBOT  Progress Note    Michel Harmon  9/1/2023    Pre-op Diagnosis:   Adenomyosis of gallbladder [K82.8]       Post-Op Diagnosis Codes:     * Adenomyosis of gallbladder [K82.8]    Procedure/CPT® Codes:        Procedure(s):  CHOLECYSTECTOMY LAPAROSCOPIC WITH DAVINCI ROBOT              Surgeon(s):  Justino Quintana MD    Anesthesia: General    Staff:   Circulator: Sandie Cassidy RN; Kelsie Bonilla RN  Scrub Person: Suman Villafana  Assistant: Roxana Yost  Assistant: Roxana Yost      Estimated Blood Loss: minimal    Urine Voided: * No values recorded between 9/1/2023  7:42 AM and 9/1/2023  9:24 AM *    Specimens:                Specimens       ID Source Type Tests Collected By Collected At Frozen?    A Gallbladder Tissue TISSUE EXAM, [facility] ([facility])   Justino Quintana MD 9/1/23 0920 No    Description: GALLBLADDER AND CONTENTS    This specimen was not marked as sent.                  Drains: * No LDAs found *    Findings: dense intraabdominal adhesions        Complications: none    Assistant: Roxana Yost  was responsible for performing the following activities: Retraction, Suction, Irrigation, Suturing, Closing, Placing Dressing, and Held/Positioned Camera and their skilled assistance was necessary for the success of this case.    Justino Quintana MD     Date: 9/1/2023  Time: 09:24 CDT

## 2023-09-01 NOTE — ANESTHESIA PROCEDURE NOTES
Airway  Urgency: elective    Date/Time: 9/1/2023 7:53 AM    General Information and Staff    Patient location during procedure: OR  CRNA/CAA: Bisi Schaffer CRNA  SRNA: Ami Velázquez SRNA  Indications and Patient Condition  Indications for airway management: airway protection    Preoxygenated: yes  MILS maintained throughout      Final Airway Details  Final airway type: endotracheal airway      Successful airway: ETT  Cuffed: yes   Successful intubation technique: video laryngoscopy  Facilitating devices/methods: intubating stylet  Endotracheal tube insertion site: oral  Blade: Gonzalez  Blade size: 3  ETT size (mm): 7.5  Cormack-Lehane Classification: grade I - full view of glottis  Measured from: lips  Number of attempts at approach: 2  Assessment: lips, teeth, and gum same as pre-op and atraumatic intubation

## 2023-09-01 NOTE — ANESTHESIA PREPROCEDURE EVALUATION
Anesthesia Evaluation     Patient summary reviewed and Nursing notes reviewed   no history of anesthetic complications:     NPO Liquid Status: > 8 hours           Airway   Mallampati: II  TM distance: >3 FB  Neck ROM: full  Dental    (+) poor dentition    Pulmonary     breath sounds clear to auscultation  (+) a smoker Current Abstained day of surgery,sleep apnea  (-) asthma, shortness of breath, rhonchi, decreased breath sounds, wheezes, rales  Cardiovascular   Exercise tolerance: good (4-7 METS)    ECG reviewed  Rhythm: regular  Rate: normal    (+) hypertension less than 2 medications  (-) past MI, dysrhythmias, angina, DIAMOND, murmur, cardiac stents, CABG, DVT    ROS comment: EK23  Normal sinus rhythm  Normal ECG  No previous ECGs available      Neuro/Psych  (+) CVA (2016) residual symptoms, headaches (Migraines, occ), psychiatric history Anxiety  (-) seizures  GI/Hepatic/Renal/Endo    (+) obesity, hiatal hernia, GERD well controlled, renal disease stones and CRI  (-) liver disease, diabetes, no thyroid disorder    Musculoskeletal     Abdominal   (+) obese   Substance History   (-) alcohol use, drug use     OB/GYN          Other   arthritis,     ROS/Med Hx Other: Hx: Sleep Apnea:CPAP noncompliant     CVA: 2016, weak on the right side     Hx:GERD: controlled on Pepcid and Nexium     HIDA scan: 23  FINDINGS:  Good uptake of radiotracer by the liver is seen. Gallbladder is nonvisualized..  Small bowel activity is seen by 15 minutes minutes postinjection.     IMPRESSION:  Nonvisualization of the gallbladder which may be related to cystic duct  obstruction                    Anesthesia Plan    ASA 3     general     intravenous induction     Anesthetic plan, risks, benefits, and alternatives have been provided, discussed and informed consent has been obtained with: patient.      CODE STATUS:

## 2023-09-01 NOTE — INTERVAL H&P NOTE
H&P reviewed. The patient was examined and there are no changes to the H&P.      Vitals:    09/01/23 0606   BP: 125/77   Pulse: 64   Resp: 18   Temp: 96.9 °F (36.1 °C)   SpO2: 96%      I have counseled the patient about the nature of the problem, the natural history of the disease, the risks and benefits of surgery, the expected recovery, and the alternatives to surgery.  After this discussion, they were given an opportunity to ask questions, have an understanding of diagnosis and treatment options, and wish to proceed with surgery.

## 2023-09-05 NOTE — OP NOTE
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]    Procedure/CPT® Codes:      Procedure(s):  CHOLECYSTECTOMY LAPAROSCOPIC WITH DAVINCI ROBOT  Extensive laparoscopic lysis of adhesions    Surgeon(s):  Justino Quintana MD    Anesthesia: General    Staff:   Circulator: Sandie Cassidy RN; Kelsie Bonilla RN  Scrub Person: Suman Villafana  Assistant: Roxana Yost    Estimated Blood Loss: minimal    Specimens:                ID Type Source Tests Collected by Time   A : GALLBLADDER AND CONTENTS Tissue Gallbladder TISSUE EXAM, [facility] ([facility]) Justino Quintana MD 9/1/2023 0920         Drains: * No LDAs found *    Findings: dense intraabdominal adhesions    Complications: none    Indication: 41 yo gentleman with completle nonvisulization on his HIDA and classic biliary colic symptoms who presents for robotic assisted cholecystectomy.     Operative Note:    The patient was brought to the operating room and placed supine on the operating table. After adequate general endotracheal anesthesia, the abdominal area was prepped and draped in a sterile manner. A briefing and timeout were performed and all parties were in agreement.     A left subcostal transverse incision was made in the midclavicular line approximately 2cm caudally from the costal margin. A 5mm optical trocar was used to enter the abdomen. The abdomen was insufflated to a total of 15 mmHg, 4.8 L of CO2. A 5 mm laparoscope revealed dense intraabdominal adhesions to the midline. In order to take these down to see the gallbladder a second 5mm trocar was placed in the left lower quadrant. I then spent approximately 1 hour lysing the adhesions. Once this was done I was able to continue with the cholecystectomy.     Three additional robotic trocars were then placed under direct visualization triangulating  the gallbladder in the right upper quadrant. The 5mm trocar was then upsized to an 8mm. The bed was then tilted in reverse Trendelenburg and tipped to the left. The patient tolerated the positioning and insufflation without difficulty. The robot was then docked.    The gallbladder was retracted upwards and outwards by grasping the top and the infundibulum of the gallbladder with a ProGrasp passed through the 4th port. The peritoneum around the infundibulum was taken down for a distance of 1/3 of the length of the gallbladder on the anterior and posterior sides. The cystic duct and artery easily came into view as did the 5th segment of the liver behind these structures.     The cystic duct was doubly clipped and divided. The cystic artery was identified and cauterized in multiple areas and then ligated distally. The gallbladder was then dissected out of the liver bed using electrocautery. Once it had been nearly completely excised, pressure in the abdomen was reduced to 8 mmHg with no further bleeding being noted from the liver bed. The remainder of the gallbladder was dissected free.  It was placed into an Endobag and brought out intact through the epigastric port.     All areas were visualized for bleeding and bile leak. None was seen. The patient was then placed supine.  All trocars were removed and the abdomen was allowed to flatten. All port sites were closed with 4-0 subcuticular on the skin.    The procedure was terminated. It was well tolerated. Sponge and needle counts were correct, and the patient was transferred to recovery in satisfactory condition.             Assistant: Roxana Yost was responsible for performing the following activities: Retraction, Suction, Irrigation, Suturing, Closing, Placing Dressing, and Held/Positioned Camera and their skilled assistance was necessary for the success of this case.    Justino Quintana MD     Date: 9/5/2023  Time: 07:32 CDT

## 2023-09-06 ENCOUNTER — OFFICE VISIT (OUTPATIENT)
Dept: FAMILY MEDICINE CLINIC | Facility: CLINIC | Age: 43
End: 2023-09-06
Payer: COMMERCIAL

## 2023-09-06 ENCOUNTER — LAB (OUTPATIENT)
Dept: LAB | Facility: HOSPITAL | Age: 43
End: 2023-09-06
Payer: COMMERCIAL

## 2023-09-06 VITALS
TEMPERATURE: 98.2 F | OXYGEN SATURATION: 98 % | HEIGHT: 68 IN | DIASTOLIC BLOOD PRESSURE: 82 MMHG | SYSTOLIC BLOOD PRESSURE: 128 MMHG | BODY MASS INDEX: 32.86 KG/M2 | WEIGHT: 216.8 LBS | HEART RATE: 84 BPM

## 2023-09-06 DIAGNOSIS — Z78.9 NEED FOR NICOTINE REPLACEMENT THERAPY: ICD-10-CM

## 2023-09-06 DIAGNOSIS — R10.11 RUQ PAIN: ICD-10-CM

## 2023-09-06 DIAGNOSIS — R10.13 EPIGASTRIC PAIN: ICD-10-CM

## 2023-09-06 DIAGNOSIS — N18.31 STAGE 3A CHRONIC KIDNEY DISEASE: ICD-10-CM

## 2023-09-06 DIAGNOSIS — Z90.49 S/P CHOLECYSTECTOMY: ICD-10-CM

## 2023-09-06 DIAGNOSIS — K21.00 GASTROESOPHAGEAL REFLUX DISEASE WITH ESOPHAGITIS WITHOUT HEMORRHAGE: ICD-10-CM

## 2023-09-06 DIAGNOSIS — Z72.0 TOBACCO USER: ICD-10-CM

## 2023-09-06 DIAGNOSIS — F17.209 NICOTINE DEPENDENCE WITH NICOTINE-INDUCED DISORDER, UNSPECIFIED NICOTINE PRODUCT TYPE: ICD-10-CM

## 2023-09-06 DIAGNOSIS — K50.019 CROHN'S DISEASE OF SMALL INTESTINE WITH COMPLICATION: ICD-10-CM

## 2023-09-06 DIAGNOSIS — I10 ESSENTIAL HYPERTENSION: ICD-10-CM

## 2023-09-06 DIAGNOSIS — K29.70 GASTRITIS WITHOUT BLEEDING, UNSPECIFIED CHRONICITY, UNSPECIFIED GASTRITIS TYPE: ICD-10-CM

## 2023-09-06 DIAGNOSIS — N18.2 STAGE 2 CHRONIC KIDNEY DISEASE: ICD-10-CM

## 2023-09-06 DIAGNOSIS — G43.809 OTHER MIGRAINE WITHOUT STATUS MIGRAINOSUS, NOT INTRACTABLE: ICD-10-CM

## 2023-09-06 DIAGNOSIS — K44.9 HIATAL HERNIA: ICD-10-CM

## 2023-09-06 LAB
ALBUMIN SERPL-MCNC: 4.1 G/DL (ref 3.5–5.2)
ALBUMIN/GLOB SERPL: 1.5 G/DL
ALP SERPL-CCNC: 91 U/L (ref 39–117)
ALT SERPL W P-5'-P-CCNC: 25 U/L (ref 1–41)
ANION GAP SERPL CALCULATED.3IONS-SCNC: 16 MMOL/L (ref 5–15)
AST SERPL-CCNC: 15 U/L (ref 1–40)
BASOPHILS # BLD AUTO: 0.07 10*3/MM3 (ref 0–0.2)
BASOPHILS NFR BLD AUTO: 0.7 % (ref 0–1.5)
BILIRUB SERPL-MCNC: 0.7 MG/DL (ref 0–1.2)
BUN SERPL-MCNC: 16 MG/DL (ref 6–20)
BUN/CREAT SERPL: 13.2 (ref 7–25)
CALCIUM SPEC-SCNC: 9.8 MG/DL (ref 8.6–10.5)
CHLORIDE SERPL-SCNC: 104 MMOL/L (ref 98–107)
CHOLEST SERPL-MCNC: 150 MG/DL (ref 0–200)
CO2 SERPL-SCNC: 24 MMOL/L (ref 22–29)
CREAT SERPL-MCNC: 1.21 MG/DL (ref 0.76–1.27)
CRP SERPL-MCNC: 2.31 MG/DL (ref 0–0.5)
DEPRECATED RDW RBC AUTO: 44.5 FL (ref 37–54)
EGFRCR SERPLBLD CKD-EPI 2021: 75.7 ML/MIN/1.73
EOSINOPHIL # BLD AUTO: 0.39 10*3/MM3 (ref 0–0.4)
EOSINOPHIL NFR BLD AUTO: 4 % (ref 0.3–6.2)
ERYTHROCYTE [DISTWIDTH] IN BLOOD BY AUTOMATED COUNT: 13.2 % (ref 12.3–15.4)
GLOBULIN UR ELPH-MCNC: 2.8 GM/DL
GLUCOSE SERPL-MCNC: 91 MG/DL (ref 65–99)
HCT VFR BLD AUTO: 47 % (ref 37.5–51)
HDLC SERPL-MCNC: 34 MG/DL (ref 40–60)
HGB BLD-MCNC: 16.6 G/DL (ref 13–17.7)
IMM GRANULOCYTES # BLD AUTO: 0.04 10*3/MM3 (ref 0–0.05)
IMM GRANULOCYTES NFR BLD AUTO: 0.4 % (ref 0–0.5)
LDLC SERPL CALC-MCNC: 93 MG/DL (ref 0–100)
LDLC/HDLC SERPL: 2.67 {RATIO}
LYMPHOCYTES # BLD AUTO: 2.47 10*3/MM3 (ref 0.7–3.1)
LYMPHOCYTES NFR BLD AUTO: 25.1 % (ref 19.6–45.3)
MCH RBC QN AUTO: 32.4 PG (ref 26.6–33)
MCHC RBC AUTO-ENTMCNC: 35.3 G/DL (ref 31.5–35.7)
MCV RBC AUTO: 91.6 FL (ref 79–97)
MONOCYTES # BLD AUTO: 0.86 10*3/MM3 (ref 0.1–0.9)
MONOCYTES NFR BLD AUTO: 8.7 % (ref 5–12)
NEUTROPHILS NFR BLD AUTO: 6 10*3/MM3 (ref 1.7–7)
NEUTROPHILS NFR BLD AUTO: 61.1 % (ref 42.7–76)
NRBC BLD AUTO-RTO: 0 /100 WBC (ref 0–0.2)
PLATELET # BLD AUTO: 294 10*3/MM3 (ref 140–450)
PMV BLD AUTO: 10.5 FL (ref 6–12)
POTASSIUM SERPL-SCNC: 4.4 MMOL/L (ref 3.5–5.2)
PROT SERPL-MCNC: 6.9 G/DL (ref 6–8.5)
RBC # BLD AUTO: 5.13 10*6/MM3 (ref 4.14–5.8)
SODIUM SERPL-SCNC: 144 MMOL/L (ref 136–145)
TRIGL SERPL-MCNC: 126 MG/DL (ref 0–150)
VLDLC SERPL-MCNC: 23 MG/DL (ref 5–40)
WBC NRBC COR # BLD: 9.83 10*3/MM3 (ref 3.4–10.8)

## 2023-09-06 PROCEDURE — 86140 C-REACTIVE PROTEIN: CPT

## 2023-09-06 PROCEDURE — 80061 LIPID PANEL: CPT

## 2023-09-06 PROCEDURE — 85025 COMPLETE CBC W/AUTO DIFF WBC: CPT

## 2023-09-06 PROCEDURE — 99214 OFFICE O/P EST MOD 30 MIN: CPT | Performed by: FAMILY MEDICINE

## 2023-09-06 PROCEDURE — 80053 COMPREHEN METABOLIC PANEL: CPT

## 2023-09-06 RX ORDER — ONDANSETRON 8 MG/1
8 TABLET, ORALLY DISINTEGRATING ORAL EVERY 8 HOURS PRN
Qty: 90 TABLET | Refills: 3 | Status: SHIPPED | OUTPATIENT
Start: 2023-09-06

## 2023-09-06 RX ORDER — AMLODIPINE BESYLATE 5 MG/1
5 TABLET ORAL DAILY
Qty: 30 TABLET | Refills: 3 | Status: SHIPPED | OUTPATIENT
Start: 2023-09-06

## 2023-09-06 RX ORDER — GLYCOPYRROLATE 2 MG/1
2 TABLET ORAL 2 TIMES DAILY
Qty: 60 TABLET | Refills: 2 | Status: SHIPPED | OUTPATIENT
Start: 2023-09-06

## 2023-09-06 RX ORDER — RIZATRIPTAN BENZOATE 10 MG/1
10 TABLET, ORALLY DISINTEGRATING ORAL ONCE AS NEEDED
Qty: 10 TABLET | Refills: 3 | Status: SHIPPED | OUTPATIENT
Start: 2023-09-06

## 2023-09-06 RX ORDER — ESOMEPRAZOLE MAGNESIUM 40 MG/1
40 CAPSULE, DELAYED RELEASE ORAL
Qty: 30 CAPSULE | Refills: 3 | Status: SHIPPED | OUTPATIENT
Start: 2023-09-06

## 2023-09-06 RX ORDER — ONDANSETRON 8 MG/1
8 TABLET, ORALLY DISINTEGRATING ORAL EVERY 8 HOURS PRN
Qty: 90 TABLET | Refills: 3 | Status: SHIPPED | OUTPATIENT
Start: 2023-09-06 | End: 2023-09-06 | Stop reason: SDUPTHER

## 2023-09-06 NOTE — PATIENT INSTRUCTIONS
Next 24 hours if getting worse go to ER or call 911 . Recommend [facility]    Get labwork today    X-ray downstairs today    Recheck in 6 weeks     Zofran    Stick to foods like bananas, rice, toast applesauce for nausea/ avoid greasy foods or high fatty foods    Recheck in 6 weeks

## 2023-09-08 LAB — REF LAB TEST METHOD: NORMAL

## 2023-09-12 ENCOUNTER — TELEPHONE (OUTPATIENT)
Dept: FAMILY MEDICINE CLINIC | Facility: CLINIC | Age: 43
End: 2023-09-12
Payer: COMMERCIAL

## 2023-09-13 LAB
QT INTERVAL: 400 MS
QTC INTERVAL: 425 MS

## 2023-09-14 ENCOUNTER — OFFICE VISIT (OUTPATIENT)
Dept: SURGERY | Facility: CLINIC | Age: 43
End: 2023-09-14
Payer: COMMERCIAL

## 2023-09-14 VITALS
HEART RATE: 93 BPM | HEIGHT: 68 IN | TEMPERATURE: 97.1 F | BODY MASS INDEX: 31.98 KG/M2 | SYSTOLIC BLOOD PRESSURE: 110 MMHG | DIASTOLIC BLOOD PRESSURE: 68 MMHG | WEIGHT: 211 LBS | OXYGEN SATURATION: 95 %

## 2023-09-14 DIAGNOSIS — R10.9 ACUTE POSTOPERATIVE ABDOMINAL PAIN: Primary | ICD-10-CM

## 2023-09-14 DIAGNOSIS — G89.18 ACUTE POSTOPERATIVE ABDOMINAL PAIN: Primary | ICD-10-CM

## 2023-09-14 PROCEDURE — 99024 POSTOP FOLLOW-UP VISIT: CPT | Performed by: STUDENT IN AN ORGANIZED HEALTH CARE EDUCATION/TRAINING PROGRAM

## 2023-09-14 NOTE — TELEPHONE ENCOUNTER
Patient dropped of disability forms to be filled out. They are needing to be completed by Dr. Quintana. Papers have been given to Dr. Ramirez office.

## 2023-09-14 NOTE — PROGRESS NOTES
CC: Abdominal pain two weeks after robotic cholecystectomy     HPI:43 yo gentleman with history of Crohns disease who underwent robotic cholecystectomy two weeks ago. He states he was slowly improving until two days ago where he developed nasuea and vomiting and was unable to keep any food down. He also has been having diarrhea. He had an XR done which did not show much. He is afebrile today    Past Medical History:   Diagnosis Date    Anxiety 2016    Cholelithiasis 2013    Colon polyp 2018    Crohn's disease     Erectile dysfunction 2014    GERD (gastroesophageal reflux disease)     Headache 2016    Hypertension     Irritable bowel syndrome 2016    Kidney stone 2018    Migraine     Pancreatitis 2017    Renal insufficiency 2022    3C    Sleep apnea     Stroke 2015    During ielloscectomy    Visual impairment 1985    Glasses       Past Surgical History:   Procedure Laterality Date    CHOLECYSTECTOMY      CHOLECYSTECTOMY N/A 9/1/2023    Procedure: CHOLECYSTECTOMY LAPAROSCOPIC WITH DAVINCI ROBOT;  Surgeon: Justino Quintana MD;  Location: [facility] OR;  Service: Robotics - DaVinci;  Laterality: N/A;    COLON SURGERY  2015    ileoscectomy    COLONOSCOPY      COLONOSCOPY N/A 10/07/2020    Procedure: COLONOSCOPY--look TI;  Surgeon: Wisam Carpio MD;  Location: [facility] ENDOSCOPY;  Service: Gastroenterology;  Laterality: N/A;    ENDOSCOPY N/A 10/07/2020    Procedure: ESOPHAGOGASTRODUODENOSCOPY--bx;  Surgeon: Wisam Carpio MD;  Location: [facility] ENDOSCOPY;  Service: Gastroenterology;  Laterality: N/A;    ENDOSCOPY N/A 08/18/2023    Procedure: ESOPHAGOGASTRODUODENOSCOPY;  Surgeon: Wisam Carpio MD;  Location: [facility] ENDOSCOPY;  Service: Gastroenterology;  Laterality: N/A;    HERNIA REPAIR  1981    UPPER GASTROINTESTINAL ENDOSCOPY  10/07/2020         Current Outpatient Medications:     amLODIPine (NORVASC) 5 MG tablet, Take 1 tablet by mouth Daily., Disp: 30 tablet, Rfl: 3    Aspirin-Salicylamide-Caffeine (BC HEADACHE  POWDER PO), Take  by mouth 2 (Two) Times a Day As Needed., Disp: , Rfl:     cyclobenzaprine (FLEXERIL) 5 MG tablet, Take 1 tablet by mouth 3 (Three) Times a Day As Needed., Disp: , Rfl:     esomeprazole (nexIUM) 40 MG capsule, Take 1 capsule by mouth Every Morning Before Breakfast., Disp: 30 capsule, Rfl: 3    famotidine (Pepcid) 40 MG tablet, Take 1 tablet by mouth 2 (Two) Times a Day As Needed for Heartburn., Disp: 60 tablet, Rfl: 2    gabapentin (NEURONTIN) 400 MG capsule, Take 1 capsule by mouth 3 (Three) Times a Day., Disp: , Rfl:     Melatonin 10 MG tablet, Take 1 tablet by mouth Every Night., Disp: , Rfl:     mesalamine (Apriso) 0.375 g 24 hr capsule, Take 4 capsules by mouth Daily., Disp: 120 capsule, Rfl: 1    ondansetron ODT (Zofran ODT) 8 MG disintegrating tablet, Place 1 tablet on the tongue Every 8 (Eight) Hours As Needed for Nausea or Vomiting., Disp: 90 tablet, Rfl: 3    rizatriptan MLT (MAXALT-MLT) 10 MG disintegrating tablet, Place 1 tablet on the tongue 1 (One) Time As Needed for Migraine. May repeat in 2 hours if needed, Disp: 10 tablet, Rfl: 3    glycopyrrolate (ROBINUL) 2 MG tablet, Take 1 tablet by mouth 2 (Two) Times a Day. (Patient not taking: Reported on 9/14/2023), Disp: 60 tablet, Rfl: 2    nicotine (Nicoderm CQ) 21 MG/24HR patch, Place 1 patch on the skin as directed by provider Daily. (Patient not taking: Reported on 9/14/2023), Disp: 30 patch, Rfl: 11    Allergies   Allergen Reactions    Hydrocodone Itching       Family History   Problem Relation Age of Onset    Breast cancer Other     Brain cancer Other     Ulcers Other     Bleeding Disorder Other     Anxiety disorder Sister     Mental illness Sister         Involuntary commitment mid 90s    Cancer Maternal Aunt         Breast cancer then brain cancer       Social History     Socioeconomic History    Marital status:    Tobacco Use    Smoking status: Every Day     Packs/day: 0.50     Years: 26.00     Pack years: 13.00     Types:  Cigarettes     Start date: 1/1/1996    Smokeless tobacco: Never   Vaping Use    Vaping Use: Never used   Substance and Sexual Activity    Alcohol use: Not Currently    Drug use: Never    Sexual activity: Defer     Partners: Female     Birth control/protection: None       Review of Systems   Gastrointestinal:  Positive for abdominal distention, abdominal pain, diarrhea, nausea and vomiting.     Physical Exam  Constitutional:       Appearance: Normal appearance.   HENT:      Head: Normocephalic and atraumatic.      Nose: Nose normal. No congestion.      Mouth/Throat:      Mouth: Mucous membranes are moist.      Pharynx: Oropharynx is clear.   Eyes:      General: No scleral icterus.     Pupils: Pupils are equal, round, and reactive to light.   Cardiovascular:      Rate and Rhythm: Normal rate and regular rhythm.   Pulmonary:      Effort: Pulmonary effort is normal. No respiratory distress.   Abdominal:      General: There is distension.      Tenderness: There is abdominal tenderness.   Skin:     General: Skin is warm and dry.   Neurological:      General: No focal deficit present.      Mental Status: He is alert and oriented to person, place, and time.   Psychiatric:         Mood and Affect: Mood normal.         Behavior: Behavior normal.         ASSESSMENT    Diagnoses and all orders for this visit:    1. Acute postoperative abdominal pain (Primary)  -     CT Abdomen Pelvis With Contrast; Future        PLAN    Recommended CT scan to rule out any postoperative complications. I also said he should call or go to the ED if he is unable to stay hydrated or his pain is uncontrollable. He is reluctant to go back to the hospital at the moment and wants to deal with this at home.

## 2023-09-25 DIAGNOSIS — G89.18 ACUTE POSTOPERATIVE ABDOMINAL PAIN: ICD-10-CM

## 2023-09-25 DIAGNOSIS — R10.9 ACUTE POSTOPERATIVE ABDOMINAL PAIN: ICD-10-CM

## 2023-09-28 ENCOUNTER — OFFICE VISIT (OUTPATIENT)
Dept: SURGERY | Facility: CLINIC | Age: 43
End: 2023-09-28
Payer: COMMERCIAL

## 2023-09-28 VITALS
DIASTOLIC BLOOD PRESSURE: 88 MMHG | HEART RATE: 81 BPM | TEMPERATURE: 96.6 F | SYSTOLIC BLOOD PRESSURE: 128 MMHG | OXYGEN SATURATION: 95 % | HEIGHT: 68 IN | WEIGHT: 211 LBS | BODY MASS INDEX: 31.98 KG/M2

## 2023-09-28 DIAGNOSIS — K82.8 ADENOMYOSIS OF GALLBLADDER: ICD-10-CM

## 2023-09-28 DIAGNOSIS — R10.13 EPIGASTRIC PAIN: ICD-10-CM

## 2023-09-28 DIAGNOSIS — R10.9 ACUTE POSTOPERATIVE ABDOMINAL PAIN: Primary | ICD-10-CM

## 2023-09-28 DIAGNOSIS — G89.18 ACUTE POSTOPERATIVE ABDOMINAL PAIN: Primary | ICD-10-CM

## 2023-09-28 PROCEDURE — 99024 POSTOP FOLLOW-UP VISIT: CPT | Performed by: STUDENT IN AN ORGANIZED HEALTH CARE EDUCATION/TRAINING PROGRAM

## 2023-10-17 NOTE — PROGRESS NOTES
CHIEF COMPLAINT:   Chief Complaint   Patient presents with    Post-op Follow-up     Lap zurdo         HPI: This patient presents for a post-operative visit after undergoing a robotic  cholecystectomy.  Patient reports he is still having pain. We reviewed Ct scan together.        PHYSICAL EXAM:    ABD: Incisions are healing well without any erythema or signs of infection. Remains tender around incisions    ASSESSMENT:    Diagnoses and all orders for this visit:    1. Acute postoperative abdominal pain (Primary)    2. Adenomyosis of gallbladder    3. Epigastric pain        PLAN:    Discussed with patient that scan and labs show no sign of complication at the surgical site. I believe his pain will improve. I am concerns this may have something to do with his crohns disease and so have asked to get him seen earlier with gastroenterology. Ill see him back in a couple weeks to see how he improves.         This document has been electronically signed by Justino Quintana MD on October 17, 2023 07:52 CDT

## (undated) DEVICE — APPL CHLORAPREP HI/LITE 26ML ORNG

## (undated) DEVICE — SUT MONOCRYL 4/0 PS2 27IN Y426H ETY426H

## (undated) DEVICE — GLV SURG SENSICARE PI PF LF 7 GRN STRL

## (undated) DEVICE — TISSUE RETRIEVAL SYSTEM: Brand: INZII RETRIEVAL SYSTEM

## (undated) DEVICE — STRIP,CLOSURE,WOUND,MEDI-STRIP,1/2X4: Brand: MEDLINE

## (undated) DEVICE — STERILE POLYISOPRENE POWDER-FREE SURGICAL GLOVES: Brand: PROTEXIS

## (undated) DEVICE — BITEBLOCK ENDO W/STRAP 60F A/ LF DISP

## (undated) DEVICE — PATIENT RETURN ELECTRODE, SINGLE-USE, CONTACT QUALITY MONITORING, ADULT, WITH 9FT CORD, FOR PATIENTS WEIGING OVER 33LBS. (15KG): Brand: MEGADYNE

## (undated) DEVICE — STERILE POLYISOPRENE POWDER-FREE SURGICAL GLOVES WITH EMOLLIENT COATING: Brand: PROTEXIS

## (undated) DEVICE — BLADELESS OBTURATOR: Brand: WECK VISTA

## (undated) DEVICE — CANNULA SEAL

## (undated) DEVICE — DBD-DRAPE,LAP,CHOLE,W/TROUGHS,STERILE: Brand: MEDLINE

## (undated) DEVICE — SOL IRR H2O BTL 1000ML STRL

## (undated) DEVICE — ADHS LIQ MASTISOL 2/3ML

## (undated) DEVICE — COLUMN DRAPE

## (undated) DEVICE — PK LAP CHOLE LF 60

## (undated) DEVICE — GLV SURG SIGNATURE ESSENTIAL PF LTX SZ6.5

## (undated) DEVICE — ENDOPATH XCEL BLADELESS TROCARS WITH STABILITY SLEEVES: Brand: ENDOPATH XCEL

## (undated) DEVICE — LAPAROVUE VISIBILITY SYSTEM LAPAROSCOPIC SOLUTIONS: Brand: LAPAROVUE

## (undated) DEVICE — ARM DRAPE

## (undated) DEVICE — SINGLE-USE BIOPSY FORCEPS: Brand: RADIAL JAW 4

## (undated) DEVICE — MONOPOLAR METZENBAUM SCISSOR TIP, DISPOSABLE: Brand: MONOPOLAR METZENBAUM SCISSOR TIP, DISPOSABLE

## (undated) DEVICE — TRENDELENBURG WINGPAD POSITIONING KIT DELUXE - WITHOUT BODY STRAP: Brand: SOULE MEDICAL